# Patient Record
Sex: FEMALE
[De-identification: names, ages, dates, MRNs, and addresses within clinical notes are randomized per-mention and may not be internally consistent; named-entity substitution may affect disease eponyms.]

---

## 2022-02-22 PROBLEM — Z00.00 ENCOUNTER FOR PREVENTIVE HEALTH EXAMINATION: Status: ACTIVE | Noted: 2022-02-22

## 2022-02-23 ENCOUNTER — APPOINTMENT (OUTPATIENT)
Dept: GERIATRICS | Facility: ASSISTED LIVING FACILITY | Age: 87
End: 2022-02-23
Payer: MEDICARE

## 2022-02-23 VITALS
DIASTOLIC BLOOD PRESSURE: 85 MMHG | OXYGEN SATURATION: 98 % | RESPIRATION RATE: 12 BRPM | SYSTOLIC BLOOD PRESSURE: 154 MMHG | TEMPERATURE: 97.8 F | HEART RATE: 68 BPM

## 2022-02-23 VITALS — WEIGHT: 119.8 LBS

## 2022-02-23 DIAGNOSIS — R91.1 SOLITARY PULMONARY NODULE: ICD-10-CM
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
## 2022-02-23 NOTE — ASSESSMENT
[FreeTextEntry1] : Plan of care d/w patient's daughter, Sridevi. We need to clarify who will be family spokesperson given three adult children and spouse who is reportedly hospitalized.\par \par We spent extended time during this visit with review of >50 pages of medical records, obtaining information from family and care team, and counseling. \par \par f/u visit in 4 weeks.

## 2022-02-23 NOTE — PHYSICAL EXAM
[Alert] : alert [Well Nourished] : well nourished [No Acute Distress] : in no acute distress [Sclera] : the sclera and conjunctiva were normal [EOMI] : extraocular movements were intact [PERRL] : pupils were equal in size, round, and reactive to light [Normal Outer Ear/Nose] : the ears and nose were normal in appearance [Normal Appearance] : the appearance of the neck was normal [Supple] : the neck was supple [No Respiratory Distress] : no respiratory distress [No Acc Muscle Use] : no accessory muscle use [Respiration, Rhythm And Depth] : normal respiratory rhythm and effort [Auscultation Breath Sounds / Voice Sounds] : lungs were clear to auscultation bilaterally [Heart Rate And Rhythm] : heart rate was normal and rhythm regular [Bowel Sounds] : normal bowel sounds [Abdomen Tenderness] : non-tender [Abdomen Soft] : soft [No Spinal Tenderness] : no spinal tenderness [Normal Color / Pigmentation] : normal skin color and pigmentation [Normal Turgor] : normal skin turgor [No Focal Deficits] : no focal deficits [Normal Affect] : the affect was normal [Normal Mood] : the mood was normal

## 2022-02-23 NOTE — DATA REVIEWED
[FreeTextEntry1] : Labs from Hospitalization on 2/9/22 reviewed:\par \par CBC 5.6 > 14/42 < 288 (overall unremarkable)\par /3.4; 106/26; 11/0.94 < 117/9.1\par LFTs 7.6/3.7; 19/20; /69\par \par CT Head (2/2022): Chronic microvascular disease, no acute changes or masses

## 2022-03-01 ENCOUNTER — TRANSCRIPTION ENCOUNTER (OUTPATIENT)
Age: 87
End: 2022-03-01

## 2022-03-23 ENCOUNTER — APPOINTMENT (OUTPATIENT)
Dept: GERIATRICS | Facility: ASSISTED LIVING FACILITY | Age: 87
End: 2022-03-23
Payer: MEDICARE

## 2022-03-23 VITALS
DIASTOLIC BLOOD PRESSURE: 84 MMHG | OXYGEN SATURATION: 95 % | WEIGHT: 124 LBS | RESPIRATION RATE: 12 BRPM | HEART RATE: 90 BPM | SYSTOLIC BLOOD PRESSURE: 150 MMHG

## 2022-03-23 DIAGNOSIS — R03.0 ELEVATED BLOOD-PRESSURE READING, W/OUT DIAGNOSIS OF HYPERTENSION: ICD-10-CM

## 2022-03-23 DIAGNOSIS — Z82.0 FAMILY HISTORY OF EPILEPSY AND OTHER DISEASES OF THE NERVOUS SYSTEM: ICD-10-CM

## 2022-03-23 DIAGNOSIS — Z82.49 FAMILY HISTORY OF ISCHEMIC HEART DISEASE AND OTHER DISEASES OF THE CIRCULATORY SYSTEM: ICD-10-CM

## 2022-03-25 PROBLEM — Z82.0 FAMILY HISTORY OF PARKINSONISM: Status: ACTIVE | Noted: 2022-03-25

## 2022-03-25 PROBLEM — Z82.49 FAMILY HISTORY OF CEREBRAL ANEURYSM: Status: ACTIVE | Noted: 2022-02-23

## 2022-03-25 PROBLEM — R03.0 ELEVATED BLOOD PRESSURE READING: Status: ACTIVE | Noted: 2022-02-23

## 2022-03-25 NOTE — HISTORY OF PRESENT ILLNESS
[FreeTextEntry1] : 86 year old woman w/ PMH moderate Alzheimer's dementia w/ behavioral disturbance, anxiety, GERD, HLD, osteopenia presents for f/u visit.\par \par Patient is accompanied by daughter, Sridevi, who provides collateral hx. Also hx from wellness RN, Romy. \par \par Clarification to initial note: patient had only one hospitalization [facility].\par \par Patient has been doing well the past few weeks. Reportedly sleeping w/o disturbance. She has moments of hypersexuality, but is easily redirectable. There have been minimal moments of agitation. She is calmer than when she first moved into prison. \par \par Daughter reports that patient states she has occasional pain in her R heel and ankle when ambulating. Currently no reported pain. It is intermittent and resolves w/o any intervention. No reported pain at rest, not reproducible. No gait abnormality.\par \par #Alzheimers' disease: Daughter reports AD was first dx in 2019. She has had steady decline, now with total dependence on IADLs. Remains independent in ADLs. She has been on aricept for >1 year. She is now on seroquel 50 mg qhs. Has been sleeping well w/ no overnight agitation. No reported adverse SE. \par \par #HLD: On simvastatin 20 mg qhs. No FLP for review\par \par #GERD: On PPI. Reports no issues at this time\par \par #Osteopenia: On problem list from hospital records, but we have no DEXA available for review.\par \par #Lung Nodule: In records from CT chest ~2013, 4mm of LLL. Unclear if this was ever followed up or further investigated. Reportedly never a smoker. \par \par #Liver Cyst: In records from CT ~2013, noted 2.2x1.7 cm hypodense lesion in the L liver lobe, ?cyst vs. hemangioma\par \par SH: \par - , lived in [facility] prior to moving to [place] [facility]\par - Three children\par - Retired \par - Uses alcohol, unclear amount or severity or if h/o alcohol use disorder [TextBox_25] : unclear, reportedly h/o osteopenia [TextBox_31] : no screening on file, daughter states she is "hard of hearing" [Mammogramdate] : 08/14 [TextBox_19] : no need for further screening [AdvancecareDate] : 03/22 [FreeTextEntry4] : Daughters: Millie ([phone]), Sridevi ([phone])\par Spouse: Gerson Norman ([phone])\par \par Patient does not have designated HCP and currently does not have capacity to appoint one due to her underlying cognitive impairment. She is , but it is unclear if spouse is currently well enough to participate in decision making. Reportedly he has advanced illness.\par By surrogacy law, this would mean each of her 3 daughters share responsibility in decision making. \par However, Sridevi states today there may be HCP available for review.

## 2022-04-20 ENCOUNTER — APPOINTMENT (OUTPATIENT)
Dept: GERIATRICS | Facility: ASSISTED LIVING FACILITY | Age: 87
End: 2022-04-20
Payer: MEDICARE

## 2022-04-20 VITALS
OXYGEN SATURATION: 97 % | RESPIRATION RATE: 14 BRPM | WEIGHT: 129 LBS | DIASTOLIC BLOOD PRESSURE: 80 MMHG | HEART RATE: 78 BPM | SYSTOLIC BLOOD PRESSURE: 140 MMHG

## 2022-04-20 DIAGNOSIS — J30.9 ALLERGIC RHINITIS, UNSPECIFIED: ICD-10-CM

## 2022-04-20 RX ORDER — LORATADINE 10 MG/1
10 TABLET ORAL
Qty: 90 | Refills: 0 | Status: DISCONTINUED | COMMUNITY
Start: 2022-02-24 | End: 2022-04-20

## 2022-04-20 NOTE — DATA REVIEWED
[FreeTextEntry1] : Labs from Hospitalization on 2/9/22 reviewed:\par \par CBC 5.6 > 14/42 < 288 (overall unremarkable)\par /3.4; 106/26; 11/0.94 < 117/9.1\par LFTs 7.6/3.7; 19/20; /69\par B12 271\par RPR neg\par \par CT Head (2/2022): Chronic microvascular disease, no acute changes or masses

## 2022-04-20 NOTE — REASON FOR VISIT
[Follow-Up] : a follow-up visit [Formal Caregiver] : formal caregiver [Family Member] : family member [FreeTextEntry3] : daughter Sridevi and RUBEN Reyes

## 2022-04-20 NOTE — HISTORY OF PRESENT ILLNESS
[Patient reported osteoporosis screening was abnormal] : Patient reported osteoporosis screening was abnormal [Patient reported hearing was abnormal] : Patient reported hearing was abnormal [Patient reported colon/rectal/cancer screening was normal] : Patient reported colon/rectal cancer screening was normal [No falls in past year] : Patient reported no falls in the past year [Completely Independent] : Completely independent. [Completely Dependent] : Completely dependent. [With Patient/Caregiver] : , with patient/caregiver [Reviewed updated] : Reviewed, updated [FreeTextEntry1] : 86 year old woman w/ PMH moderate Alzheimer's dementia w/ behavioral disturbance, anxiety, GERD, HLD, osteopenia presents for f/u visit.\par \par Patient is accompanied by daughter, Sridevi, who provides collateral hx. Also hx from wellness RN, Romy. \par \par Prior Neurologist: Dr. Chloe Lara ([phone])\par \par Daughter, Sridevi, reports that patient's "memory is better" but she has had some episodes of "agitation" and "anxiety." Patient was delirious during hospitalization 2/2022 and continued upon move-in. She has at least moderate neurodegenerative disease at baseline. In the past two weeks, she has had a few episodes where she became angry with staff, but quickly was redirectable. No violent episodes.\par \par Her daughters continue to visit her daily, but have spent less time at half[place], allowing her to adapt to new environment. Sleeping has generally remained ok.\par \par R heel pain that was reported during visit one month ago reportedly resolved. Patient has not reported any issues in the interim. \par \par #Alzheimers' disease: Daughter reports AD was first dx in 2019. She has had steady decline, now with total dependence on IADLs. Remains independent in ADLs. She has been on aricept for >1 year. She is on seroquel 50 mg qhs, which has helped with sleep and evening agitation. No reported adverse SE. She does have behavioral issues that seem to be more frontal lobe related. \par \par #HLD: On simvastatin 20 mg qhs. No FLP for review. Did have evidence of microvascular disease on CT head, also on ASA 81 mg qd.\par \par #GERD: On PPI. Reports no issues at this time.\par \par #Osteopenia: On problem list from hospital records, but we have no DEXA available for review.\par \par #Lung Nodule: In records from CT chest ~2013, 4mm of LLL. Unclear if this was ever followed up or further investigated. Reportedly never a smoker. \par \par #Liver Cyst: In records from CT ~2013, noted 2.2x1.7 cm hypodense lesion in the L liver lobe, ?cyst vs. hemangioma\par \par SH: \par - , lived in [facility] prior to moving to [place] half[place]way\par - Three children (daughters)\par - Retired  [TextBox_25] : unclear, reportedly h/o osteopenia [TextBox_31] : no screening on file, daughter states she is "hard of hearing" [Mammogramdate] : 08/14 [TextBox_19] : no need for further screening [AdvancecareDate] : 03/22 [FreeTextEntry4] : Daughters: Millie ([phone]), Sridevi ([phone]), and Cady\par Spouse: Gerson Norman ([phone]) - has deferred decision making\par \par Patient's three daughters make decisions concurrently. Sridevi is preferred family point of contact.\par \par Patient does not have designated HCP and currently does not have capacity to appoint one due to her underlying cognitive impairment. She is , but it is unclear if spouse is currently well enough to participate in decision making. Reportedly he has advanced illness.\par By surrogacy law, this would mean each of her 3 daughters share responsibility in decision making. \par However, Sridevi states today there may be HCP available for review.

## 2022-04-20 NOTE — PHYSICAL EXAM
[Alert] : alert [Well Nourished] : well nourished [Sclera] : the sclera and conjunctiva were normal [EOMI] : extraocular movements were intact [PERRL] : pupils were equal in size, round, and reactive to light [Normal Outer Ear/Nose] : the ears and nose were normal in appearance [Normal Appearance] : the appearance of the neck was normal [Supple] : the neck was supple [No Respiratory Distress] : no respiratory distress [No Acc Muscle Use] : no accessory muscle use [Respiration, Rhythm And Depth] : normal respiratory rhythm and effort [Auscultation Breath Sounds / Voice Sounds] : lungs were clear to auscultation bilaterally [Heart Rate And Rhythm] : heart rate was normal and rhythm regular [Bowel Sounds] : normal bowel sounds [Abdomen Tenderness] : non-tender [Abdomen Soft] : soft [No Spinal Tenderness] : no spinal tenderness [Normal Gait] : normal gait [No Joint Swelling] : no joint swelling seen [Normal Color / Pigmentation] : normal skin color and pigmentation [Normal Turgor] : normal skin turgor [No Focal Deficits] : no focal deficits [Normal S1, S2] : normal S1 and S2 [Edema] : edema was not present [Normal Insight/Judgment] : insight and judgment were not intact [de-identified] : flight of ideas, scattered affect. initially became agitated when we entered her room to examine her; she quickly apologized and stated "I'm joking, please please come in." inappropriate questions, consistently inquired about staff members marital status

## 2022-04-27 ENCOUNTER — APPOINTMENT (OUTPATIENT)
Dept: GERIATRICS | Facility: ASSISTED LIVING FACILITY | Age: 87
End: 2022-04-27
Payer: MEDICARE

## 2022-04-29 VITALS
RESPIRATION RATE: 12 BRPM | SYSTOLIC BLOOD PRESSURE: 140 MMHG | HEART RATE: 75 BPM | DIASTOLIC BLOOD PRESSURE: 68 MMHG | OXYGEN SATURATION: 98 %
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
## 2022-04-29 NOTE — PHYSICAL EXAM
[Alert] : alert [Well Nourished] : well nourished [Sclera] : the sclera and conjunctiva were normal [EOMI] : extraocular movements were intact [PERRL] : pupils were equal in size, round, and reactive to light [Normal Outer Ear/Nose] : the ears and nose were normal in appearance [Normal Appearance] : the appearance of the neck was normal [Supple] : the neck was supple [No Respiratory Distress] : no respiratory distress [No Acc Muscle Use] : no accessory muscle use [Respiration, Rhythm And Depth] : normal respiratory rhythm and effort [Auscultation Breath Sounds / Voice Sounds] : lungs were clear to auscultation bilaterally [Normal S1, S2] : normal S1 and S2 [Heart Rate And Rhythm] : heart rate was normal and rhythm regular [Edema] : edema was not present [Bowel Sounds] : normal bowel sounds [Abdomen Tenderness] : non-tender [Abdomen Soft] : soft [No Spinal Tenderness] : no spinal tenderness [Normal Gait] : normal gait [No Joint Swelling] : no joint swelling seen [Normal Color / Pigmentation] : normal skin color and pigmentation [Normal Turgor] : normal skin turgor [No Focal Deficits] : no focal deficits [Normal Insight/Judgment] : insight and judgment were not intact [de-identified] : There is no evidence of ankle trauma. no reproducible pain. full ROM at b/l joints [de-identified] : flight of ideas, scattered affect. initially became agitated when we entered her room to examine her; she quickly apologized and stated "I'm joking, please please come in." inappropriate questions, consistently inquired about staff members marital status

## 2022-04-29 NOTE — REASON FOR VISIT
[Acute] : an acute visit [Formal Caregiver] : formal caregiver [Family Member] : family member [FreeTextEntry3] : daughter, Sridevi

## 2022-05-03 LAB
ALBUMIN SERPL ELPH-MCNC: 4.3 G/DL
ALP BLD-CCNC: 63 U/L
ALT SERPL-CCNC: 14 U/L
ANION GAP SERPL CALC-SCNC: 13 MMOL/L
AST SERPL-CCNC: 24 U/L
BASOPHILS # BLD AUTO: 0.05 K/UL
BASOPHILS NFR BLD AUTO: 0.6 %
BILIRUB SERPL-MCNC: 0.2 MG/DL
BUN SERPL-MCNC: 14 MG/DL
CALCIUM SERPL-MCNC: 9.3 MG/DL
CHLORIDE SERPL-SCNC: 105 MMOL/L
CHOLEST SERPL-MCNC: 219 MG/DL
CO2 SERPL-SCNC: 25 MMOL/L
CREAT SERPL-MCNC: 0.97 MG/DL
EGFR: 57 ML/MIN/1.73M2
EOSINOPHIL # BLD AUTO: 0.13 K/UL
EOSINOPHIL NFR BLD AUTO: 1.7 %
GLUCOSE SERPL-MCNC: 96 MG/DL
HCT VFR BLD CALC: 44.3 %
HDLC SERPL-MCNC: 77 MG/DL
HGB BLD-MCNC: 14.1 G/DL
IMM GRANULOCYTES NFR BLD AUTO: 0.3 %
LDLC SERPL CALC-MCNC: 107 MG/DL
LYMPHOCYTES # BLD AUTO: 1.87 K/UL
LYMPHOCYTES NFR BLD AUTO: 24.2 %
MAN DIFF?: NORMAL
MCHC RBC-ENTMCNC: 31.1 PG
MCHC RBC-ENTMCNC: 31.8 GM/DL
MCV RBC AUTO: 97.6 FL
MONOCYTES # BLD AUTO: 0.55 K/UL
MONOCYTES NFR BLD AUTO: 7.1 %
NEUTROPHILS # BLD AUTO: 5.12 K/UL
NEUTROPHILS NFR BLD AUTO: 66.1 %
NONHDLC SERPL-MCNC: 142 MG/DL
PLATELET # BLD AUTO: 267 K/UL
POTASSIUM SERPL-SCNC: 3.9 MMOL/L
PROT SERPL-MCNC: 6.8 G/DL
RBC # BLD: 4.54 M/UL
RBC # FLD: 12.6 %
SODIUM SERPL-SCNC: 143 MMOL/L
TRIGL SERPL-MCNC: 177 MG/DL
TSH SERPL-ACNC: 0.46 UIU/ML
VIT B12 SERPL-MCNC: 547 PG/ML
WBC # FLD AUTO: 7.74 K/UL

## 2022-05-18 ENCOUNTER — APPOINTMENT (OUTPATIENT)
Dept: GERIATRICS | Facility: ASSISTED LIVING FACILITY | Age: 87
End: 2022-05-18
Payer: MEDICARE

## 2022-05-18 VITALS
RESPIRATION RATE: 18 BRPM | DIASTOLIC BLOOD PRESSURE: 80 MMHG | HEART RATE: 65 BPM | SYSTOLIC BLOOD PRESSURE: 147 MMHG | OXYGEN SATURATION: 97 %

## 2022-05-18 RX ORDER — SIMVASTATIN 20 MG/1
20 TABLET, FILM COATED ORAL
Qty: 90 | Refills: 1 | Status: DISCONTINUED | COMMUNITY
Start: 2022-02-24 | End: 2022-05-18

## 2022-05-18 NOTE — REASON FOR VISIT
[Follow-Up] : a follow-up visit [Formal Caregiver] : formal caregiver [Family Member] : family member [FreeTextEntry3] : Daughter, Sridevi & Wellness RN, Romy

## 2022-05-18 NOTE — END OF VISIT
[Time Spent: ___ minutes] : I have spent [unfilled] minutes of time on the encounter. [FreeTextEntry3] : Present for duration of visit w/ NP Braydon. Agree with assessment w/ minor revision.\par \par Patient w/ likely OA accounting for intermittent b/l ankle pain. There is no clinical evidence of inflammatory or infectious arthritis. Discussed feasibility of ortho eval w/ daughter, Sridevi, which she would like to pursue. Referral placed.\par \par Patient reportedly has had intermittent HA a/w sinus pressure and postnasal drip. No HA reported during our visit today. She has no focal deficits, no change in vision. She had CT head completed 2/2022 that showed microvascular dx w/o any space occupying lesions. We will treat w/ fluticasone for suspected chronic allergic rhinitis. I have discussed feasibility of neuro eval +/- further imaging w MRI brain which we are in agreement to defer for now.\par \par Her BP has been consistently >140/80 w/ most readings >150 SBP. Reasonable to initiate antihypertensive medication trial w/ goal SBP < 140. Will start norvasc 5 mg qd. Change statin to lipitor to avoid med-med interaction w/ simvastatin. \par \par f/u visit in 4 weeks.

## 2022-05-18 NOTE — HISTORY OF PRESENT ILLNESS
[Patient reported osteoporosis screening was abnormal] : Patient reported osteoporosis screening was abnormal [Patient reported hearing was abnormal] : Patient reported hearing was abnormal [Patient reported colon/rectal/cancer screening was normal] : Patient reported colon/rectal cancer screening was normal [No falls in past year] : Patient reported no falls in the past year [Completely Independent] : Completely independent. [Completely Dependent] : Completely dependent. [With Patient/Caregiver] : , with patient/caregiver [Reviewed updated] : Reviewed, updated [FreeTextEntry1] : JORGE BAUTISTA is an 86 year old woman w/ PMH moderate Alzheimer's dementia w/ behavioral disturbance, anxiety, GERD, HLD, and osteopenia who presents today for follow up visit.  Patient is accompanied by daughterSridevi who provides collateral history. Wellness, RUBEN Stanton also present and provided additional history.\par \par Prior Neurologist: Dr. Chloe Lara ([phone])\par \par Anxiety:\par -DaughterSridevi reports that anxiety has improved but is still present especially when she is not visiting at [place].\par -Lexapro 5 mg started 1 month ago \par -daughter still visiting [facility] frequently but less often than before, allowing patient to continue to adapt to new location\par \par Cough:\par -daughterSridevi, states that patient has had cough a/w postnasal drip intermittently\par -cough is not productive\par -states that she has seasonal allergies\par -cough is a/w occasional frontal HA in distribution of frontal sinuses (no alarm sx)\par \par Alzheimers' disease:\par - Daughter reports AD was first dx in 2019. She has had steady decline, now with total dependence on IADLs. \par -Remains independent in ADLs.\par -She has been on aricept for >1 year\par -She is on seroquel 50 mg qhs, which has helped with sleep and evening agitation. No reported adverse SE. \par - She does have behavioral issues that seem to be more frontal lobe related. \par -no longer having violent episodes\par \par Right heel pain:\par -patient no longer complaining of this however, daughterSridevi would like orthopedic referral  for possible further evalutaion\par \par Vision loss:\par -Sridevi states that patient occasionally c/o difficulty seeing; currently wears glasses\par -would like ophthalmology referral \par \par Insomnia:\par -reportedly improved\par -patient taking melatonin 3 mg \par \par Skin lesion on forehead:\par -patient saw dermatologist recently who removed skin lesion \par \par _____________________________________________________________________________________________________________\par #HLD: On simvastatin 20 mg qhs.  Did have evidence of microvascular disease on CT head, also on ASA 81 mg qd.\par \par #GERD: On PPI. Reports no issues at this time.\par \par #Osteopenia: On problem list from hospital records, but we have no DEXA available for review.\par \par #Lung Nodule: In records from CT chest ~2013, 4mm of LLL. Unclear if this was ever followed up or further investigated. Reportedly never a smoker. \par \par #Liver Cyst: In records from CT ~2013, noted 2.2x1.7 cm hypodense lesion in the L liver lobe, ?cyst vs. hemangioma\par \par SH: \par - , lived in [facility] prior to moving to [place] [facility]\par - Three children (daughters)\par - Retired  [TextBox_25] : unclear, reportedly h/o osteopenia [TextBox_31] : no screening on file, daughter states she is "hard of hearing" [Mammogramdate] : 08/14 [TextBox_19] : no need for further screening [AdvancecareDate] : 05/22 [FreeTextEntry4] : Daughters: Millie ([phone]), Sridevi ([phone]), and Cady\par Spouse: Gerson Norman ([phone]) - has deferred decision making\par Patient's three daughters make decisions concurrently. Sridevi is preferred family point of contact.\par \par Patient does not have designated HCP and currently does not have capacity to appoint one due to her underlying cognitive impairment. She is , but it is unclear if spouse is currently well enough to participate in decision making. Reportedly he has advanced illness.\par By surrogacy law, this would mean each of her 3 daughters share responsibility in decision making. \par However, Sridevi states today there may be HCP available for review.

## 2022-05-18 NOTE — ASSESSMENT
[FreeTextEntry1] : next visit in 1 month, earlier if needed\par \par Direct patient time 9:30am-10:10am \par \par ANA Griffiths-BC

## 2022-05-18 NOTE — PHYSICAL EXAM
[Alert] : alert [Well Nourished] : well nourished [Sclera] : the sclera and conjunctiva were normal [EOMI] : extraocular movements were intact [PERRL] : pupils were equal in size, round, and reactive to light [Normal Outer Ear/Nose] : the ears and nose were normal in appearance [Normal Appearance] : the appearance of the neck was normal [Supple] : the neck was supple [No Respiratory Distress] : no respiratory distress [No Acc Muscle Use] : no accessory muscle use [Respiration, Rhythm And Depth] : normal respiratory rhythm and effort [Auscultation Breath Sounds / Voice Sounds] : lungs were clear to auscultation bilaterally [Normal S1, S2] : normal S1 and S2 [Heart Rate And Rhythm] : heart rate was normal and rhythm regular [Edema] : edema was not present [Bowel Sounds] : normal bowel sounds [Abdomen Tenderness] : non-tender [Abdomen Soft] : soft [No Spinal Tenderness] : no spinal tenderness [Normal Gait] : normal gait [No Joint Swelling] : no joint swelling seen [Normal Color / Pigmentation] : normal skin color and pigmentation [Normal Turgor] : normal skin turgor [No Focal Deficits] : no focal deficits [Pedal Pulses Normal] : the pedal pulses are present [Normal Insight/Judgment] : insight and judgment were not intact [de-identified] : healing skin lesion on forehead after removal at dermatologist [de-identified] : flight of ideas, scattered affect, quickly becomes agitated but is able to be redirected, non violent, often seems angry but follt follows by stating "I'm just teasing"

## 2022-06-13 ENCOUNTER — APPOINTMENT (OUTPATIENT)
Dept: GERIATRICS | Facility: CLINIC | Age: 87
End: 2022-06-13

## 2022-06-15 ENCOUNTER — APPOINTMENT (OUTPATIENT)
Dept: GERIATRICS | Facility: ASSISTED LIVING FACILITY | Age: 87
End: 2022-06-15
Payer: MEDICARE

## 2022-06-15 VITALS
WEIGHT: 130 LBS | HEART RATE: 74 BPM | SYSTOLIC BLOOD PRESSURE: 127 MMHG | RESPIRATION RATE: 12 BRPM | DIASTOLIC BLOOD PRESSURE: 60 MMHG | OXYGEN SATURATION: 97 %

## 2022-06-15 DIAGNOSIS — F41.9 ANXIETY DISORDER, UNSPECIFIED: ICD-10-CM

## 2022-06-15 DIAGNOSIS — G47.00 INSOMNIA, UNSPECIFIED: ICD-10-CM

## 2022-06-15 RX ORDER — OMEPRAZOLE 20 MG/1
20 CAPSULE, DELAYED RELEASE ORAL
Qty: 90 | Refills: 0 | Status: DISCONTINUED | COMMUNITY
Start: 2022-02-24 | End: 2022-06-15

## 2022-06-15 NOTE — SOCIAL HISTORY
Comprehensive Intake Entered On:  7/17/2019 1:33 PM CDT    Performed On:  7/17/2019 1:29 PM CDT by Scarlett Isaac               Summary   Chief Complaint :   Nursing home rounds   Weight Measured :   166.0 lb(Converted to: 166 lb 0 oz, 75.30 kg)    Systolic Blood Pressure :   119 mmHg   Diastolic Blood Pressure :   80 mmHg   Mean Arterial Pressure :   93 mmHg   Peripheral Pulse Rate :   106 bpm (HI)    Vital Signs Comments :   weight per kinni   BP Method :   Electronic   HR Method :   Electronic   Temperature Tympanic :   97.6 DegF(Converted to: 36.4 DegC)  (LOW)    Scarlett Isaac - 7/17/2019 1:29 PM CDT   Health Status   Allergies Verified? :   Yes   Medication History Verified? :   Yes   Pre-Visit Planning Status :   Completed   Tobacco Use? :   Never smoker   Scarlett Isaac - 7/17/2019 1:29 PM CDT  
[Assisted living] : in assisted living

## 2022-06-16 NOTE — END OF VISIT
[Time Spent: ___ minutes] : I have spent [unfilled] minutes of time on the encounter. [FreeTextEntry3] : I was present for duration of history, physical, and patient counseling for patient's visit. I agree with assessment as documented above with the following additions/revisions: \par \par - Spoke at length with daughter, Sridevi, regarding management and behaviors\par - I suspect some of increased agitation last week was due to missed medication doses x 4 nights\par - She has taken seroquel for the past 2 days and Sridevi notes she was "closer to herself" this AM\par - She remains suspicious with paranoid delusions but was redirectable during my visit with her today\par - She does not have capacity to refuse medications. I have counseled with wellness staff here and advised that patient should have medications crushed and given in food or drink if needed. Daughter/HCP Sridevi is in agreement\par - Patient's BP is better controlled since starting norvasc\par - If behaviors worsen where she is a danger to staff or other residents, she would need ER evaluation. Controlled today\par - We will d/c omeprazole. Daughter is unaware when/why PPI was started in the past\par \par Short term f/u visit in 1 week

## 2022-06-16 NOTE — PHYSICAL EXAM
[Alert] : alert [Well Nourished] : well nourished [Sclera] : the sclera and conjunctiva were normal [EOMI] : extraocular movements were intact [PERRL] : pupils were equal in size, round, and reactive to light [Normal Outer Ear/Nose] : the ears and nose were normal in appearance [Normal Appearance] : the appearance of the neck was normal [Supple] : the neck was supple [No Respiratory Distress] : no respiratory distress [No Acc Muscle Use] : no accessory muscle use [Respiration, Rhythm And Depth] : normal respiratory rhythm and effort [Auscultation Breath Sounds / Voice Sounds] : lungs were clear to auscultation bilaterally [Normal S1, S2] : normal S1 and S2 [Heart Rate And Rhythm] : heart rate was normal and rhythm regular [Edema] : edema was not present [Pedal Pulses Normal] : the pedal pulses are present [Bowel Sounds] : normal bowel sounds [Abdomen Tenderness] : non-tender [Abdomen Soft] : soft [No Spinal Tenderness] : no spinal tenderness [Normal Gait] : normal gait [No Joint Swelling] : no joint swelling seen [Normal Color / Pigmentation] : normal skin color and pigmentation [Normal Turgor] : normal skin turgor [No Focal Deficits] : no focal deficits [Cervical Lymph Nodes Enlarged Posterior Bilaterally] : posterior cervical [Supraclavicular Lymph Nodes Enlarged Bilaterally] : supraclavicular [Cervical Lymph Nodes Enlarged Anterior Bilaterally] : anterior cervical, supraclavicular [Axillary Lymph Nodes Enlarged Bilaterally] : axillary [No CVA Tenderness] : no CVA  tenderness [Normal Insight/Judgment] : insight and judgment were not intact [de-identified] : flight of ideas, scattered affect, quickly becomes agitated but is able to be redirected, often seems angry but follows by stating "I'm just teasing"
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
## 2022-06-29 ENCOUNTER — APPOINTMENT (OUTPATIENT)
Dept: GERIATRICS | Facility: ASSISTED LIVING FACILITY | Age: 87
End: 2022-06-29

## 2022-07-13 ENCOUNTER — APPOINTMENT (OUTPATIENT)
Dept: GERIATRICS | Facility: ASSISTED LIVING FACILITY | Age: 87
End: 2022-07-13

## 2022-07-13 DIAGNOSIS — M25.579 PAIN IN UNSPECIFIED ANKLE AND JOINTS OF UNSPECIFIED FOOT: ICD-10-CM

## 2022-07-13 DIAGNOSIS — Z76.89 PERSONS ENCOUNTERING HEALTH SERVICES IN OTHER SPECIFIED CIRCUMSTANCES: ICD-10-CM

## 2022-07-13 DIAGNOSIS — E53.8 DEFICIENCY OF OTHER SPECIFIED B GROUP VITAMINS: ICD-10-CM

## 2022-07-13 DIAGNOSIS — K21.9 GASTRO-ESOPHAGEAL REFLUX DISEASE W/OUT ESOPHAGITIS: ICD-10-CM

## 2022-07-13 DIAGNOSIS — E78.5 HYPERLIPIDEMIA, UNSPECIFIED: ICD-10-CM

## 2022-07-13 PROCEDURE — 99496 TRANSJ CARE MGMT HIGH F2F 7D: CPT

## 2022-07-13 RX ORDER — FLUTICASONE PROPIONATE 50 UG/1
50 SPRAY, METERED NASAL DAILY
Qty: 1 | Refills: 1 | Status: DISCONTINUED | COMMUNITY
Start: 2022-05-18 | End: 2022-07-13

## 2022-07-13 RX ORDER — QUETIAPINE FUMARATE 50 MG/1
50 TABLET ORAL
Qty: 90 | Refills: 1 | Status: DISCONTINUED | COMMUNITY
Start: 2022-02-24 | End: 2022-07-13

## 2022-07-13 RX ORDER — ATORVASTATIN CALCIUM 20 MG/1
20 TABLET, FILM COATED ORAL
Qty: 90 | Refills: 1 | Status: DISCONTINUED | COMMUNITY
Start: 2022-05-18 | End: 2022-07-13

## 2022-07-13 RX ORDER — DONEPEZIL HYDROCHLORIDE 10 MG/1
10 TABLET ORAL
Qty: 90 | Refills: 1 | Status: DISCONTINUED | COMMUNITY
Start: 2022-02-24 | End: 2022-07-13

## 2022-07-13 RX ORDER — ASPIRIN ENTERIC COATED TABLETS 81 MG 81 MG/1
81 TABLET, DELAYED RELEASE ORAL DAILY
Qty: 90 | Refills: 2 | Status: DISCONTINUED | COMMUNITY
Start: 2022-02-24 | End: 2022-07-13

## 2022-07-13 RX ORDER — CYANOCOBALAMIN (VITAMIN B-12) 100 MCG
100 TABLET ORAL DAILY
Qty: 90 | Refills: 0 | Status: DISCONTINUED | COMMUNITY
Start: 2022-04-20 | End: 2022-07-13

## 2022-07-14 VITALS
DIASTOLIC BLOOD PRESSURE: 64 MMHG | RESPIRATION RATE: 14 BRPM | HEART RATE: 63 BPM | SYSTOLIC BLOOD PRESSURE: 110 MMHG | OXYGEN SATURATION: 98 % | WEIGHT: 131 LBS

## 2022-07-14 PROBLEM — M25.579 ANKLE PAIN: Status: ACTIVE | Noted: 2022-04-27

## 2022-07-14 PROBLEM — Z76.89 ENCOUNTER FOR SUPPORT AND COORDINATION OF TRANSITION OF CARE: Status: ACTIVE | Noted: 2022-07-14

## 2022-07-14 PROBLEM — E53.8 LOW VITAMIN B12 LEVEL: Status: ACTIVE | Noted: 2022-04-20

## 2022-07-14 PROBLEM — K21.9 CHRONIC GERD: Status: ACTIVE | Noted: 2022-02-24

## 2022-07-14 PROBLEM — E78.5 HLD (HYPERLIPIDEMIA): Status: ACTIVE | Noted: 2022-02-23

## 2022-07-14 NOTE — REASON FOR VISIT
[Post Hospitalization] : a post hospitalization visit [Formal Caregiver] : formal caregiver [Family Member] : family member [FreeTextEntry1] : hospitalized at [place] late June to July 12th

## 2022-07-14 NOTE — PHYSICAL EXAM
[Alert] : alert [PERRL] : pupils were equal in size, round, and reactive to light [Normal Outer Ear/Nose] : the ears and nose were normal in appearance [Normal Appearance] : the appearance of the neck was normal [Supple] : the neck was supple [No Respiratory Distress] : no respiratory distress [No Acc Muscle Use] : no accessory muscle use [Respiration, Rhythm And Depth] : normal respiratory rhythm and effort [Auscultation Breath Sounds / Voice Sounds] : lungs were clear to auscultation bilaterally [Murmurs] : no murmurs [Heart Rate And Rhythm] : heart rate was normal and rhythm regular [Edema] : edema was not present [Pedal Pulses Normal] : the pedal pulses are present [Bowel Sounds] : normal bowel sounds [Abdomen Tenderness] : non-tender [Abdomen Soft] : soft [No Spinal Tenderness] : no spinal tenderness [Normal Turgor] : normal skin turgor [Normal Gait] : abnormal gait [de-identified] : notably anxious, initially hesitant to allow for evaluation [de-identified] : healing 3 cm bruise upper R arm. no sacral wound [de-identified] : anxious. flight of ideas
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
## 2022-07-14 NOTE — DATA REVIEWED
[FreeTextEntry1] : She had CT head that showed "generalized parenchymal volume loss and chronic microvascular disease, no acute findings." Reportedly had negative infectious workup and COVID negative x 2. Last labs on document that was sent noted WBC 6.77, Hgb 14.4, Na 140, and Cr 0.83 w/o other information.

## 2022-07-20 ENCOUNTER — APPOINTMENT (OUTPATIENT)
Dept: GERIATRICS | Facility: ASSISTED LIVING FACILITY | Age: 87
End: 2022-07-20

## 2022-07-20 VITALS — RESPIRATION RATE: 16 BRPM | OXYGEN SATURATION: 97 % | HEART RATE: 74 BPM

## 2022-07-20 RX ORDER — DIVALPROEX SODIUM 125 MG/1
125 CAPSULE, COATED PELLETS ORAL
Qty: 120 | Refills: 0 | Status: ACTIVE | COMMUNITY
Start: 2022-07-20 | End: 1900-01-01

## 2022-07-20 NOTE — PHYSICAL EXAM
[Alert] : alert [Normal Oral Mucosa] : normal oral mucosa [No Respiratory Distress] : no respiratory distress [Abdomen Tenderness] : non-tender [Normal Gait] : normal gait [Normal Color / Pigmentation] : normal skin color and pigmentation [de-identified] : pacing, eventually redirectable to sit in chair. not answering questions appropriately [de-identified] : pressured speech, transitions from agitation to cooperativeness withing seconds, mood labile

## 2022-07-20 NOTE — CURRENT MEDS
[] : missed dose(s) of medications. Reason(s): [Yes] : Reviewed medication list for presence of high-risk medications. [de-identified] : refuses medication

## 2022-07-20 NOTE — ASSESSMENT
[FreeTextEntry1] : Plan of care d/w patient's daughter/HCP, who stated she has tried to communicate with her siblings. \par \par Short term f/u visit planned\par

## 2022-07-20 NOTE — REASON FOR VISIT
[Follow-Up] : a follow-up visit [Formal Caregiver] : formal caregiver [Family Member] : family member [FreeTextEntry3] : daughter/HCP, RUBEN Laureano, [facility] coordinator [employer]

## 2022-07-20 NOTE — HISTORY OF PRESENT ILLNESS
[No falls in past year] : Patient reported no falls in the past year [Completely Independent] : Completely independent. [Completely Dependent] : Completely dependent. [With Patient/Caregiver] : , with patient/caregiver [Reviewed no changes] : Reviewed, no changes [Designated Healthcare Proxy] : Designated healthcare proxy [Name: ___] : Health Care Proxy's Name: [unfilled]  [Relationship: ___] : Relationship: [unfilled] [I will adhere to the patient's wishes.] : I will adhere to the patient's wishes. [FreeTextEntry1] : more assistance needed in the past week [FreeTextEntry2] : a [AdvancecareDate] : 06/22 [FreeTextEntry4] : HCP scanned in chart (completed by outside , not by our healthcare team):\par primary Sridevi Barnard (daughter) [phone]\par alternate: patient has 2 other daughters who would share decision making (spouse: Gerson Novalla)\par

## 2022-07-21 RX ORDER — GLUCOSAMINE HCL/CHONDROITIN SU 500-400 MG
3 CAPSULE ORAL
Qty: 30 | Refills: 3 | Status: ACTIVE | COMMUNITY
Start: 2022-02-24 | End: 1900-01-01

## 2022-08-03 RX ORDER — VALPROIC ACID 250 MG/5ML
250 SOLUTION ORAL
Qty: 1 | Refills: 0 | Status: ACTIVE | COMMUNITY
Start: 2022-07-13 | End: 1900-01-01

## 2022-08-03 RX ORDER — AMLODIPINE BESYLATE 5 MG/1
5 TABLET ORAL DAILY
Qty: 90 | Refills: 2 | Status: ACTIVE | COMMUNITY
Start: 2022-05-18 | End: 1900-01-01

## 2022-08-03 RX ORDER — ESCITALOPRAM OXALATE 5 MG/1
5 TABLET ORAL DAILY
Qty: 30 | Refills: 3 | Status: ACTIVE | COMMUNITY
Start: 2022-04-20 | End: 1900-01-01

## 2022-08-05 ENCOUNTER — APPOINTMENT (OUTPATIENT)
Dept: GERIATRICS | Facility: ASSISTED LIVING FACILITY | Age: 87
End: 2022-08-05

## 2022-08-05 VITALS
SYSTOLIC BLOOD PRESSURE: 140 MMHG | RESPIRATION RATE: 14 BRPM | DIASTOLIC BLOOD PRESSURE: 70 MMHG | HEART RATE: 64 BPM | OXYGEN SATURATION: 96 %

## 2022-08-05 DIAGNOSIS — M51.26 OTHER INTERVERTEBRAL DISC DISPLACEMENT, LUMBAR REGION: ICD-10-CM

## 2022-08-05 NOTE — HISTORY OF PRESENT ILLNESS
[No falls in past year] : Patient reported no falls in the past year [Independent] : transferring/mobility [] : Patient is continent. [Completely Dependent] : Completely dependent. [With Patient/Caregiver] : , with patient/caregiver [Reviewed no changes] : Reviewed, no changes [Designated Healthcare Proxy] : Designated healthcare proxy [Name: ___] : Health Care Proxy's Name: [unfilled]  [Relationship: ___] : Relationship: [unfilled] [I will adhere to the patient's wishes.] : I will adhere to the patient's wishes. [FreeTextEntry1] : more assistance needed in the past week [de-identified] : d [AdvancecareDate] : 06/22 [FreeTextEntry4] : HCP scanned in chart (completed by outside , not by our healthcare team):\par primary Sridevi Barnard (daughter) [phone]\par alternate: patient has 2 other daughters who would share decision making (spouse: Gerson Norman unclear current health status)\par

## 2022-08-05 NOTE — PHYSICAL EXAM
[Alert] : alert [Normal Oral Mucosa] : normal oral mucosa [No Respiratory Distress] : no respiratory distress [No Acc Muscle Use] : no accessory muscle use [Auscultation Breath Sounds / Voice Sounds] : lungs were clear to auscultation bilaterally [Murmurs] : no murmurs [Heart Rate And Rhythm] : heart rate was normal and rhythm regular [Abdomen Tenderness] : non-tender [No CVA Tenderness] : no CVA  tenderness [No Spinal Tenderness] : no spinal tenderness [Normal Color / Pigmentation] : normal skin color and pigmentation [de-identified] : sitting on bed. calmer and more cooperative with exam than in the past.  [de-identified] : remains visibly anxious, although more redirectable.

## 2022-08-05 NOTE — CURRENT MEDS
[] : missed dose(s) of medications. Reason(s): [Yes] : Reviewed medication list for presence of high-risk medications. [de-identified] : refuses medication

## 2022-08-05 NOTE — REASON FOR VISIT
[Follow-Up] : a follow-up visit [Formal Caregiver] : formal caregiver [Family Member] : family member [FreeTextEntry3] : daughter/HCP, RUBEN Laureano, [facility] coordinator [employer]

## 2022-08-05 NOTE — ASSESSMENT
[FreeTextEntry1] : Plan for f/u visit in 2-4 weeks depending on behaviors\par \par Plan of care discussed in detail with HCP/daughter and wellness RN Marilee sharp

## 2022-08-07 ENCOUNTER — APPOINTMENT (OUTPATIENT)
Dept: AFTER HOURS CARE | Facility: EMERGENCY ROOM | Age: 87
End: 2022-08-07

## 2022-08-07 DIAGNOSIS — G30.9 ALZHEIMER'S DISEASE, UNSPECIFIED: ICD-10-CM

## 2022-08-07 DIAGNOSIS — F02.81 ALZHEIMER'S DISEASE, UNSPECIFIED: ICD-10-CM

## 2022-08-07 PROCEDURE — 99204 OFFICE O/P NEW MOD 45 MIN: CPT | Mod: 95

## 2022-08-07 NOTE — PLAN
[No new medications perscribed] : Treat in place: No new medications prescribed [FreeTextEntry1] : move up depakote from 8pm tonight to now.\par continued excellent care at [facility] - especially RUBEN le\par this plan, as well as the pt's condition, was explained and agreed upon to daughter Sridevi, all questions answered
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
## 2022-08-07 NOTE — ASSESSMENT
[FreeTextEntry1] : likely unfortunate progression of dementia, but explained that we cannot evaluate for other etiologies of increased\par agitation (electrolytes, infection, etc.) via telemedicine, even if they are unlikely.\par we reviewed the pharmacological and nonpharmicological approaches to the agitatd pt together.

## 2022-08-10 ENCOUNTER — APPOINTMENT (OUTPATIENT)
Dept: GERIATRICS | Facility: ASSISTED LIVING FACILITY | Age: 87
End: 2022-08-10

## 2022-08-10 VITALS
HEART RATE: 66 BPM | OXYGEN SATURATION: 98 % | DIASTOLIC BLOOD PRESSURE: 88 MMHG | RESPIRATION RATE: 14 BRPM | SYSTOLIC BLOOD PRESSURE: 120 MMHG

## 2022-08-10 DIAGNOSIS — I10 ESSENTIAL (PRIMARY) HYPERTENSION: ICD-10-CM

## 2022-08-10 DIAGNOSIS — F03.91 UNSPECIFIED DEMENTIA WITH BEHAVIORAL DISTURBANCE: ICD-10-CM

## 2022-08-10 DIAGNOSIS — M25.531 PAIN IN RIGHT WRIST: ICD-10-CM

## 2022-08-10 RX ORDER — ACETAMINOPHEN 500 MG/1
500 TABLET, COATED ORAL EVERY 8 HOURS
Qty: 180 | Refills: 0 | Status: ACTIVE | COMMUNITY
Start: 2022-04-27 | End: 1900-01-01

## 2022-08-10 RX ORDER — IBUPROFEN 200 MG/1
200 TABLET ORAL EVERY 8 HOURS
Qty: 90 | Refills: 0 | Status: DISCONTINUED | COMMUNITY
Start: 2022-08-05 | End: 2022-08-10

## 2022-08-10 NOTE — CURRENT MEDS
[] : missed dose(s) of medications. Reason(s): [Yes] : Reviewed medication list for presence of high-risk medications. [de-identified] : refuses medication

## 2022-08-10 NOTE — ASSESSMENT
[FreeTextEntry1] : Plan for f/u visit in 2-4 weeks depending on behaviors\par \par Cyndi Bryson, RUTHIEP-BC

## 2022-08-10 NOTE — REASON FOR VISIT
[Family Member] : family member [Follow-Up] : a follow-up visit [Formal Caregiver] : formal caregiver [FreeTextEntry1] : Right wrist pain [FreeTextEntry3] : daughter/HCP, RUBEN Laureano, [facility] coordinator [employer]

## 2022-08-10 NOTE — HISTORY OF PRESENT ILLNESS
[No falls in past year] : Patient reported no falls in the past year [Independent] : transferring/mobility [] : Patient is continent. [Completely Dependent] : Completely dependent. [With Patient/Caregiver] : , with patient/caregiver [Reviewed no changes] : Reviewed, no changes [Designated Healthcare Proxy] : Designated healthcare proxy [Name: ___] : Health Care Proxy's Name: [unfilled]  [Relationship: ___] : Relationship: [unfilled] [I will adhere to the patient's wishes.] : I will adhere to the patient's wishes. [FreeTextEntry1] : more assistance needed in the past week [AdvancecareDate] : 08/22 [FreeTextEntry4] : HCP scanned in chart (completed by outside , not by our healthcare team):\par primary Sridevi Barnard (daughter) [phone]\par alternate: patient has 2 other daughters who would share decision making (spouse: Gerson Norman unclear current health status)\par

## 2022-08-10 NOTE — PHYSICAL EXAM
[Alert] : alert [Normal Oral Mucosa] : normal oral mucosa [No Respiratory Distress] : no respiratory distress [No Acc Muscle Use] : no accessory muscle use [Auscultation Breath Sounds / Voice Sounds] : lungs were clear to auscultation bilaterally [Murmurs] : no murmurs [Heart Rate And Rhythm] : heart rate was normal and rhythm regular [Abdomen Tenderness] : non-tender [No CVA Tenderness] : no CVA  tenderness [No Spinal Tenderness] : no spinal tenderness [Normal Color / Pigmentation] : normal skin color and pigmentation [Respiration, Rhythm And Depth] : normal respiratory rhythm and effort [Normal S1, S2] : normal S1 and S2 [Edema] : edema was not present [Pedal Pulses Normal] : the pedal pulses are present [Bowel Sounds] : normal bowel sounds [Abdomen Soft] : soft [de-identified] : sitting on outside in garden, somewhat calmer than usual [de-identified] : no evidence of injury to right wrist; no pain upon palpation, flexion or extension, no swelling, bruising, erythema, or warmth [de-identified] : visibly anxious with fleeting thoughts/nonlinear thought processes; more directable today than she has been in the past

## 2022-08-12 ENCOUNTER — NON-APPOINTMENT (OUTPATIENT)
Age: 87
End: 2022-08-12

## 2022-08-12 RX ORDER — OLANZAPINE 5 MG/1
5 TABLET, ORALLY DISINTEGRATING ORAL
Qty: 30 | Refills: 0 | Status: ACTIVE | COMMUNITY
Start: 2022-07-13 | End: 1900-01-01

## 2022-08-15 ENCOUNTER — INPATIENT (INPATIENT)
Facility: HOSPITAL | Age: 87
LOS: 10 days | Discharge: HOME | DRG: 56 | End: 2022-08-26
Attending: STUDENT IN AN ORGANIZED HEALTH CARE EDUCATION/TRAINING PROGRAM | Admitting: STUDENT IN AN ORGANIZED HEALTH CARE EDUCATION/TRAINING PROGRAM
Payer: MEDICARE

## 2022-08-15 VITALS
OXYGEN SATURATION: 95 % | HEART RATE: 86 BPM | TEMPERATURE: 98 F | DIASTOLIC BLOOD PRESSURE: 67 MMHG | HEIGHT: 67 IN | WEIGHT: 130.07 LBS | RESPIRATION RATE: 16 BRPM | SYSTOLIC BLOOD PRESSURE: 151 MMHG

## 2022-08-15 DIAGNOSIS — K21.9 GASTRO-ESOPHAGEAL REFLUX DISEASE WITHOUT ESOPHAGITIS: ICD-10-CM

## 2022-08-15 DIAGNOSIS — R33.9 RETENTION OF URINE, UNSPECIFIED: ICD-10-CM

## 2022-08-15 DIAGNOSIS — E78.5 HYPERLIPIDEMIA, UNSPECIFIED: ICD-10-CM

## 2022-08-15 DIAGNOSIS — G30.9 ALZHEIMER'S DISEASE, UNSPECIFIED: ICD-10-CM

## 2022-08-15 DIAGNOSIS — Z29.9 ENCOUNTER FOR PROPHYLACTIC MEASURES, UNSPECIFIED: ICD-10-CM

## 2022-08-15 DIAGNOSIS — F03.91 UNSPECIFIED DEMENTIA WITH BEHAVIORAL DISTURBANCE: ICD-10-CM

## 2022-08-15 DIAGNOSIS — M54.30 SCIATICA, UNSPECIFIED SIDE: ICD-10-CM

## 2022-08-15 LAB
ALBUMIN SERPL ELPH-MCNC: 4.3 G/DL — SIGNIFICANT CHANGE UP (ref 3.3–5)
ALP SERPL-CCNC: 54 U/L — SIGNIFICANT CHANGE UP (ref 40–120)
ALT FLD-CCNC: 14 U/L — SIGNIFICANT CHANGE UP (ref 10–45)
ANION GAP SERPL CALC-SCNC: 11 MMOL/L — SIGNIFICANT CHANGE UP (ref 5–17)
APPEARANCE UR: CLEAR — SIGNIFICANT CHANGE UP
AST SERPL-CCNC: 30 U/L — SIGNIFICANT CHANGE UP (ref 10–40)
BACTERIA # UR AUTO: PRESENT /HPF
BASOPHILS # BLD AUTO: 0.03 K/UL — SIGNIFICANT CHANGE UP (ref 0–0.2)
BASOPHILS NFR BLD AUTO: 0.5 % — SIGNIFICANT CHANGE UP (ref 0–2)
BILIRUB SERPL-MCNC: 0.5 MG/DL — SIGNIFICANT CHANGE UP (ref 0.2–1.2)
BILIRUB UR-MCNC: NEGATIVE — SIGNIFICANT CHANGE UP
BUN SERPL-MCNC: 15 MG/DL — SIGNIFICANT CHANGE UP (ref 7–23)
CALCIUM SERPL-MCNC: 9.5 MG/DL — SIGNIFICANT CHANGE UP (ref 8.4–10.5)
CHLORIDE SERPL-SCNC: 102 MMOL/L — SIGNIFICANT CHANGE UP (ref 96–108)
CK MB CFR SERPL CALC: 10.8 NG/ML — HIGH (ref 0–6.7)
CK SERPL-CCNC: 442 U/L — HIGH (ref 25–170)
CO2 SERPL-SCNC: 29 MMOL/L — SIGNIFICANT CHANGE UP (ref 22–31)
COLOR SPEC: YELLOW — SIGNIFICANT CHANGE UP
CREAT SERPL-MCNC: 0.86 MG/DL — SIGNIFICANT CHANGE UP (ref 0.5–1.3)
DIFF PNL FLD: ABNORMAL
DIFF PNL FLD: NEGATIVE — SIGNIFICANT CHANGE UP
EGFR: 66 ML/MIN/1.73M2 — SIGNIFICANT CHANGE UP
EOSINOPHIL # BLD AUTO: 0.05 K/UL — SIGNIFICANT CHANGE UP (ref 0–0.5)
EOSINOPHIL NFR BLD AUTO: 0.9 % — SIGNIFICANT CHANGE UP (ref 0–6)
EPI CELLS # UR: SIGNIFICANT CHANGE UP /HPF (ref 0–5)
GLUCOSE BLDC GLUCOMTR-MCNC: 137 MG/DL — HIGH (ref 70–99)
GLUCOSE SERPL-MCNC: 110 MG/DL — HIGH (ref 70–99)
GLUCOSE UR QL: NEGATIVE — SIGNIFICANT CHANGE UP
HCT VFR BLD CALC: 45.3 % — HIGH (ref 34.5–45)
HGB BLD-MCNC: 14.8 G/DL — SIGNIFICANT CHANGE UP (ref 11.5–15.5)
IMM GRANULOCYTES NFR BLD AUTO: 0.2 % — SIGNIFICANT CHANGE UP (ref 0–1.5)
KETONES UR-MCNC: 15 MG/DL
LEUKOCYTE ESTERASE UR-ACNC: NEGATIVE — SIGNIFICANT CHANGE UP
LYMPHOCYTES # BLD AUTO: 2.19 K/UL — SIGNIFICANT CHANGE UP (ref 1–3.3)
LYMPHOCYTES # BLD AUTO: 38.4 % — SIGNIFICANT CHANGE UP (ref 13–44)
MCHC RBC-ENTMCNC: 29.8 PG — SIGNIFICANT CHANGE UP (ref 27–34)
MCHC RBC-ENTMCNC: 32.7 GM/DL — SIGNIFICANT CHANGE UP (ref 32–36)
MCV RBC AUTO: 91.3 FL — SIGNIFICANT CHANGE UP (ref 80–100)
MONOCYTES # BLD AUTO: 0.45 K/UL — SIGNIFICANT CHANGE UP (ref 0–0.9)
MONOCYTES NFR BLD AUTO: 7.9 % — SIGNIFICANT CHANGE UP (ref 2–14)
NEUTROPHILS # BLD AUTO: 2.97 K/UL — SIGNIFICANT CHANGE UP (ref 1.8–7.4)
NEUTROPHILS NFR BLD AUTO: 52.1 % — SIGNIFICANT CHANGE UP (ref 43–77)
NITRITE UR-MCNC: NEGATIVE — SIGNIFICANT CHANGE UP
NRBC # BLD: 0 /100 WBCS — SIGNIFICANT CHANGE UP (ref 0–0)
PH UR: 6.5 — SIGNIFICANT CHANGE UP (ref 5–8)
PH UR: 7.5 — SIGNIFICANT CHANGE UP (ref 5–8)
PLATELET # BLD AUTO: 176 K/UL — SIGNIFICANT CHANGE UP (ref 150–400)
POTASSIUM SERPL-MCNC: 4 MMOL/L — SIGNIFICANT CHANGE UP (ref 3.5–5.3)
POTASSIUM SERPL-SCNC: 4 MMOL/L — SIGNIFICANT CHANGE UP (ref 3.5–5.3)
PROT SERPL-MCNC: 7.4 G/DL — SIGNIFICANT CHANGE UP (ref 6–8.3)
PROT UR-MCNC: NEGATIVE MG/DL — SIGNIFICANT CHANGE UP
RBC # BLD: 4.96 M/UL — SIGNIFICANT CHANGE UP (ref 3.8–5.2)
RBC # FLD: 12.5 % — SIGNIFICANT CHANGE UP (ref 10.3–14.5)
RBC CASTS # UR COMP ASSIST: ABNORMAL /HPF
SARS-COV-2 RNA SPEC QL NAA+PROBE: NEGATIVE — SIGNIFICANT CHANGE UP
SODIUM SERPL-SCNC: 142 MMOL/L — SIGNIFICANT CHANGE UP (ref 135–145)
SP GR SPEC: 1.01 — SIGNIFICANT CHANGE UP (ref 1–1.03)
TSH SERPL-MCNC: 0.56 UIU/ML — SIGNIFICANT CHANGE UP (ref 0.27–4.2)
UROBILINOGEN FLD QL: 0.2 E.U./DL — SIGNIFICANT CHANGE UP
UROBILINOGEN FLD QL: 1 E.U./DL — SIGNIFICANT CHANGE UP
VALPROATE SERPL-MCNC: 100.5 UG/ML — HIGH (ref 50–100)
WBC # BLD: 5.7 K/UL — SIGNIFICANT CHANGE UP (ref 3.8–10.5)
WBC # FLD AUTO: 5.7 K/UL — SIGNIFICANT CHANGE UP (ref 3.8–10.5)
WBC UR QL: < 5 /HPF — SIGNIFICANT CHANGE UP

## 2022-08-15 PROCEDURE — 93010 ELECTROCARDIOGRAM REPORT: CPT

## 2022-08-15 PROCEDURE — 71045 X-RAY EXAM CHEST 1 VIEW: CPT | Mod: 26

## 2022-08-15 PROCEDURE — 99285 EMERGENCY DEPT VISIT HI MDM: CPT | Mod: 25

## 2022-08-15 PROCEDURE — 99222 1ST HOSP IP/OBS MODERATE 55: CPT

## 2022-08-15 RX ORDER — LANOLIN ALCOHOL/MO/W.PET/CERES
3 CREAM (GRAM) TOPICAL AT BEDTIME
Refills: 0 | Status: DISCONTINUED | OUTPATIENT
Start: 2022-08-15 | End: 2022-08-26

## 2022-08-15 RX ORDER — DIPHENHYDRAMINE HCL 50 MG
25 CAPSULE ORAL ONCE
Refills: 0 | Status: COMPLETED | OUTPATIENT
Start: 2022-08-15 | End: 2022-08-15

## 2022-08-15 RX ORDER — OLANZAPINE 15 MG/1
2.5 TABLET, FILM COATED ORAL ONCE
Refills: 0 | Status: COMPLETED | OUTPATIENT
Start: 2022-08-15 | End: 2022-08-15

## 2022-08-15 RX ORDER — SODIUM CHLORIDE 9 MG/ML
1000 INJECTION INTRAMUSCULAR; INTRAVENOUS; SUBCUTANEOUS ONCE
Refills: 0 | Status: COMPLETED | OUTPATIENT
Start: 2022-08-15 | End: 2022-08-15

## 2022-08-15 RX ORDER — LANOLIN ALCOHOL/MO/W.PET/CERES
5 CREAM (GRAM) TOPICAL ONCE
Refills: 0 | Status: DISCONTINUED | OUTPATIENT
Start: 2022-08-15 | End: 2022-08-15

## 2022-08-15 RX ADMIN — SODIUM CHLORIDE 1000 MILLILITER(S): 9 INJECTION INTRAMUSCULAR; INTRAVENOUS; SUBCUTANEOUS at 15:42

## 2022-08-15 RX ADMIN — Medication 25 MILLIGRAM(S): at 21:03

## 2022-08-15 RX ADMIN — OLANZAPINE 2.5 MILLIGRAM(S): 15 TABLET, FILM COATED ORAL at 14:20

## 2022-08-15 RX ADMIN — OLANZAPINE 2.5 MILLIGRAM(S): 15 TABLET, FILM COATED ORAL at 13:30

## 2022-08-15 RX ADMIN — OLANZAPINE 2.5 MILLIGRAM(S): 15 TABLET, FILM COATED ORAL at 19:44

## 2022-08-15 NOTE — ED ADULT NURSE NOTE - NSIMPLEMENTINTERV_GEN_ALL_ED
Implemented All Fall with Harm Risk Interventions:  [place] to call system. Call bell, personal items and telephone within reach. Instruct patient to call for assistance. Room bathroom lighting operational. Non-slip footwear when patient is off stretcher. Physically safe environment: no spills, clutter or unnecessary equipment. Stretcher in lowest position, wheels locked, appropriate side rails in place. Provide visual cue, wrist band, yellow gown, etc. Monitor gait and stability. Monitor for mental status changes and reorient to person, place, and time. Review medications for side effects contributing to fall risk. Reinforce activity limits and safety measures with patient and family. Provide visual clues: red socks. Implemented All Fall with Harm Risk Interventions:  [place] to call system. Call bell, personal items and telephone within reach. Instruct patient to call for assistance. Room bathroom lighting operational. Non-slip footwear when patient is off stretcher. Physically safe environment: no spills, clutter or unnecessary equipment. Stretcher in lowest position, wheels locked, appropriate side rails in place. Provide visual cue, wrist band, yellow gown, etc. Monitor gait and stability. Monitor for mental status changes and reorient to person, place, and time. Review medications for side effects contributing to fall risk. Reinforce activity limits and safety measures with patient and family. Provide visual clues: red socks. Implemented All Fall with Harm Risk Interventions:  [place] to call system. Call bell, personal items and telephone within reach. Instruct patient to call for assistance. Room bathroom lighting operational. Non-slip footwear when patient is off stretcher. Physically safe environment: no spills, clutter or unnecessary equipment. Stretcher in lowest position, wheels locked, appropriate side rails in place. Provide visual cue, wrist band, yellow gown, etc. Monitor gait and stability. Monitor for mental status changes and reorient to person, place, and time. Review medications for side effects contributing to fall risk. Reinforce activity limits and safety measures with patient and family. Provide visual clues: red socks.

## 2022-08-15 NOTE — ED ADULT NURSE REASSESSMENT NOTE - NS ED NURSE REASSESS COMMENT FT1
covering primary RN, patient with possible adverse reaction to administered medication. pt restless, sitting up in bed, unable to sit still. admitting resident bedside. requesting EKG
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
## 2022-08-15 NOTE — ED PROVIDER NOTE - PHYSICAL EXAMINATION
VITAL SIGNS: I have reviewed nursing notes and confirm.  CONSTITUTIONAL: Well appearing, in no acute distress.   SKIN:  warm and dry, no acute rash.   HEAD:  normocephalic, atraumatic.  EYES: EOM intact; conjunctiva and sclera clear.  ENT: No nasal discharge; airway clear.   NECK: Supple; non tender.  CARD: S1, S2 normal; no murmurs, gallops, or rubs. Regular rate and rhythm.   RESP:  Clear to auscultation b/l, no wheezes, rales or rhonchi.  ABD: Normal bowel sounds; soft; non-distended; non-tender; no guarding/ rebound.  EXT: Normal ROM. No clubbing, cyanosis or edema. 2+ pulses to b/l ue/le. Moving all extremities.  NEURO: Alert, oriented, grossly unremarkable  PSYCH: uncooperative, mood and affect inappropriate. Agitated on arrival. VITAL SIGNS: I have reviewed nursing notes and confirm.  CONSTITUTIONAL: Elderly f, drowsy appearing, will not lie still in stretcher.   SKIN:  warm and dry, no acute rash.   HEAD:  normocephalic, atraumatic.  EYES: EOM intact; conjunctiva and sclera clear.  ENT: No nasal discharge; airway clear.   NECK: Supple; non tender.  CARD: S1, S2 normal; no murmurs, gallops, or rubs. Regular rate and rhythm.   RESP:  Clear to auscultation b/l, no wheezes, rales or rhonchi.  ABD: Normal bowel sounds; soft; non-distended; non-tender; no guarding/ rebound.  EXT: Normal ROM. No clubbing, cyanosis or edema. 2+ pulses to b/l ue/le. Moving all extremities.  NEURO: Somnolent, responds to name calling, moving all ext.   PSYCH: uncooperative and agitated. VITAL SIGNS: I have reviewed nursing notes and confirm.  CONSTITUTIONAL: Elderly f, drowsy appearing, will not lie still in stretcher.   SKIN:  warm and dry, no acute rash.   HEAD:  normocephalic, atraumatic.  EYES: EOM intact; conjunctiva and sclera clear.  ENT: No nasal discharge; airway clear.   NECK: Supple; non tender.  CARD: S1, S2 normal; no murmurs, gallops, or rubs. Regular rate and rhythm.   RESP:  Clear to auscultation b/l, no wheezes, rales or rhonchi.  ABD: Normal bowel sounds; soft; non-distended; non-tender; no guarding/ rebound.  EXT: Normal ROM. No clubbing, cyanosis or edema. 2+ pulses to b/l ue/le. Moving all extremities.  NEURO: Somnolent, responds to name calling, moving all ext, refusing to lie still.   PSYCH: uncooperative and agitated.

## 2022-08-15 NOTE — H&P ADULT - PROBLEM SELECTOR PLAN 1
Patient with Alzheimer's and possible frontotemporal dementia (no previous MRIs) presenting with worsening agitation, likely in setting of urinary retention, medication noncompliance, sciatica.    - Treat sciatica pain and urinary retention   - Neurology consult in AM  - Psych following with recs as below   - Continue 1:1 for safety  - Decrease Depakote to 250mg bid  - Start Seroquel 50mg bid PO crushed for mood instability  - Continue Lexapro 5mg daily for mood and anxiety  - Delirium precautions (frequent redirection, familiar objects, family members present, lights off at night, avoid benzos, opioids, anticholinergic medications)

## 2022-08-15 NOTE — PATIENT PROFILE ADULT - PATIENT'S PREFERRED PRONOUN
[FreeTextEntry1] : GENERAL PHYSICAL EXAM:\par GEN: no distress, normal affect\par HEENT: NCAT, OP clear\par EYES: sclera white, conjunctiva clear, no nystagmus\par NECK: supple\par CV: normal rhythm\par PULM: no respiratory distress, normal rhythm and effort\par EXT: no edema, no cyanosis\par MSK: muscle tone and strength normal\par SKIN: warm, dry, no rash or lesion on exposed skin \par \par NEUROLOGICAL EXAM:\par Mental Status\par Orientation: alert and oriented to person, place, time, and situation, 3/3 recall after 5 minutes\par Language: clear and fluent, intact comprehension and repetition, intact naming and reading\par \par Cranial Nerves\par II: visual fields full to confrontation \par III, IV, VI: PERRL, EOMI\par V, VII: facial sensation and movement intact and symmetric \par VIII: hearing intact \par IX, X: uvula midline, soft palate elevates normally \par XI: BL shoulder shrug intact \par XII: tongue midline\par \par Motor\par Shoulder abd: 5 (R), 5 (L)\par EF/EE: 5 (R), 5 (L)\par hand : 5 (R), 5 (L)\par HF/HE: 5 (R), 5 (L)\par KF/KE: 5 (R), 5 (L)\par DF/PF: 5 (R), 5 (L) \par Tone and bulk are normal in upper and lower limbs\par No pronator drift\par \par Sensation\par Intact to light touch in all 4 EXTs\par \par Reflex\par 2+ in BL biceps, brachioradialis, patella\par \par Coordination\par Normal FTN bilaterally\par Dysdiadochokinesia not present. \par Able to perform rapid, alternating movements\par \par Gait\par Normal stance, stride, and pivot turn\par Tandem walk intact\par Negative Romberg  Her/She

## 2022-08-15 NOTE — H&P ADULT - NSHPLABSRESULTS_GEN_ALL_CORE
LABS:                        14.8   5.70  )-----------( 176      ( 15 Aug 2022 14:19 )             45.3     08-15    142  |  102  |  15  ----------------------------<  110<H>  4.0   |  29  |  0.86    Ca    9.5      15 Aug 2022 14:19    TPro  7.4  /  Alb  4.3  /  TBili  0.5  /  DBili  x   /  AST  30  /  ALT  14  /  AlkPhos  54  08-15      Urinalysis Basic - ( 15 Aug 2022 22:03 )    Color: Yellow / Appearance: Clear / S.015 / pH: x  Gluc: x / Ketone: 15 mg/dL  / Bili: Negative / Urobili: 0.2 E.U./dL   Blood: x / Protein: NEGATIVE mg/dL / Nitrite: NEGATIVE   Leuk Esterase: NEGATIVE / RBC: 5-10 /HPF / WBC < 5 /HPF   Sq Epi: x / Non Sq Epi: 0-5 /HPF / Bacteria: Present /HPF

## 2022-08-15 NOTE — ED BEHAVIORAL HEALTH ASSESSMENT NOTE - NSBHATTESTBILLING_PSY_A_CORE
[id number] diagnostic evaluation with medical services [id number] diagnostic evaluation with medical services [id number] diagnostic evaluation with medical services
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
## 2022-08-15 NOTE — H&P ADULT - PROBLEM SELECTOR PLAN 2
Patient with urinary retention no admission which likely contributed to symptoms. Etiology unclear, may be in setting of Detrusor underactivity vs medication side effect.   - S/c q6 hrs and if no improvement consult urology

## 2022-08-15 NOTE — BH CONSULTATION LIAISON ASSESSMENT NOTE - NSBHCHARTREVIEWVS_PSY_A_CORE FT
Vital Signs Last 24 Hrs  T(C): 36.5 (15 Aug 2022 13:11), Max: 36.5 (15 Aug 2022 13:11)  T(F): 97.7 (15 Aug 2022 13:11), Max: 97.7 (15 Aug 2022 13:11)  HR: 121 (15 Aug 2022 16:51) (86 - 121)  BP: 143/79 (15 Aug 2022 16:51) (143/79 - 151/67)  BP(mean): --  RR: 20 (15 Aug 2022 16:51) (16 - 20)  SpO2: 95% (15 Aug 2022 16:51) (95% - 95%)    Parameters below as of 15 Aug 2022 16:51  Patient On (Oxygen Delivery Method): room air

## 2022-08-15 NOTE — H&P ADULT - NSHPPHYSICALEXAM_GEN_ALL_CORE
VITALS:   T(C): 36.3 (08-15-22 @ 21:52), Max: 36.7 (08-15-22 @ 13:45)  HR: 115 (08-15-22 @ 22:00) (86 - 124)  BP: 165/66 (08-15-22 @ 22:00) (143/70 - 167/75)  RR: 20 (08-15-22 @ 22:00) (16 - 20)  SpO2: 96% (08-15-22 @ 22:00) (95% - 98%)    GENERAL: patient restless and tremulous, appears agitated trying to get out of bed  HEAD:  Atraumatic, normocephalic  EYES: PERRLA, conjunctiva and sclera clear  ENT: Moist mucous membranes  NECK: Supple, no JVD  HEART: Regular rate and rhythm, no murmurs, rubs, or gallops  LUNGS: Unlabored respirations.  Clear to auscultation bilaterally, no crackles, wheezing, or rhonchi  ABDOMEN: Soft, +BS, suprapubic distension and tenderness  EXTREMITIES: 2+ peripheral pulses bilaterally. No clubbing, cyanosis, or edema  NERVOUS SYSTEM:  A&Ox0, no focal deficits   SKIN: No rashes or lesions

## 2022-08-15 NOTE — ED PROVIDER NOTE - PROGRESS NOTE DETAILS
Susan: Received s/o pending psych eval. HR improving, still occasionally pulling at restraints. Rediscussed w/ psych, feel that safest location for pt is medicine, psych to follow and provide med recs. Updated daughter at bedside, will admit medicine for further care.

## 2022-08-15 NOTE — H&P ADULT - PROBLEM SELECTOR PLAN 6
F: s/p 1L NS bolus  E: replete PRN  N: regular diet pending passing dysphagia screen   GI: none  DVT: Lovenox  Code: Full  Dispo: [facility] F: s/p 1L NS bolus  E: replete PRN  N: regular diet pending passing dysphagia screen   GI: none  DVT: Lovenox  Code: Full  Dispo: [facility] F: s/p 1L NS bolus  E: replete PRN  N: regular diet pending passing dysphagia screen   GI: none  DVT: Lovenox  Code: Full  Dispo: [facility], [place]
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
## 2022-08-15 NOTE — H&P ADULT - PROBLEM/PLAN-4
Identified pt with two pt identifiers(name and ). Chief Complaint   Patient presents with    Immunization/Injection     required vaccines for college acceptance COVID vacc (she is having done tomorrow), Hep B, MMR IPV        Health Maintenance Due   Topic    Hepatitis C Screening     Hepatitis A Peds Age 1-18 (2 of 2 - 2-dose series)    HPV Age 9Y-34Y (1 - 2-dose series)    COVID-19 Vaccine (1)       Wt Readings from Last 3 Encounters:   21 132 lb (59.9 kg)   19 124 lb (56.2 kg) (48 %, Z= -0.04)*   19 126 lb (57.2 kg) (52 %, Z= 0.06)*     * Growth percentiles are based on CDC (Girls, 2-20 Years) data. Temp Readings from Last 3 Encounters:   21 98 °F (36.7 °C) (Temporal)   19 98.9 °F (37.2 °C) (Oral)     BP Readings from Last 3 Encounters:   21 117/61   19 110/70   19 130/80     Pulse Readings from Last 3 Encounters:   21 85   19 80   19 69         Learning Assessment:  :     Learning Assessment 2021   PRIMARY LEARNER Patient   HIGHEST LEVEL OF EDUCATION - PRIMARY LEARNER  SOME COLLEGE   BARRIERS PRIMARY LEARNER NONE   CO-LEARNER CAREGIVER No   PRIMARY LANGUAGE ENGLISH   LEARNER PREFERENCE PRIMARY DEMONSTRATION     LISTENING   ANSWERED BY patient   RELATIONSHIP SELF       Depression Screening:  :     3 most recent PHQ Screens 2021   Little interest or pleasure in doing things Not at all   Feeling down, depressed, irritable, or hopeless Not at all   Total Score PHQ 2 0       Fall Risk Assessment:  :     No flowsheet data found. Abuse Screening:  :     Abuse Screening Questionnaire 2021   Do you ever feel afraid of your partner? N   Are you in a relationship with someone who physically or mentally threatens you? N   Is it safe for you to go home?  Y       Coordination of Care Questionnaire:  :     1) Have you been to an emergency room, urgent care clinic since your last visit? no   Hospitalized since your last visit? no 2) Have you seen or consulted any other health care providers outside of [address] [place] [address] since your last visit? no  (Include any pap smears or colon screenings in this section.)    3) Do you have an Advance Directive on file? no  Are you interested in receiving information about Advance Directives? no    Patient is accompanied by self. Reviewed record in preparation for visit and have obtained necessary documentation. Medication reconciliation up to date and corrected with patient at this time. DISPLAY PLAN FREE TEXT
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
## 2022-08-15 NOTE — ED ADULT TRIAGE NOTE - OTHER COMPLAINTS
as per EMS ,  "she did this last week and went to [facility] for being combative." as per EMS ,  "she did this last week and went to [facility] for being combative." as per EMS ,  "she did this last week and went to [facility] for being combative."
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
## 2022-08-15 NOTE — BH CONSULTATION LIAISON ASSESSMENT NOTE - ABORTED (SELF-INTERRUPTED) ATTEMPT:
1. Take metolazone 2.5 mg every other day, 30 min prior to other water pills  2. Reduce sprionolactone to 100 mg daily  3. Add lasix 40 mg daily  4. Weekly labs x 4   5. Continue with liver tx evaluation  Return 4 weeks   None known

## 2022-08-15 NOTE — PATIENT PROFILE ADULT - FALL HARM RISK - HARM RISK INTERVENTIONS
Assistance OOB with selected safe patient handling equipment/Communicate Risk of Fall with Harm to all staff/Monitor for mental status changes/Move patient closer to nurses' station/Reinforce activity limits and safety measures with patient and family/Reorient to person, place and time as needed/Tailored Fall Risk Interventions/Toileting schedule using arm’s reach rule for commode and bathroom/Use of alarms - bed, chair and/or voice tab/Visual Cue: Yellow wristband and red socks/Bed in lowest position, wheels locked, appropriate side rails in place/Call bell, personal items and telephone in reach/Instruct patient to call for assistance before getting out of bed or chair/Non-slip footwear when patient is out of bed/[place] to call system/Physically safe environment - no spills, clutter or unnecessary equipment/Purposeful Proactive Rounding/Room/bathroom lighting operational, light cord in reach Assistance OOB with selected safe patient handling equipment/Communicate Risk of Fall with Harm to all staff/Monitor for mental status changes/Move patient closer to nurses' station/Reinforce activity limits and safety measures with patient and family/Reorient to person, place and time as needed/Tailored Fall Risk Interventions/Toileting schedule using arm’s reach rule for commode and bathroom/Use of alarms - bed, chair and/or voice tab/Visual Cue: Yellow wristband and red socks/Bed in lowest position, wheels locked, appropriate side rails in place/Call bell, personal items and telephone in reach/Instruct patient to call for assistance before getting out of bed or chair/Non-slip footwear when patient is out of bed/[place] to call system/Physically safe environment - no spills, clutter or unnecessary equipment/Purposeful Proactive Rounding/Room/bathroom lighting operational, light cord in reach Assistance OOB with selected safe patient handling equipment/Communicate Risk of Fall with Harm to all staff/Monitor for mental status changes/Move patient closer to nurses' station/Reinforce activity limits and safety measures with patient and family/Reorient to person, place and time as needed/Tailored Fall Risk Interventions/Toileting schedule using arm’s reach rule for commode and bathroom/Use of alarms - bed, chair and/or voice tab/Visual Cue: Yellow wristband and red socks/Bed in lowest position, wheels locked, appropriate side rails in place/Call bell, personal items and telephone in reach/Instruct patient to call for assistance before getting out of bed or chair/Non-slip footwear when patient is out of bed/[place] to call system/Physically safe environment - no spills, clutter or unnecessary equipment/Purposeful Proactive Rounding/Room/bathroom lighting operational, light cord in reach
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
## 2022-08-15 NOTE — ED BEHAVIORAL HEALTH ASSESSMENT NOTE - RISK ASSESSMENT
Low Acute Suicide Risk Chronic risk factors include current neurocognitive disorder and impulsivity. Acute risk factors include restlessness, anxiety. Mitigating factors include support system, engagement with treatment at times, fear of killing self, no known SI. Overall patient is low acute/chronic suicide risk.

## 2022-08-15 NOTE — H&P ADULT - NSHPSOCIALHISTORY_GEN_ALL_CORE
Patient lives at [facility].  Per daughter, no history of smoking, recent alcohol use, or other drug use Patient lives at [facility].  Per daughter, no history of smoking, recent alcohol use, or other drug use Patient lives at [facility].  Per daughter, no history of smoking, recent alcohol use, or other drug use

## 2022-08-15 NOTE — ED ADULT NURSE REASSESSMENT NOTE - NS ED NURSE REASSESS COMMENT FT1
Pt straight cathed for urine. 500ml out. Sterile technique used. Samples sent to lab for analysis. Pt remains on constant observation.

## 2022-08-15 NOTE — ED BEHAVIORAL HEALTH ASSESSMENT NOTE - VIOLENCE RISK FACTORS:
Feeling of being under threat and being unable to control threat/Affective dysregulation/Impulsivity/Lack of insight into violence risk/need for treatment

## 2022-08-15 NOTE — ED PROVIDER NOTE - NSICDXPASTMEDICALHX_GEN_ALL_CORE_FT
PAST MEDICAL HISTORY:  Dementia with behavioral disturbance     GERD (gastroesophageal reflux disease)     HLD (hyperlipidemia)     Sciatica

## 2022-08-15 NOTE — ED BEHAVIORAL HEALTH ASSESSMENT NOTE - DESCRIPTION
Daughter lives nearby, patient lives at [facility] Daughter lives nearby, patient lives at [facility] Daughter lives nearby, patient lives at [facility] See HPI CBC, BMP within normal limits  Urinalysis within normal limits  Depakote level 100.5

## 2022-08-15 NOTE — H&P ADULT - PROBLEM SELECTOR PLAN 3
Patient with history of hyperlipidemia, not currently on any medications.    - Can consider lipid panel once patient less agitated

## 2022-08-15 NOTE — H&P ADULT - PROBLEM SELECTOR PLAN 5
Patient with history of sciatica, per daughter she frequently has back pain but not currently on any medications.  It's possibly back pain was contributing to behavioral disturbance but she was unable to express.    - If patient remains agitated after resolution of urinary retention, will try Toradol IV

## 2022-08-15 NOTE — ED PROVIDER NOTE - CLINICAL SUMMARY MEDICAL DECISION MAKING FREE TEXT BOX
Impression: 88 y/o hx of HLD, GERD, Dementia with behavioral disturbance, nontoxic multinodular disorder, Cyatica sent from nursing home for agitation and violent behavior. Pt noted to be agitated and violent on arrival.  Zyprexa 2.5 mg given with improvement of agitation. Will check labs, obtain CXR and US to rule out infection and CT to rule out intracranial bleed or infarct. Will involve social work to reach out to nursing home to obtain collateral information. Impression: 86 y/o hx of HLD, GERD, Dementia with behavioral disturbance, nontoxic multinodular disorder, scitica, sent from assisted living for agitation and violent behavior. Pt's daughter at bedside and stating pt has h/o similar behaviour since Feb '22, was tx'd w/ lexapro and seroquel w/ improvement. However pt refused taking medications for several days in June with significant decline in mental status and more frequent outbursts. Was hospitalized at [facility] x 3 wks in July and tried on various medications including zyprexa, depakote, lexapro. Ct head and cxr which were neg at the time. AVSS. Pt given zyprexa 5mg im. Will Impression: 88 y/o hx of HLD, GERD, Dementia with behavioral disturbance, nontoxic multinodular disorder, scitica, sent from assisted living for agitation and violent behavior. Pt's daughter at bedside and stating pt has h/o similar behaviour since Feb '22, was tx'd w/ lexapro and seroquel w/ improvement. However pt refused taking medications for several days in June with significant decline in mental status and more frequent outbursts. Was hospitalized at [facility] x 3 wks in July and tried on various medications including zyprexa, depakote, lexapro. Ct head and cxr which were neg at the time. AVSS. Pt given zyprexa 5mg im. Will Impression: 88 y/o hx of HLD, GERD, Dementia with behavioral disturbance, nontoxic multinodular disorder, scitica, sent from assisted living for agitation and violent behavior. Pt's daughter at bedside and stating pt has h/o similar behaviour since Feb '22, was tx'd w/ lexapro and seroquel w/ improvement. However pt refused taking medications for several days in June with significant decline in mental status and more frequent outbursts. Was hospitalized at [facility] x 3 wks in July and tried on various medications including zyprexa, depakote, lexapro. Ct head and cxr which were neg at the time. AVSS. Pt given zyprexa 5mg im. Will Impression: 88 y/o hx of HLD, GERD, Dementia with behavioral disturbance, nontoxic multinodular disorder, scitica, sent from assisted living for agitation and violent behavior. Pt's daughter at bedside and stating pt has h/o similar behaviour since Feb '22, was tx'd w/ lexapro and seroquel w/ improvement. However pt refused taking medications for several days in June with significant decline in mental status and more frequent outbursts. Was hospitalized at [facility] x 3 wks in July and tried on various medications including zyprexa, depakote, lexapro. Ct head and cxr which were neg at the time. AVSS. Pt given zyprexa 5mg im. Labs unremarkable w/ no leukocytosis, electrolyte imbalance; vpa just above therapeutic range. UA neg for infection, + small ketones. CXR neg for i/e. Pt seen by . Assisted living will not take pt back to residence due to pt's difficult behavior. Will admit to medical svc pending placement. Pt to be seen as psychiatrist as inpt to make recommendations regarding medications. Impression: 88 y/o hx of HLD, GERD, Dementia with behavioral disturbance, nontoxic multinodular disorder, scitica, sent from assisted living for agitation and violent behavior. Pt's daughter at bedside and stating pt has h/o similar behaviour since Feb '22, was tx'd w/ lexapro and seroquel w/ improvement. However pt refused taking medications for several days in June with significant decline in mental status and more frequent outbursts. Was hospitalized at [facility] x 3 wks in July and tried on various medications including zyprexa, depakote, lexapro. Ct head and cxr which were neg at the time. AVSS. Pt given zyprexa 5mg im. Labs unremarkable w/ no leukocytosis, electrolyte imbalance; vpa just above therapeutic range. UA neg for infection, + small ketones. CXR neg for i/e. Pt seen by . Assisted living will not take pt back to residence due to pt's difficult behavior. Will admit to medical svc pending placement. Pt to be seen as psychiatrist as inpt to make recommendations regarding medications. Impression: 88 y/o hx of HLD, GERD, Dementia with behavioral disturbance, nontoxic multinodular disorder, scitica, sent from assisted living for agitation and violent behavior. Pt's daughter at bedside and stating pt has h/o similar behaviour since Feb '22, was tx'd w/ lexapro and seroquel w/ improvement. However pt refused taking medications for several days in June with significant decline in mental status and more frequent outbursts. Was hospitalized at [facility] x 3 wks in July and tried on various medications including zyprexa, depakote, lexapro. Ct head and cxr which were neg at the time. AVSS. Pt given zyprexa 5mg im. Labs unremarkable w/ no leukocytosis, electrolyte imbalance; vpa just above therapeutic range. UA neg for infection, + small ketones. CXR neg for i/e. Pt seen by . Assisted living will not take pt back to residence due to pt's difficult behavior. Will admit to medical svc pending placement. Pt to be seen as psychiatrist as inpt to make recommendations regarding medications. Impression: 88 y/o hx of HLD, GERD, Dementia with behavioral disturbance, nontoxic multinodular disorder, scitica, sent from assisted living for agitation and violent behavior. Pt's daughter at bedside and stating pt has h/o similar behaviour since Feb '22, was tx'd w/ lexapro and seroquel w/ improvement. However pt refused taking medications for several days in June with significant decline in mental status and more frequent outbursts. Was hospitalized at [facility] x 3 wks in July and tried on various medications including zyprexa, depakote, lexapro. Ct head and cxr which were neg at the time. AVSS. Pt given zyprexa 5mg im. Labs unremarkable w/ no leukocytosis, electrolyte imbalance; vpa just above therapeutic range. UA neg for infection, + small ketones. CXR neg for i/e. Pt seen by . Assisted living will not take pt back to residence due to pt's difficult behavior. Pt to be seen by psych regarding recommendations for medications, possible desean-psych admit vs. medicine admit. Impression: 86 y/o hx of HLD, GERD, Dementia with behavioral disturbance, nontoxic multinodular disorder, scitica, sent from assisted living for agitation and violent behavior. Pt's daughter at bedside and stating pt has h/o similar behaviour since Feb '22, was tx'd w/ lexapro and seroquel w/ improvement. However pt refused taking medications for several days in June with significant decline in mental status and more frequent outbursts. Was hospitalized at [facility] x 3 wks in July and tried on various medications including zyprexa, depakote, lexapro. Ct head and cxr which were neg at the time. AVSS. Pt given zyprexa 5mg im. Labs unremarkable w/ no leukocytosis, electrolyte imbalance; vpa just above therapeutic range. UA neg for infection, + small ketones. CXR neg for i/e. Pt seen by . Assisted living will not take pt back to residence due to pt's difficult behavior. Pt to be seen by psych regarding recommendations for medications, possible desean-psych admit vs. medicine admit. Impression: 88 y/o hx of HLD, GERD, Dementia with behavioral disturbance, nontoxic multinodular disorder, scitica, sent from assisted living for agitation and violent behavior. Pt's daughter at bedside and stating pt has h/o similar behaviour since Feb '22, was tx'd w/ lexapro and seroquel w/ improvement. However pt refused taking medications for several days in June with significant decline in mental status and more frequent outbursts. Was hospitalized at [facility] x 3 wks in July and tried on various medications including zyprexa, depakote, lexapro. Ct head and cxr which were neg at the time. AVSS. Pt given zyprexa 5mg im. Labs unremarkable w/ no leukocytosis, electrolyte imbalance; vpa just above therapeutic range. UA neg for infection, + small ketones. CXR neg for i/e. Pt seen by . Assisted living will not take pt back to residence due to pt's difficult behavior. Pt to be seen by psych regarding recommendations for medications, possible desean-psych admit vs. medicine admit.

## 2022-08-16 ENCOUNTER — NON-APPOINTMENT (OUTPATIENT)
Age: 87
End: 2022-08-16

## 2022-08-16 DIAGNOSIS — Z71.89 OTHER SPECIFIED COUNSELING: ICD-10-CM

## 2022-08-16 DIAGNOSIS — I10 ESSENTIAL (PRIMARY) HYPERTENSION: ICD-10-CM

## 2022-08-16 DIAGNOSIS — R45.1 RESTLESSNESS AND AGITATION: ICD-10-CM

## 2022-08-16 DIAGNOSIS — Z51.5 ENCOUNTER FOR PALLIATIVE CARE: ICD-10-CM

## 2022-08-16 PROCEDURE — 99223 1ST HOSP IP/OBS HIGH 75: CPT

## 2022-08-16 PROCEDURE — 99358 PROLONG SERVICE W/O CONTACT: CPT | Mod: NC

## 2022-08-16 PROCEDURE — 99232 SBSQ HOSP IP/OBS MODERATE 35: CPT | Mod: GC

## 2022-08-16 PROCEDURE — 99232 SBSQ HOSP IP/OBS MODERATE 35: CPT

## 2022-08-16 RX ORDER — ENOXAPARIN SODIUM 100 MG/ML
40 INJECTION SUBCUTANEOUS EVERY 24 HOURS
Refills: 0 | Status: DISCONTINUED | OUTPATIENT
Start: 2022-08-16 | End: 2022-08-26

## 2022-08-16 RX ORDER — DIVALPROEX SODIUM 500 MG/1
250 TABLET, DELAYED RELEASE ORAL EVERY 12 HOURS
Refills: 0 | Status: DISCONTINUED | OUTPATIENT
Start: 2022-08-16 | End: 2022-08-16

## 2022-08-16 RX ORDER — VALPROIC ACID (AS SODIUM SALT) 250 MG/5ML
250 SOLUTION, ORAL ORAL
Refills: 0 | Status: DISCONTINUED | OUTPATIENT
Start: 2022-08-16 | End: 2022-08-19

## 2022-08-16 RX ORDER — ESCITALOPRAM OXALATE 10 MG/1
5 TABLET, FILM COATED ORAL EVERY 24 HOURS
Refills: 0 | Status: DISCONTINUED | OUTPATIENT
Start: 2022-08-16 | End: 2022-08-16

## 2022-08-16 RX ORDER — ESCITALOPRAM OXALATE 10 MG/1
5 TABLET, FILM COATED ORAL EVERY 24 HOURS
Refills: 0 | Status: DISCONTINUED | OUTPATIENT
Start: 2022-08-16 | End: 2022-08-26

## 2022-08-16 RX ORDER — HALOPERIDOL DECANOATE 100 MG/ML
1 INJECTION INTRAMUSCULAR
Refills: 0 | Status: DISCONTINUED | OUTPATIENT
Start: 2022-08-16 | End: 2022-08-17

## 2022-08-16 RX ORDER — QUETIAPINE FUMARATE 200 MG/1
50 TABLET, FILM COATED ORAL
Refills: 0 | Status: DISCONTINUED | OUTPATIENT
Start: 2022-08-16 | End: 2022-08-16

## 2022-08-16 RX ORDER — AMLODIPINE BESYLATE 2.5 MG/1
5 TABLET ORAL EVERY 24 HOURS
Refills: 0 | Status: DISCONTINUED | OUTPATIENT
Start: 2022-08-16 | End: 2022-08-26

## 2022-08-16 RX ADMIN — ENOXAPARIN SODIUM 40 MILLIGRAM(S): 100 INJECTION SUBCUTANEOUS at 11:43

## 2022-08-16 RX ADMIN — Medication 52.5 MILLIGRAM(S): at 19:44

## 2022-08-16 NOTE — CONSULT NOTE ADULT - PROBLEM SELECTOR RECOMMENDATION 6
Following for GOC.    Lauren Rondon MD  Palliative Fellow, PGY5  Geriatrics and Palliative Consult Service

## 2022-08-16 NOTE — CONSULT NOTE ADULT - ATTENDING COMMENTS
Complex medical decision making / symptom management in the setting of dementia with severe behavioral disturbances.    88yo F with dementia and severe agitation presenting from [facility]. Behavioral disturbances have been difficult to manage as outpatient. Family is aware that patient would not be appropriate to return to [facility] given the required level of care. While cognitive decline is severe, lack of physical decline precludes hospice referral as patient would not meet criteria. Ongoing GOC discussion with daughter, documented HCP, regarding next best steps.    Will continue to follow for ongoing GOC discussion / titration of palliative regimen. Emotional support provided, questions answered.  Active Psychosocial Referrals:  [x]Social Work/Case management []PT/OT []Chaplaincy []Hospice  []Patient/Family Support []Holistic RN []Massage Therapy []Ethics  Coping: [] well [] with difficulty [] poor coping [x] unable to assess  Support system: [] strong [x] adequate [] inadequate    For new or uncontrolled symptoms, please call Palliative Care at [phone]. The service is available 24/7 (including nights & weekends) to provide symptom management recommendations over the phone as appropriate Complex medical decision making / symptom management in the setting of dementia with severe behavioral disturbances.    86yo F with dementia and severe agitation presenting from [facility]. Behavioral disturbances have been difficult to manage as outpatient. Family is aware that patient would not be appropriate to return to [facility] given the required level of care. While cognitive decline is severe, lack of physical decline precludes hospice referral as patient would not meet criteria. Ongoing GOC discussion with daughter, documented HCP, regarding next best steps.    Will continue to follow for ongoing GOC discussion / titration of palliative regimen. Emotional support provided, questions answered.  Active Psychosocial Referrals:  [x]Social Work/Case management []PT/OT []Chaplaincy []Hospice  []Patient/Family Support []Holistic RN []Massage Therapy []Ethics  Coping: [] well [] with difficulty [] poor coping [x] unable to assess  Support system: [] strong [x] adequate [] inadequate    For new or uncontrolled symptoms, please call Palliative Care at [phone]. The service is available 24/7 (including nights & weekends) to provide symptom management recommendations over the phone as appropriate Complex medical decision making / symptom management in the setting of dementia with severe behavioral disturbances.    88yo F with dementia and severe agitation presenting from [facility]. Behavioral disturbances have been difficult to manage as outpatient. Family is aware that patient would not be appropriate to return to [facility] given the required level of care. While cognitive decline is severe, lack of physical decline precludes hospice referral as patient would not meet criteria. Ongoing GOC discussion with daughter, documented HCP, regarding next best steps.    Will continue to follow for ongoing GOC discussion / titration of palliative regimen. Emotional support provided, questions answered.  Active Psychosocial Referrals:  [x]Social Work/Case management []PT/OT []Chaplaincy []Hospice  []Patient/Family Support []Holistic RN []Massage Therapy []Ethics  Coping: [] well [] with difficulty [] poor coping [x] unable to assess  Support system: [] strong [x] adequate [] inadequate    For new or uncontrolled symptoms, please call Palliative Care at [phone]. The service is available 24/7 (including nights & weekends) to provide symptom management recommendations over the phone as appropriate

## 2022-08-16 NOTE — PROGRESS NOTE ADULT - PROBLEM SELECTOR PLAN 6
Patient with history of GERD, currently not on any medication.    - Can start Pepcid if symptomatic and taking pills

## 2022-08-16 NOTE — CONSULT NOTE ADULT - ASSESSMENT
88 yo F with HLD, GERD, dementia (with behavioral disturbances), sciatica sent from assisted living for agitation and violent behavior. Follows with outpatient [facility] at assisted living facility. Geriatrics and Palliative Care team consulted for GOC. 88 yo F with HLD, GERD, dementia (with behavioral disturbances), sciatica sent from assisted living for agitation and violent behavior. Follows with outpatient [facility] at assisted living facility. Geriatrics and Palliative Care team consulted for GOC. 86 yo F with HLD, GERD, dementia (with behavioral disturbances), sciatica sent from assisted living for agitation and violent behavior. Follows with outpatient [facility]iatric[facility] at assisted living facility. Geriatrics and Palliative Care team consulted for GOC.

## 2022-08-16 NOTE — PROGRESS NOTE ADULT - PROBLEM SELECTOR PLAN 8
F: none  E: replete PRN  N: regular diet pending passing dysphagia screen   GI: none  DVT: Lovenox  Code: Full  Dispo: DINA

## 2022-08-16 NOTE — PROGRESS NOTE ADULT - PROBLEM SELECTOR PLAN 3
Patient with urinary retention no admission which likely contributed to symptoms. Etiology unclear, may be in setting of detrusor underactivity vs medication side effect.   - last PVR 96    - S/c q6 hrs and if no improvement consult urology
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
## 2022-08-16 NOTE — BH CONSULTATION LIAISON PROGRESS NOTE - ORIENTATION
Able to say her name is "Cady" and knows she is in [place] otherwise does not realize she is in hospital or date. Able to say her name is "Cady" and knows she is in [place] otherwise does not realize she is in hospital or date. Able to say her name is "Cady" and knows she is in [place] otherwise does not realize she is in hospital or date.

## 2022-08-16 NOTE — CONSULT NOTE ADULT - PROBLEM SELECTOR RECOMMENDATION 3
Spoke with daughter Sridevi Barnard (also HCP). She endorses that she wants to make any and all decisions with her 2 sisters Monie and Veronica and believes that her mother would want to be DNR/DNI but will need to discuss with her family before committing to it.  - HCP: Sridevi Barnard, [phone] [facility] Spoke with daughter Sridevi Barnard (also HCP). She endorses that she wants to make any and all decisions with her 2 sisters Monie and Veronica and believes that her mother would want to be DNR/DNI but will need to discuss with her family before committing to it.  - HCP: Sridevi Barnard, [phone] (HCP form in [facility]") Spoke with daughter Sridevi Barnard (also HCP). She endorses that she wants to make any and all decisions with her 2 sisters Monie and Veronica and believes that her mother would want to be DNR/DNI but will need to discuss with her family before committing to it.  - HCP: Sridevi Barnard, [phone] [facility] PPSV = 30%, requires assistance for most ADLs  -patient would not benefit from restorative rehab services  -c/w supportive care, OOB, encourage movement

## 2022-08-16 NOTE — BH CONSULTATION LIAISON PROGRESS NOTE - NSBHATTESTBILLINGAW_PSY_A_CORE
[id number] Inpatient care - moderate complexity - 25 minutes [id number] Inpatient care - moderate complexity - 25 minutes [id number] Inpatient care - moderate complexity - 25 minutes

## 2022-08-16 NOTE — CONSULT NOTE ADULT - PROBLEM SELECTOR RECOMMENDATION 5
Spoke with daughter Sridevi Barnard (also HCP). She endorses that she wants to make any and all decisions with her 2 sisters Monie and Veronica and believes that her mother would want to be DNR/DNI but will need to discuss with her family before committing to it.  - HCP: Sridevi Barnard, [phone] [facility] Spoke with daughter Sridevi Barnard (also HCP). She endorses that she wants to make any and all decisions with her 2 sisters Monie and Veronica and believes that her mother would want to be DNR/DNI but will need to discuss with her family before committing to it.  - HCP: Sridevi Banrard, [phone] [facility] Spoke with daughter Sridevi Barnard (also HCP). She endorses that she wants to make any and all decisions with her 2 sisters Monie and Veronica and believes that her mother would want to be DNR/DNI but will need to discuss with her family before committing to it.  - HCP: Sridevi Barnard, [phone] [facility]
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
## 2022-08-16 NOTE — CONSULT NOTE ADULT - SUBJECTIVE AND OBJECTIVE BOX
HPI:  88 y/o female PMH HLD, GERD, Alzheimer's and possible frontotemporal dementia, sciatica sent from assisted living for agitation and violent behavior.  Patient was given versed 4mg IM by EMS. Unable to obtain history from patient, per daughter at bedside patient had also received seroquel at the facility.  Daughter reports patient had similar behaviour in  after which she was started on lexapro and seroquel with improvement. In  patient stopped taking her medication with significant decline in mental status and more frequent outbursts. She was hospitalized at [facility] for 3 weeks in July for agitation and discharged on her current regimen.  Patient with no other recent illness, changes in medication, sick contacts. She has not had any recent  fever, chills, dizziness, weakness, HA, Changes in vision, CP, palpitations, SOB, cough, N/V/D/C, dysuria, changes in bowel movements, LE edema. ROS otherwise negative. Per ED assisted living will not take pt back to residence due to pt's difficult behavior.     In the ED, patient's vitals initially stable then tachy to 124.  Labs significant for  and Valproic Acid level slight high at 100.5.  She was given 1L NS and Olanzapine 2.5mg x 3 for persistent agitation with subsequent concern for dystonic reaction for which given Benadryl 25mg IV x 1.  Patient was also retaining, straight cath 1L light yellow urine.   (15 Aug 2022 20:16)    PERTINENT PM/SXH:   HLD (hyperlipidemia)    GERD (gastroesophageal reflux disease)    Dementia with behavioral disturbance    Sciatica      FAMILY HISTORY:  No FH of psychosis    Family Hx substance abuse [ ]yes [ ]no  ITEMS NOT CHECKED ARE NOT PRESENT    SOCIAL HISTORY:   Significant other/partner[ ]  Children[X]  Sabianism/Spirituality:  Substance hx:  [ ]   Tobacco hx:  [ ]   Alcohol hx: [ ]   Home Opioid hx:  [ ] I-Stop Reference No:  Living Situation: [ ]Home  [X] assisted  [ ]Long term care  [ ]Rehab [ ]Other    ADVANCE DIRECTIVES:    DNR/MOLST  [ ]  Living Will  [ ]   DECISION MAKER(s):  [X] Health Care Proxy(s)  [ ] Surrogate(s)  [ ] Guardian           Name(s): Sridevi Barnard   Phone Number(s): [phone]    BASELINE (I)ADL(s) (prior to admission):  [place]: [ ]Total  [ ] Moderate [X]Dependent    Allergies    No Known Allergies    Intolerances    MEDICATIONS  (STANDING):  amLODIPine   Tablet 5 milliGRAM(s) Oral every 24 hours  diVALproex Sprinkle 250 milliGRAM(s) Oral every 12 hours  enoxaparin Injectable 40 milliGRAM(s) SubCutaneous every 24 hours  escitalopram 5 milliGRAM(s) Oral every 24 hours  QUEtiapine 50 milliGRAM(s) Oral two times a day    MEDICATIONS  (PRN):  aluminum hydroxide/magnesium hydroxide/simethicone Suspension 30 milliLiter(s) Oral every 4 hours PRN Dyspepsia  melatonin 3 milliGRAM(s) Oral at bedtime PRN Insomnia    PRESENT SYMPTOMS: [X]Unable to self-report  Source if other than patient:  [ ]Family   [X]Team     Pain: [ ]yes [X]no  QOL impact -   Location -                    Aggravating factors -  Quality -  Radiation -  Timing-  Severity (0-10 scale):  Minimal acceptable level (0-10 scale):     Dyspnea:                           [ ]Mild [ ]Moderate [ ]Severe  Anxiety:                             [ ]Mild [ ]Moderate [ ]Severe  Fatigue:                             [ ]Mild [ ]Moderate [ ]Severe  Nausea:                             [ ]Mild [ ]Moderate [ ]Severe  Loss of appetite:              [ ]Mild [ ]Moderate [ ]Severe  Constipation:                    [ ]Mild [ ]Moderate [ ]Severe    Other Symptoms:  [X]All other review of systems negative     Palliative Performance Status Version 2:        10 %    [email or web address]    PHYSICAL EXAM:  Vital Signs Last 24 Hrs  T(C): 36.6 (16 Aug 2022 09:05), Max: 36.6 (15 Aug 2022 16:00)  T(F): 97.8 (16 Aug 2022 09:05), Max: 97.8 (15 Aug 2022 16:00)  HR: 75 (16 Aug 2022 09:05) (75 - 124)  BP: 143/78 (16 Aug 2022 09:05) (143/70 - 167/75)  BP(mean): 106 (15 Aug 2022 21:52) (106 - 106)  RR: 18 (16 Aug 2022 09:05) (16 - 20)  SpO2: 97% (16 Aug 2022 09:05) (95% - 98%)    Parameters below as of 16 Aug 2022 09:05  Patient On (Oxygen Delivery Method): room air     I&O's Summary    15 Aug 2022 07:01  -  16 Aug 2022 07:00  --------------------------------------------------------  IN: 0 mL / OUT: 900 mL / NET: -900 mL      GENERAL: [ ]Cachexia    [X]Alert  [X]Oriented x0   [ ]Lethargic  [ ]Unarousable  [ ]Verbal  [ ]Non-Verbal  Behavioral:   [ ] Anxiety  [ ] Delirium [ ] Agitation [X] Visual hallucinations  HEENT:  [X]Normal   [ ]Dry mouth   [ ]ET Tube/Trach  [ ]Oral lesions  PULMONARY:   [X]Clear [ ]Tachypnea  [ ]Audible excessive secretions   [ ]Rhonchi        [ ]Right [ ]Left [ ]Bilateral  [ ]Crackles        [ ]Right [ ]Left [ ]Bilateral  [ ]Wheezing     [ ]Right [ ]Left [ ]Bilateral  [ ]Diminished breath sounds [ ]right [ ]left [ ]bilateral  CARDIOVASCULAR:    [X]Regular [ ]Irregular [ ]Tachy  [ ]Juan Pablo [ ]Murmur [ ]Other  GASTROINTESTINAL:  [X]Soft  [ ]Distended   [ ]+BS  [ ]Non tender [ ]Tender  [ ]Other [ ]PEG [ ]OGT/ NGT  Last BM:  GENITOURINARY:  [X]Normal [ ] Incontinent   [ ]Oliguria/Anuria   [ ]Daley  MUSCULOSKELETAL:   [X]Normal   [ ]Weakness  [ ]Bed/Wheelchair bound [ ]Edema  NEUROLOGIC:   [ ]No focal deficits  [X]Cognitive impairment  [ ]Dysphagia [ ]Dysarthria [ ]Paresis [ ]Other   SKIN:   [X]Normal  [ ]Rash  [ ]Other  [ ]Pressure ulcer(s)       Present on admission [ ]y [ ]n    CRITICAL CARE:  [ ] Shock Present  [ ]Septic [ ]Cardiogenic [ ]Neurologic [ ]Hypovolemic  [ ]  Vasopressors [ ]  Inotropes   [ ]Respiratory failure present [ ]Mechanical ventilation [ ]Non-invasive ventilatory support [ ]High flow    [ ]Acute  [ ]Chronic [ ]Hypoxic  [ ]Hypercarbic [ ]Other  [ ]Other organ failure     LABS:                        14.8   5.70  )-----------( 176      ( 15 Aug 2022 14:19 )             45.3   08-15    142  |  102  |  15  ----------------------------<  110<H>  4.0   |  29  |  0.86    Ca    9.5      15 Aug 2022 14:19    TPro  7.4  /  Alb  4.3  /  TBili  0.5  /  DBili  x   /  AST  30  /  ALT  14  /  AlkPhos  54  08-15      Urinalysis Basic - ( 15 Aug 2022 22:03 )    Color: Yellow / Appearance: Clear / S.015 / pH: x  Gluc: x / Ketone: 15 mg/dL  / Bili: Negative / Urobili: 0.2 E.U./dL   Blood: x / Protein: NEGATIVE mg/dL / Nitrite: NEGATIVE   Leuk Esterase: NEGATIVE / RBC: 5-10 /HPF / WBC < 5 /HPF   Sq Epi: x / Non Sq Epi: 0-5 /HPF / Bacteria: Present /HPF      RADIOLOGY & ADDITIONAL STUDIES:  < from: Xray Chest 1 View AP/PA (08.15.22 @ 15:42) >  IMPRESSION: No acute cardiopulmonary disease process.    < end of copied text >      [ ]PPSV2 < or = to 30% [ ]significant weight loss  [ ]poor nutritional intake  [ ]anasarca[ ]Artificial Nutrition      REFERRALS:   [ ]Chaplaincy  [ ]Hospice  [ ]Child Life  [ ]Social Work  [X]Case management [ ]Holistic Therapy  HPI:  88 y/o female PMH HLD, GERD, Alzheimer's and possible frontotemporal dementia, sciatica sent from assisted living for agitation and violent behavior.  Patient was given versed 4mg IM by EMS. Unable to obtain history from patient, per daughter at bedside patient had also received seroquel at the facility.  Daughter reports patient had similar behaviour in  after which she was started on lexapro and seroquel with improvement. In  patient stopped taking her medication with significant decline in mental status and more frequent outbursts. She was hospitalized at [facility] for 3 weeks in July for agitation and discharged on her current regimen.  Patient with no other recent illness, changes in medication, sick contacts. She has not had any recent  fever, chills, dizziness, weakness, HA, Changes in vision, CP, palpitations, SOB, cough, N/V/D/C, dysuria, changes in bowel movements, LE edema. ROS otherwise negative. Per ED assisted living will not take pt back to residence due to pt's difficult behavior.     In the ED, patient's vitals initially stable then tachy to 124.  Labs significant for  and Valproic Acid level slight high at 100.5.  She was given 1L NS and Olanzapine 2.5mg x 3 for persistent agitation with subsequent concern for dystonic reaction for which given Benadryl 25mg IV x 1.  Patient was also retaining, straight cath 1L light yellow urine.   (15 Aug 2022 20:16)    PERTINENT PM/SXH:   HLD (hyperlipidemia)    GERD (gastroesophageal reflux disease)    Dementia with behavioral disturbance    Sciatica      FAMILY HISTORY:  No FH of psychosis    Family Hx substance abuse [ ]yes [ ]no  ITEMS NOT CHECKED ARE NOT PRESENT    SOCIAL HISTORY:   Significant other/partner[ ]  Children[X]  Buddhist/Spirituality:  Substance hx:  [ ]   Tobacco hx:  [ ]   Alcohol hx: [ ]   Home Opioid hx:  [ ] I-Stop Reference No:  Living Situation: [ ]Home  [X] long-term  [ ]Long term care  [ ]Rehab [ ]Other    ADVANCE DIRECTIVES:    DNR/MOLST  [ ]  Living Will  [ ]   DECISION MAKER(s):  [X] Health Care Proxy(s)  [ ] Surrogate(s)  [ ] Guardian           Name(s): Sridevi Barnard   Phone Number(s): [phone]    BASELINE (I)ADL(s) (prior to admission):  [place]: [ ]Total  [ ] Moderate [X]Dependent    Allergies    No Known Allergies    Intolerances    MEDICATIONS  (STANDING):  amLODIPine   Tablet 5 milliGRAM(s) Oral every 24 hours  diVALproex Sprinkle 250 milliGRAM(s) Oral every 12 hours  enoxaparin Injectable 40 milliGRAM(s) SubCutaneous every 24 hours  escitalopram 5 milliGRAM(s) Oral every 24 hours  QUEtiapine 50 milliGRAM(s) Oral two times a day    MEDICATIONS  (PRN):  aluminum hydroxide/magnesium hydroxide/simethicone Suspension 30 milliLiter(s) Oral every 4 hours PRN Dyspepsia  melatonin 3 milliGRAM(s) Oral at bedtime PRN Insomnia    PRESENT SYMPTOMS: [X]Unable to self-report  Source if other than patient:  [ ]Family   [X]Team     Pain: [ ]yes [X]no  QOL impact -   Location -                    Aggravating factors -  Quality -  Radiation -  Timing-  Severity (0-10 scale):  Minimal acceptable level (0-10 scale):     Dyspnea:                           [ ]Mild [ ]Moderate [ ]Severe  Anxiety:                             [ ]Mild [ ]Moderate [ ]Severe  Fatigue:                             [ ]Mild [ ]Moderate [ ]Severe  Nausea:                             [ ]Mild [ ]Moderate [ ]Severe  Loss of appetite:              [ ]Mild [ ]Moderate [ ]Severe  Constipation:                    [ ]Mild [ ]Moderate [ ]Severe    Other Symptoms:  [X]All other review of systems negative     Palliative Performance Status Version 2:        10 %    [email or web address]    PHYSICAL EXAM:  Vital Signs Last 24 Hrs  T(C): 36.6 (16 Aug 2022 09:05), Max: 36.6 (15 Aug 2022 16:00)  T(F): 97.8 (16 Aug 2022 09:05), Max: 97.8 (15 Aug 2022 16:00)  HR: 75 (16 Aug 2022 09:05) (75 - 124)  BP: 143/78 (16 Aug 2022 09:05) (143/70 - 167/75)  BP(mean): 106 (15 Aug 2022 21:52) (106 - 106)  RR: 18 (16 Aug 2022 09:05) (16 - 20)  SpO2: 97% (16 Aug 2022 09:05) (95% - 98%)    Parameters below as of 16 Aug 2022 09:05  Patient On (Oxygen Delivery Method): room air     I&O's Summary    15 Aug 2022 07:01  -  16 Aug 2022 07:00  --------------------------------------------------------  IN: 0 mL / OUT: 900 mL / NET: -900 mL      GENERAL: [ ]Cachexia    [X]Alert  [X]Oriented x0   [ ]Lethargic  [ ]Unarousable  [ ]Verbal  [ ]Non-Verbal  Behavioral:   [ ] Anxiety  [ ] Delirium [ ] Agitation [X] Visual hallucinations  HEENT:  [X]Normal   [ ]Dry mouth   [ ]ET Tube/Trach  [ ]Oral lesions  PULMONARY:   [X]Clear [ ]Tachypnea  [ ]Audible excessive secretions   [ ]Rhonchi        [ ]Right [ ]Left [ ]Bilateral  [ ]Crackles        [ ]Right [ ]Left [ ]Bilateral  [ ]Wheezing     [ ]Right [ ]Left [ ]Bilateral  [ ]Diminished breath sounds [ ]right [ ]left [ ]bilateral  CARDIOVASCULAR:    [X]Regular [ ]Irregular [ ]Tachy  [ ]Juan Pablo [ ]Murmur [ ]Other  GASTROINTESTINAL:  [X]Soft  [ ]Distended   [ ]+BS  [ ]Non tender [ ]Tender  [ ]Other [ ]PEG [ ]OGT/ NGT  Last BM:  GENITOURINARY:  [X]Normal [ ] Incontinent   [ ]Oliguria/Anuria   [ ]Daley  MUSCULOSKELETAL:   [X]Normal   [ ]Weakness  [ ]Bed/Wheelchair bound [ ]Edema  NEUROLOGIC:   [ ]No focal deficits  [X]Cognitive impairment  [ ]Dysphagia [ ]Dysarthria [ ]Paresis [ ]Other   SKIN:   [X]Normal  [ ]Rash  [ ]Other  [ ]Pressure ulcer(s)       Present on admission [ ]y [ ]n    CRITICAL CARE:  [ ] Shock Present  [ ]Septic [ ]Cardiogenic [ ]Neurologic [ ]Hypovolemic  [ ]  Vasopressors [ ]  Inotropes   [ ]Respiratory failure present [ ]Mechanical ventilation [ ]Non-invasive ventilatory support [ ]High flow    [ ]Acute  [ ]Chronic [ ]Hypoxic  [ ]Hypercarbic [ ]Other  [ ]Other organ failure     LABS:                        14.8   5.70  )-----------( 176      ( 15 Aug 2022 14:19 )             45.3   08-15    142  |  102  |  15  ----------------------------<  110<H>  4.0   |  29  |  0.86    Ca    9.5      15 Aug 2022 14:19    TPro  7.4  /  Alb  4.3  /  TBili  0.5  /  DBili  x   /  AST  30  /  ALT  14  /  AlkPhos  54  08-15      Urinalysis Basic - ( 15 Aug 2022 22:03 )    Color: Yellow / Appearance: Clear / S.015 / pH: x  Gluc: x / Ketone: 15 mg/dL  / Bili: Negative / Urobili: 0.2 E.U./dL   Blood: x / Protein: NEGATIVE mg/dL / Nitrite: NEGATIVE   Leuk Esterase: NEGATIVE / RBC: 5-10 /HPF / WBC < 5 /HPF   Sq Epi: x / Non Sq Epi: 0-5 /HPF / Bacteria: Present /HPF      RADIOLOGY & ADDITIONAL STUDIES:  < from: Xray Chest 1 View AP/PA (08.15.22 @ 15:42) >  IMPRESSION: No acute cardiopulmonary disease process.    < end of copied text >      [ ]PPSV2 < or = to 30% [ ]significant weight loss  [ ]poor nutritional intake  [ ]anasarca[ ]Artificial Nutrition      REFERRALS:   [ ]Chaplaincy  [ ]Hospice  [ ]Child Life  [ ]Social Work  [X]Case management [ ]Holistic Therapy  HPI:  86 y/o female PMH HLD, GERD, Alzheimer's and possible frontotemporal dementia, sciatica sent from assisted living for agitation and violent behavior.  Patient was given versed 4mg IM by EMS. Unable to obtain history from patient, per daughter at bedside patient had also received seroquel at the facility.  Daughter reports patient had similar behaviour in  after which she was started on lexapro and seroquel with improvement. In  patient stopped taking her medication with significant decline in mental status and more frequent outbursts. She was hospitalized at [facility] for 3 weeks in July for agitation and discharged on her current regimen.  Patient with no other recent illness, changes in medication, sick contacts. She has not had any recent  fever, chills, dizziness, weakness, HA, Changes in vision, CP, palpitations, SOB, cough, N/V/D/C, dysuria, changes in bowel movements, LE edema. ROS otherwise negative. Per ED assisted living will not take pt back to residence due to pt's difficult behavior.     In the ED, patient's vitals initially stable then tachy to 124.  Labs significant for  and Valproic Acid level slight high at 100.5.  She was given 1L NS and Olanzapine 2.5mg x 3 for persistent agitation with subsequent concern for dystonic reaction for which given Benadryl 25mg IV x 1.  Patient was also retaining, straight cath 1L light yellow urine.   (15 Aug 2022 20:16)    PERTINENT PM/SXH:   HLD (hyperlipidemia)    GERD (gastroesophageal reflux disease)    Dementia with behavioral disturbance    Sciatica      FAMILY HISTORY:  No FH of psychosis    Family Hx substance abuse [ ]yes [ ]no  ITEMS NOT CHECKED ARE NOT PRESENT    SOCIAL HISTORY:   Significant other/partner[ ]  Children[X]  Anabaptism/Spirituality:  Substance hx:  [ ]   Tobacco hx:  [ ]   Alcohol hx: [ ]   Home Opioid hx:  [ ] I-Stop Reference No:  Living Situation: [ ]Home  [X] intermediate  [ ]Long term care  [ ]Rehab [ ]Other    ADVANCE DIRECTIVES:    DNR/MOLST  [ ]  Living Will  [ ]   DECISION MAKER(s):  [X] Health Care Proxy(s)  [ ] Surrogate(s)  [ ] Guardian           Name(s): Sridevi Barnard   Phone Number(s): [phone]    BASELINE (I)ADL(s) (prior to admission):  [place]: [ ]Total  [ ] Moderate [X]Dependent    Allergies    No Known Allergies    Intolerances    MEDICATIONS  (STANDING):  amLODIPine   Tablet 5 milliGRAM(s) Oral every 24 hours  diVALproex Sprinkle 250 milliGRAM(s) Oral every 12 hours  enoxaparin Injectable 40 milliGRAM(s) SubCutaneous every 24 hours  escitalopram 5 milliGRAM(s) Oral every 24 hours  QUEtiapine 50 milliGRAM(s) Oral two times a day    MEDICATIONS  (PRN):  aluminum hydroxide/magnesium hydroxide/simethicone Suspension 30 milliLiter(s) Oral every 4 hours PRN Dyspepsia  melatonin 3 milliGRAM(s) Oral at bedtime PRN Insomnia    PRESENT SYMPTOMS: [X]Unable to self-report  Source if other than patient:  [ ]Family   [X]Team     Pain: [ ]yes [X]no  QOL impact -   Location -                    Aggravating factors -  Quality -  Radiation -  Timing-  Severity (0-10 scale):  Minimal acceptable level (0-10 scale):     Dyspnea:                           [ ]Mild [ ]Moderate [ ]Severe  Anxiety:                             [ ]Mild [ ]Moderate [ ]Severe  Fatigue:                             [ ]Mild [ ]Moderate [ ]Severe  Nausea:                             [ ]Mild [ ]Moderate [ ]Severe  Loss of appetite:              [ ]Mild [ ]Moderate [ ]Severe  Constipation:                    [ ]Mild [ ]Moderate [ ]Severe    Other Symptoms:  [X]All other review of systems negative     Palliative Performance Status Version 2:        10 %    [email or web address]    PHYSICAL EXAM:  Vital Signs Last 24 Hrs  T(C): 36.6 (16 Aug 2022 09:05), Max: 36.6 (15 Aug 2022 16:00)  T(F): 97.8 (16 Aug 2022 09:05), Max: 97.8 (15 Aug 2022 16:00)  HR: 75 (16 Aug 2022 09:05) (75 - 124)  BP: 143/78 (16 Aug 2022 09:05) (143/70 - 167/75)  BP(mean): 106 (15 Aug 2022 21:52) (106 - 106)  RR: 18 (16 Aug 2022 09:05) (16 - 20)  SpO2: 97% (16 Aug 2022 09:05) (95% - 98%)    Parameters below as of 16 Aug 2022 09:05  Patient On (Oxygen Delivery Method): room air     I&O's Summary    15 Aug 2022 07:01  -  16 Aug 2022 07:00  --------------------------------------------------------  IN: 0 mL / OUT: 900 mL / NET: -900 mL      GENERAL: [ ]Cachexia    [X]Alert  [X]Oriented x0   [ ]Lethargic  [ ]Unarousable  [ ]Verbal  [ ]Non-Verbal  Behavioral:   [ ] Anxiety  [ ] Delirium [ ] Agitation [X] Visual hallucinations  HEENT:  [X]Normal   [ ]Dry mouth   [ ]ET Tube/Trach  [ ]Oral lesions  PULMONARY:   [X]Clear [ ]Tachypnea  [ ]Audible excessive secretions   [ ]Rhonchi        [ ]Right [ ]Left [ ]Bilateral  [ ]Crackles        [ ]Right [ ]Left [ ]Bilateral  [ ]Wheezing     [ ]Right [ ]Left [ ]Bilateral  [ ]Diminished breath sounds [ ]right [ ]left [ ]bilateral  CARDIOVASCULAR:    [X]Regular [ ]Irregular [ ]Tachy  [ ]Juan Pablo [ ]Murmur [ ]Other  GASTROINTESTINAL:  [X]Soft  [ ]Distended   [ ]+BS  [ ]Non tender [ ]Tender  [ ]Other [ ]PEG [ ]OGT/ NGT  Last BM:  GENITOURINARY:  [X]Normal [ ] Incontinent   [ ]Oliguria/Anuria   [ ]Daley  MUSCULOSKELETAL:   [X]Normal   [ ]Weakness  [ ]Bed/Wheelchair bound [ ]Edema  NEUROLOGIC:   [ ]No focal deficits  [X]Cognitive impairment  [ ]Dysphagia [ ]Dysarthria [ ]Paresis [ ]Other   SKIN:   [X]Normal  [ ]Rash  [ ]Other  [ ]Pressure ulcer(s)       Present on admission [ ]y [ ]n    CRITICAL CARE:  [ ] Shock Present  [ ]Septic [ ]Cardiogenic [ ]Neurologic [ ]Hypovolemic  [ ]  Vasopressors [ ]  Inotropes   [ ]Respiratory failure present [ ]Mechanical ventilation [ ]Non-invasive ventilatory support [ ]High flow    [ ]Acute  [ ]Chronic [ ]Hypoxic  [ ]Hypercarbic [ ]Other  [ ]Other organ failure     LABS:                        14.8   5.70  )-----------( 176      ( 15 Aug 2022 14:19 )             45.3   08-15    142  |  102  |  15  ----------------------------<  110<H>  4.0   |  29  |  0.86    Ca    9.5      15 Aug 2022 14:19    TPro  7.4  /  Alb  4.3  /  TBili  0.5  /  DBili  x   /  AST  30  /  ALT  14  /  AlkPhos  54  08-15      Urinalysis Basic - ( 15 Aug 2022 22:03 )    Color: Yellow / Appearance: Clear / S.015 / pH: x  Gluc: x / Ketone: 15 mg/dL  / Bili: Negative / Urobili: 0.2 E.U./dL   Blood: x / Protein: NEGATIVE mg/dL / Nitrite: NEGATIVE   Leuk Esterase: NEGATIVE / RBC: 5-10 /HPF / WBC < 5 /HPF   Sq Epi: x / Non Sq Epi: 0-5 /HPF / Bacteria: Present /HPF      RADIOLOGY & ADDITIONAL STUDIES:  < from: Xray Chest 1 View AP/PA (08.15.22 @ 15:42) >  IMPRESSION: No acute cardiopulmonary disease process.    < end of copied text >      [ ]PPSV2 < or = to 30% [ ]significant weight loss  [ ]poor nutritional intake  [ ]anasarca[ ]Artificial Nutrition      REFERRALS:   [ ]Chaplaincy  [ ]Hospice  [ ]Child Life  [ ]Social Work  [X]Case management [ ]Holistic Therapy  HPI:  88 y/o female PMH HLD, GERD, Alzheimer's and possible frontotemporal dementia, sciatica sent from assisted living for agitation and violent behavior.  Patient was given versed 4mg IM by EMS. Unable to obtain history from patient, per daughter at bedside patient had also received seroquel at the facility.  Daughter reports patient had similar behaviour in  after which she was started on lexapro and seroquel with improvement. In  patient stopped taking her medication with significant decline in mental status and more frequent outbursts. She was hospitalized at [facility] for 3 weeks in July for agitation and discharged on her current regimen.  Patient with no other recent illness, changes in medication, sick contacts. She has not had any recent  fever, chills, dizziness, weakness, HA, Changes in vision, CP, palpitations, SOB, cough, N/V/D/C, dysuria, changes in bowel movements, LE edema. ROS otherwise negative. Per ED assisted living will not take pt back to residence due to pt's difficult behavior.     In the ED, patient's vitals initially stable then tachy to 124.  Labs significant for  and Valproic Acid level slight high at 100.5.  She was given 1L NS and Olanzapine 2.5mg x 3 for persistent agitation with subsequent concern for dystonic reaction for which given Benadryl 25mg IV x 1.  Patient was also retaining, straight cath 1L light yellow urine.   (15 Aug 2022 20:16)    Patient seen and examined at bedside. Unable to reliably participate in interview. Comprehensive symptom assessment and GOC exploration as noted below. Further collateral obtained from primary team, chart, and family.    PERTINENT PM/SXH:   HLD (hyperlipidemia)  GERD (gastroesophageal reflux disease)  Dementia with behavioral disturbance  Sciatica    FAMILY HISTORY:  No FH of psychosis    ITEMS NOT CHECKED ARE NOT PRESENT    SOCIAL HISTORY:   Significant other/partner[ ]  Children[X]  Muslim/Spirituality:  Substance hx:  [ ]   Tobacco hx:  [ ]   Alcohol hx: [ ]   Home Opioid hx:  [ ] I-Stop Reference No:  Living Situation: [ ]Home  [X] [facility]  [ ]Long term care  [ ]Rehab [ ]Other    ADVANCE DIRECTIVES:    DNR/MOLST  [ ]  Living Will  [ ]   DECISION MAKER(s):  [X] Health Care Proxy(s)  [ ] Surrogate(s)  [ ] Guardian           Name(s): Sridevi Barnard   Phone Number(s): [phone]    BASELINE (I)ADL(s) (prior to admission):  [place]: [ ]Total  [ ] Moderate [X]Dependent    Allergies  No Known Allergies    MEDICATIONS  (STANDING):  amLODIPine   Tablet 5 milliGRAM(s) Oral every 24 hours  diVALproex Sprinkle 250 milliGRAM(s) Oral every 12 hours  enoxaparin Injectable 40 milliGRAM(s) SubCutaneous every 24 hours  escitalopram 5 milliGRAM(s) Oral every 24 hours  QUEtiapine 50 milliGRAM(s) Oral two times a day    MEDICATIONS  (PRN):  aluminum hydroxide/magnesium hydroxide/simethicone Suspension 30 milliLiter(s) Oral every 4 hours PRN Dyspepsia  melatonin 3 milliGRAM(s) Oral at bedtime PRN Insomnia    PRESENT SYMPTOMS: [X]Unable to self-report  Source if other than patient:  [ ]Family   [X]Team     Pain: [ ]yes [X]no  QOL impact -   Location -                    Aggravating factors -  Quality -  Radiation -  Timing-  Severity (0-10 scale):  Minimal acceptable level (0-10 scale):     Dyspnea:                           [ ]Mild [ ]Moderate [ ]Severe  Anxiety:                             [ ]Mild [ ]Moderate [ ]Severe  Fatigue:                             [ ]Mild [ ]Moderate [ ]Severe  Nausea:                             [ ]Mild [ ]Moderate [ ]Severe  Loss of appetite:              [ ]Mild [ ]Moderate [ ]Severe  Constipation:                    [ ]Mild [ ]Moderate [ ]Severe    Other Symptoms:  [X]All other review of systems negative     Palliative Performance Status Version 2:        30 %    http://npcrc.org/files/news/palliative_performance_scale_[email or web address].pdf    PHYSICAL EXAM:  Vital Signs Last 24 Hrs  T(C): 36.6 (16 Aug 2022 09:05), Max: 36.6 (15 Aug 2022 16:00)  T(F): 97.8 (16 Aug 2022 09:05), Max: 97.8 (15 Aug 2022 16:00)  HR: 75 (16 Aug 2022 09:05) (75 - 124)  BP: 143/78 (16 Aug 2022 09:05) (143/70 - 167/75)  BP(mean): 106 (15 Aug 2022 21:52) (106 - 106)  RR: 18 (16 Aug 2022 09:05) (16 - 20)  SpO2: 97% (16 Aug 2022 09:05) (95% - 98%)    Parameters below as of 16 Aug 2022 09:05  Patient On (Oxygen Delivery Method): room air    GENERAL: [ ]Cachexia    [X]Alert  [X]Oriented x0   [ ]Lethargic  [ ]Unarousable  [ ]Verbal  [ ]Non-Verbal  Behavioral:   [ ] Anxiety  [X] Delirium [ ] Agitation [X] Visual hallucinations  HEENT:  [X]Normal   [ ]Dry mouth   [ ]ET Tube/Trach  [ ]Oral lesions  PULMONARY:   [X]Clear [ ]Tachypnea  [ ]Audible excessive secretions   [ ]Rhonchi        [ ]Right [ ]Left [ ]Bilateral  [ ]Crackles        [ ]Right [ ]Left [ ]Bilateral  [ ]Wheezing     [ ]Right [ ]Left [ ]Bilateral  [ ]Diminished breath sounds [ ]right [ ]left [ ]bilateral  CARDIOVASCULAR:    [X]Regular [ ]Irregular [ ]Tachy  [ ]Juan Pablo [ ]Murmur [ ]Other  GASTROINTESTINAL:  [X]Soft  [ ]Distended   [ ]+BS  [ ]Non tender [ ]Tender  [ ]Other [ ]PEG [ ]OGT/ NGT  Last BM:  GENITOURINARY:  [X]Normal [ ] Incontinent   [ ]Oliguria/Anuria   [ ]Daley  MUSCULOSKELETAL:   [X]Normal   [ ]Weakness  [ ]Bed/Wheelchair bound [ ]Edema  NEUROLOGIC:   [ ]No focal deficits  [X]Cognitive impairment  [ ]Dysphagia [ ]Dysarthria [ ]Paresis [ ]Other   SKIN:   [X]Normal  [ ]Rash  [ ]Other  [ ]Pressure ulcer(s)       Present on admission [ ]y [ ]n    LABS:                        14.8   5.70  )-----------( 176      ( 15 Aug 2022 14:19 )             45.3   08-15    142  |  102  |  15  ----------------------------<  110<H>  4.0   |  29  |  0.86    Ca    9.5      15 Aug 2022 14:19  TPro  7.4  /  Alb  4.3  /  TBili  0.5  /  DBili  x   /  AST  30  /  ALT  14  /  AlkPhos  54  08-15      Urinalysis Basic - ( 15 Aug 2022 22:03 )  Color: Yellow / Appearance: Clear / S.015 / pH: x  Gluc: x / Ketone: 15 mg/dL  / Bili: Negative / Urobili: 0.2 E.U./dL   Blood: x / Protein: NEGATIVE mg/dL / Nitrite: NEGATIVE   Leuk Esterase: NEGATIVE / RBC: 5-10 /HPF / WBC < 5 /HPF   Sq Epi: x / Non Sq Epi: 0-5 /HPF / Bacteria: Present /HPF    RADIOLOGY & ADDITIONAL STUDIES:  < from: Xray Chest 1 View AP/PA (08.15.22 @ 15:42) >  IMPRESSION: No acute cardiopulmonary disease process.    REFERRALS:   [ ]Chaplaincy  [ ]Hospice  [ ]Child Life  [ ]Social Work  [X]Case management [ ]Holistic Therapy     DISCUSSION OF CASE: Daughter - to provide updates and emotional support, Outpatient PCP - to discuss prior treatment and care HPI:  88 y/o female PMH HLD, GERD, Alzheimer's and possible frontotemporal dementia, sciatica sent from assisted living for agitation and violent behavior.  Patient was given versed 4mg IM by EMS. Unable to obtain history from patient, per daughter at bedside patient had also received seroquel at the facility.  Daughter reports patient had similar behaviour in  after which she was started on lexapro and seroquel with improvement. In  patient stopped taking her medication with significant decline in mental status and more frequent outbursts. She was hospitalized at [facility] for 3 weeks in July for agitation and discharged on her current regimen.  Patient with no other recent illness, changes in medication, sick contacts. She has not had any recent  fever, chills, dizziness, weakness, HA, Changes in vision, CP, palpitations, SOB, cough, N/V/D/C, dysuria, changes in bowel movements, LE edema. ROS otherwise negative. Per ED assisted living will not take pt back to residence due to pt's difficult behavior.     In the ED, patient's vitals initially stable then tachy to 124.  Labs significant for  and Valproic Acid level slight high at 100.5.  She was given 1L NS and Olanzapine 2.5mg x 3 for persistent agitation with subsequent concern for dystonic reaction for which given Benadryl 25mg IV x 1.  Patient was also retaining, straight cath 1L light yellow urine.   (15 Aug 2022 20:16)    Patient seen and examined at bedside. Unable to reliably participate in interview. Comprehensive symptom assessment and GOC exploration as noted below. Further collateral obtained from primary team, chart, and family.    PERTINENT PM/SXH:   HLD (hyperlipidemia)  GERD (gastroesophageal reflux disease)  Dementia with behavioral disturbance  Sciatica    FAMILY HISTORY:  No FH of psychosis    ITEMS NOT CHECKED ARE NOT PRESENT    SOCIAL HISTORY:   Significant other/partner[ ]  Children[X]  Faith/Spirituality:  Substance hx:  [ ]   Tobacco hx:  [ ]   Alcohol hx: [ ]   Home Opioid hx:  [ ] I-Stop Reference No:  Living Situation: [ ]Home  [X] [facility]  [ ]Long term care  [ ]Rehab [ ]Other    ADVANCE DIRECTIVES:    DNR/MOLST  [ ]  Living Will  [ ]   DECISION MAKER(s):  [X] Health Care Proxy(s)  [ ] Surrogate(s)  [ ] Guardian           Name(s): Sridevi Barnard   Phone Number(s): [phone]    BASELINE (I)ADL(s) (prior to admission):  [place]: [ ]Total  [ ] Moderate [X]Dependent    Allergies  No Known Allergies    MEDICATIONS  (STANDING):  amLODIPine   Tablet 5 milliGRAM(s) Oral every 24 hours  diVALproex Sprinkle 250 milliGRAM(s) Oral every 12 hours  enoxaparin Injectable 40 milliGRAM(s) SubCutaneous every 24 hours  escitalopram 5 milliGRAM(s) Oral every 24 hours  QUEtiapine 50 milliGRAM(s) Oral two times a day    MEDICATIONS  (PRN):  aluminum hydroxide/magnesium hydroxide/simethicone Suspension 30 milliLiter(s) Oral every 4 hours PRN Dyspepsia  melatonin 3 milliGRAM(s) Oral at bedtime PRN Insomnia    PRESENT SYMPTOMS: [X]Unable to self-report  Source if other than patient:  [ ]Family   [X]Team     Pain: [ ]yes [X]no  QOL impact -   Location -                    Aggravating factors -  Quality -  Radiation -  Timing-  Severity (0-10 scale):  Minimal acceptable level (0-10 scale):     Dyspnea:                           [ ]Mild [ ]Moderate [ ]Severe  Anxiety:                             [ ]Mild [ ]Moderate [ ]Severe  Fatigue:                             [ ]Mild [ ]Moderate [ ]Severe  Nausea:                             [ ]Mild [ ]Moderate [ ]Severe  Loss of appetite:              [ ]Mild [ ]Moderate [ ]Severe  Constipation:                    [ ]Mild [ ]Moderate [ ]Severe    Other Symptoms:  [X]All other review of systems negative     Palliative Performance Status Version 2:        30 %    http://npcrc.org/files/news/palliative_performance_scale_ppsv2.pdf    PHYSICAL EXAM:  Vital Signs Last 24 Hrs  T(C): 36.6 (16 Aug 2022 09:05), Max: 36.6 (15 Aug 2022 16:00)  T(F): 97.8 (16 Aug 2022 09:05), Max: 97.8 (15 Aug 2022 16:00)  HR: 75 (16 Aug 2022 09:05) (75 - 124)  BP: 143/78 (16 Aug 2022 09:05) (143/70 - 167/75)  BP(mean): 106 (15 Aug 2022 21:52) (106 - 106)  RR: 18 (16 Aug 2022 09:05) (16 - 20)  SpO2: 97% (16 Aug 2022 09:05) (95% - 98%)    Parameters below as of 16 Aug 2022 09:05  Patient On (Oxygen Delivery Method): room air    GENERAL: [ ]Cachexia    [X]Alert  [X]Oriented x0   [ ]Lethargic  [ ]Unarousable  [ ]Verbal  [ ]Non-Verbal  Behavioral:   [ ] Anxiety  [X] Delirium [ ] Agitation [X] Visual hallucinations  HEENT:  [X]Normal   [ ]Dry mouth   [ ]ET Tube/Trach  [ ]Oral lesions  PULMONARY:   [X]Clear [ ]Tachypnea  [ ]Audible excessive secretions   [ ]Rhonchi        [ ]Right [ ]Left [ ]Bilateral  [ ]Crackles        [ ]Right [ ]Left [ ]Bilateral  [ ]Wheezing     [ ]Right [ ]Left [ ]Bilateral  [ ]Diminished breath sounds [ ]right [ ]left [ ]bilateral  CARDIOVASCULAR:    [X]Regular [ ]Irregular [ ]Tachy  [ ]Juan Pablo [ ]Murmur [ ]Other  GASTROINTESTINAL:  [X]Soft  [ ]Distended   [ ]+BS  [ ]Non tender [ ]Tender  [ ]Other [ ]PEG [ ]OGT/ NGT  Last BM:  GENITOURINARY:  [X]Normal [ ] Incontinent   [ ]Oliguria/Anuria   [ ]Daley  MUSCULOSKELETAL:   [X]Normal   [ ]Weakness  [ ]Bed/Wheelchair bound [ ]Edema  NEUROLOGIC:   [ ]No focal deficits  [X]Cognitive impairment  [ ]Dysphagia [ ]Dysarthria [ ]Paresis [ ]Other   SKIN:   [X]Normal  [ ]Rash  [ ]Other  [ ]Pressure ulcer(s)       Present on admission [ ]y [ ]n    LABS:                        14.8   5.70  )-----------( 176      ( 15 Aug 2022 14:19 )             45.3   08-15    142  |  102  |  15  ----------------------------<  110<H>  4.0   |  29  |  0.86    Ca    9.5      15 Aug 2022 14:19  TPro  7.4  /  Alb  4.3  /  TBili  0.5  /  DBili  x   /  AST  30  /  ALT  14  /  AlkPhos  54  08-15      Urinalysis Basic - ( 15 Aug 2022 22:03 )  Color: Yellow / Appearance: Clear / S.015 / pH: x  Gluc: x / Ketone: 15 mg/dL  / Bili: Negative / Urobili: 0.2 E.U./dL   Blood: x / Protein: NEGATIVE mg/dL / Nitrite: NEGATIVE   Leuk Esterase: NEGATIVE / RBC: 5-10 /HPF / WBC < 5 /HPF   Sq Epi: x / Non Sq Epi: 0-5 /HPF / Bacteria: Present /HPF    RADIOLOGY & ADDITIONAL STUDIES:  < from: Xray Chest 1 View AP/PA (08.15.22 @ 15:42) >  IMPRESSION: No acute cardiopulmonary disease process.    REFERRALS:   [ ]Chaplaincy  [ ]Hospice  [ ]Child Life  [ ]Social Work  [X]Case management [ ]Holistic Therapy     DISCUSSION OF CASE: Daughter - to provide updates and emotional support, Outpatient PCP - to discuss prior treatment and care HPI:  88 y/o female PMH HLD, GERD, Alzheimer's and possible frontotemporal dementia, sciatica sent from assisted living for agitation and violent behavior.  Patient was given versed 4mg IM by EMS. Unable to obtain history from patient, per daughter at bedside patient had also received seroquel at the facility.  Daughter reports patient had similar behaviour in  after which she was started on lexapro and seroquel with improvement. In  patient stopped taking her medication with significant decline in mental status and more frequent outbursts. She was hospitalized at [facility] for 3 weeks in July for agitation and discharged on her current regimen.  Patient with no other recent illness, changes in medication, sick contacts. She has not had any recent  fever, chills, dizziness, weakness, HA, Changes in vision, CP, palpitations, SOB, cough, N/V/D/C, dysuria, changes in bowel movements, LE edema. ROS otherwise negative. Per ED assisted living will not take pt back to residence due to pt's difficult behavior.     In the ED, patient's vitals initially stable then tachy to 124.  Labs significant for  and Valproic Acid level slight high at 100.5.  She was given 1L NS and Olanzapine 2.5mg x 3 for persistent agitation with subsequent concern for dystonic reaction for which given Benadryl 25mg IV x 1.  Patient was also retaining, straight cath 1L light yellow urine.   (15 Aug 2022 20:16)    Patient seen and examined at bedside. Unable to reliably participate in interview. Comprehensive symptom assessment and GOC exploration as noted below. Further collateral obtained from primary team, chart, and family.    PERTINENT PM/SXH:   HLD (hyperlipidemia)  GERD (gastroesophageal reflux disease)  Dementia with behavioral disturbance  Sciatica    FAMILY HISTORY:  No FH of psychosis    ITEMS NOT CHECKED ARE NOT PRESENT    SOCIAL HISTORY:   Significant other/partner[ ]  Children[X]  Synagogue/Spirituality:  Substance hx:  [ ]   Tobacco hx:  [ ]   Alcohol hx: [ ]   Home Opioid hx:  [ ] I-Stop Reference No:  Living Situation: [ ]Home  [X] [facility]  [ ]Long term care  [ ]Rehab [ ]Other    ADVANCE DIRECTIVES:    DNR/MOLST  [ ]  Living Will  [ ]   DECISION MAKER(s):  [X] Health Care Proxy(s)  [ ] Surrogate(s)  [ ] Guardian           Name(s): Sridevi Barnard   Phone Number(s): [phone]    BASELINE (I)ADL(s) (prior to admission):  [place]: [ ]Total  [ ] Moderate [X]Dependent    Allergies  No Known Allergies    MEDICATIONS  (STANDING):  amLODIPine   Tablet 5 milliGRAM(s) Oral every 24 hours  diVALproex Sprinkle 250 milliGRAM(s) Oral every 12 hours  enoxaparin Injectable 40 milliGRAM(s) SubCutaneous every 24 hours  escitalopram 5 milliGRAM(s) Oral every 24 hours  QUEtiapine 50 milliGRAM(s) Oral two times a day    MEDICATIONS  (PRN):  aluminum hydroxide/magnesium hydroxide/simethicone Suspension 30 milliLiter(s) Oral every 4 hours PRN Dyspepsia  melatonin 3 milliGRAM(s) Oral at bedtime PRN Insomnia    PRESENT SYMPTOMS: [X]Unable to self-report  Source if other than patient:  [ ]Family   [X]Team     Pain: [ ]yes [X]no  QOL impact -   Location -                    Aggravating factors -  Quality -  Radiation -  Timing-  Severity (0-10 scale):  Minimal acceptable level (0-10 scale):     Dyspnea:                           [ ]Mild [ ]Moderate [ ]Severe  Anxiety:                             [ ]Mild [ ]Moderate [ ]Severe  Fatigue:                             [ ]Mild [ ]Moderate [ ]Severe  Nausea:                             [ ]Mild [ ]Moderate [ ]Severe  Loss of appetite:              [ ]Mild [ ]Moderate [ ]Severe  Constipation:                    [ ]Mild [ ]Moderate [ ]Severe    Other Symptoms:  [X]All other review of systems negative     Palliative Performance Status Version 2:        30 %    http://npcrc.org/files/news/palliative_performance_scale_[email or web address].pdf    PHYSICAL EXAM:  Vital Signs Last 24 Hrs  T(C): 36.6 (16 Aug 2022 09:05), Max: 36.6 (15 Aug 2022 16:00)  T(F): 97.8 (16 Aug 2022 09:05), Max: 97.8 (15 Aug 2022 16:00)  HR: 75 (16 Aug 2022 09:05) (75 - 124)  BP: 143/78 (16 Aug 2022 09:05) (143/70 - 167/75)  BP(mean): 106 (15 Aug 2022 21:52) (106 - 106)  RR: 18 (16 Aug 2022 09:05) (16 - 20)  SpO2: 97% (16 Aug 2022 09:05) (95% - 98%)    Parameters below as of 16 Aug 2022 09:05  Patient On (Oxygen Delivery Method): room air    GENERAL: [ ]Cachexia    [X]Alert  [X]Oriented x0   [ ]Lethargic  [ ]Unarousable  [ ]Verbal  [ ]Non-Verbal  Behavioral:   [ ] Anxiety  [X] Delirium [ ] Agitation [X] Visual hallucinations  HEENT:  [X]Normal   [ ]Dry mouth   [ ]ET Tube/Trach  [ ]Oral lesions  PULMONARY:   [X]Clear [ ]Tachypnea  [ ]Audible excessive secretions   [ ]Rhonchi        [ ]Right [ ]Left [ ]Bilateral  [ ]Crackles        [ ]Right [ ]Left [ ]Bilateral  [ ]Wheezing     [ ]Right [ ]Left [ ]Bilateral  [ ]Diminished breath sounds [ ]right [ ]left [ ]bilateral  CARDIOVASCULAR:    [X]Regular [ ]Irregular [ ]Tachy  [ ]Juan Pablo [ ]Murmur [ ]Other  GASTROINTESTINAL:  [X]Soft  [ ]Distended   [ ]+BS  [ ]Non tender [ ]Tender  [ ]Other [ ]PEG [ ]OGT/ NGT  Last BM:  GENITOURINARY:  [X]Normal [ ] Incontinent   [ ]Oliguria/Anuria   [ ]Daley  MUSCULOSKELETAL:   [X]Normal   [ ]Weakness  [ ]Bed/Wheelchair bound [ ]Edema  NEUROLOGIC:   [ ]No focal deficits  [X]Cognitive impairment  [ ]Dysphagia [ ]Dysarthria [ ]Paresis [ ]Other   SKIN:   [X]Normal  [ ]Rash  [ ]Other  [ ]Pressure ulcer(s)       Present on admission [ ]y [ ]n    LABS:                        14.8   5.70  )-----------( 176      ( 15 Aug 2022 14:19 )             45.3   08-15    142  |  102  |  15  ----------------------------<  110<H>  4.0   |  29  |  0.86    Ca    9.5      15 Aug 2022 14:19  TPro  7.4  /  Alb  4.3  /  TBili  0.5  /  DBili  x   /  AST  30  /  ALT  14  /  AlkPhos  54  08-15      Urinalysis Basic - ( 15 Aug 2022 22:03 )  Color: Yellow / Appearance: Clear / S.015 / pH: x  Gluc: x / Ketone: 15 mg/dL  / Bili: Negative / Urobili: 0.2 E.U./dL   Blood: x / Protein: NEGATIVE mg/dL / Nitrite: NEGATIVE   Leuk Esterase: NEGATIVE / RBC: 5-10 /HPF / WBC < 5 /HPF   Sq Epi: x / Non Sq Epi: 0-5 /HPF / Bacteria: Present /HPF    RADIOLOGY & ADDITIONAL STUDIES:  < from: Xray Chest 1 View AP/PA (08.15.22 @ 15:42) >  IMPRESSION: No acute cardiopulmonary disease process.    REFERRALS:   [ ]Chaplaincy  [ ]Hospice  [ ]Child Life  [ ]Social Work  [X]Case management [ ]Holistic Therapy     DISCUSSION OF CASE: Daughter - to provide updates and emotional support, Outpatient PCP - to discuss prior treatment and care

## 2022-08-16 NOTE — CONSULT NOTE ADULT - PROBLEM SELECTOR RECOMMENDATION 2
Dementia with worsening agitation and aggressive episodes at her assisted living facility.  - Will need placement to another facility on discharge as current assisted living facility cannot manage her symptoms Tolerating a diet as outpatient  - pending S&S evaluation

## 2022-08-16 NOTE — PROGRESS NOTE ADULT - PROBLEM SELECTOR PLAN 1
Pt came in for acute agitation and violent behavior towards staff at [facility].  - unable to obtain EKG patient pulling off leads     Plan  per psych recs  - Continue 1:1 observation for agitation risk for now; not needed for suicidality  - reduced Depakote to 250mg bid sprinkles BID (home depakote sprinkles 250 AM, 500 PM)  - Start Seroquel 50mg bid PO (crushed - patient will not take pills) for mood instability  - c/w home Lexapro 5mg qd  - holding home olanzapine 5mg qd  -PRN recs: Seroquel 50mg q8hr PO PRN for acute agitation, haldol 1mg q8hr prn IM/IV for acute agitation not responsive to verbal redirection and with imminent risk of violence towards others  --Would repeat EKG to monitor QTc  - c/w melatonin 3mg Pt came in for acute agitation and violent behavior towards staff at [facility].  - unable to obtain EKG patient pulling off leads     Plan  per psych recs  - Continue 1:1 observation for agitation risk for now; not needed for suicidality  - reduced Depakote to 250mg bid sprinkles BID (home depakote sprinkles 250 AM, 500 PM)  - Start Seroquel 50mg bid PO (crushed - patient will not take pills) for mood instability  - c/w home Lexapro 5mg qd  - holding home olanzapine 5mg qd  -PRN recs: Seroquel 50mg q8hr PO PRN for acute agitation, haldol 1mg q8hr prn IM/IV for acute agitation not responsive to verbal redirection and with imminent risk of violence towards others  --Would repeat EKG to monitor QTc  - c/w melatonin 3mg Pt came in for acute agitation and violent behavior towards staff at [facility].  - unable to obtain EKG patient pulling off leads     Plan  per psych recs  - Continue 1:1 observation for agitation risk for now; not needed for suicidality  - reduced Depakote to 250mg bid sprinkles BID (home depakote sprinkles 250 AM, 500 PM)  - Start Seroquel 50mg bid PO (crushed - patient will not take pills) for mood instability  - c/w home Lexapro 5mg qd  - holding home olanzapine 5mg qd  -PRN recs: Seroquel 50mg q8hr PO PRN for acute agitation, haldol 1mg q8hr prn IM/IV for acute agitation not responsive to verbal redirection and with imminent risk of violence towards others  --Would repeat EKG to monitor QTc  - c/w melatonin 3mg Pt came in for acute agitation and violent behavior towards staff at [facility].  - unable to obtain EKG patient pulling off leads and combative    Plan  - home Depakote sprinkles  BID  switched to IV valproate 250 BID  - starting IM haldol 1mg IM BID in AM  - holding home Lexapro 5mg qd, olanzapine 5mg qd  - Monitor for any adverse response to antipsychotic given question of dystonic reaction with use of multiple doses of PRN olanzapine in ED.  - Obtain EKG to monitor QTc  - c/w melatonin 3mg Pt came in for acute agitation and violent behavior towards staff at [facility].  - unable to obtain EKG patient pulling off leads and combative    Plan  - home Depakote sprinkles  BID  switched to IV valproate 250 BID  - starting IM haldol 1mg IM BID in AM  - holding home Lexapro 5mg qd, olanzapine 5mg qd  - Monitor for any adverse response to antipsychotic given question of dystonic reaction with use of multiple doses of PRN olanzapine in ED.  - Obtain EKG to monitor QTc  - c/w melatonin 3mg Pt came in for acute agitation and violent behavior towards staff at [facility].  - unable to obtain EKG patient pulling off leads and combative    Plan  - home Depakote sprinkles  BID  switched to IV valproate 250 BID  - starting IM haldol 1mg IM BID in AM  - holding home Lexapro 5mg qd, olanzapine 5mg qd  - Monitor for any adverse response to antipsychotic given question of dystonic reaction with use of multiple doses of PRN olanzapine in ED.  - Obtain EKG to monitor QTc  - c/w melatonin 3mg

## 2022-08-16 NOTE — CONSULT NOTE ADULT - NSCONSULTADDITIONALINFOA_GEN_ALL_CORE
Patient assessed on 08-16-22 to manage: GOC/Advanced Directives, Symptoms, and Supportive Care. 31 Minutes; Start: 9:00AM,End: 9:31AM, Non-Face-to-Face prolonged service provided that relates to Face-to-Face care that has occurred and ongoing patient management, including one or more of the following: - Reviewed documentation from other physicians and other health care professional services - Reviewed other medical records and diagnostic / radiology study results - Coordination with patient's support system

## 2022-08-16 NOTE — BH CONSULTATION LIAISON PROGRESS NOTE - OTHER
confused content, misperceiving. no clear voiced delusions or paranoia, no SI or HI at times repeatedly grabbing at tongue lying back in bed, gait not assessed one arm in soft restraint no focal deficits, not cooperative with language tasks (naming, repetition) misperceiving people in room (PCA for daughter) but not clearly responding to internal stimuli

## 2022-08-16 NOTE — CONSULT NOTE ADULT - TIME BILLING
Emotional Support/Supportive Care and Clarification of Potential Disease Trajectory related to dementia and behavioral disturbances  Exploration of GOC including advanced directives such as HCP designation and code status

## 2022-08-16 NOTE — CONSULT NOTE ADULT - PROBLEM SELECTOR RECOMMENDATION 9
Worsening agitation with aggressive episodes over past few months requiring hospitalization multiple times. Patient was aggressive to staff at current assisted living facility.  - Psych following, recs appreciated

## 2022-08-16 NOTE — PROGRESS NOTE ADULT - SUBJECTIVE AND OBJECTIVE BOX
Medicine Progress Note (INCOMPLETE)    SUBJECTIVE / OVERNIGHT EVENTS:  O/N events:  Patient was seen and examined at bedside.  Otherwise negative ROS    MEDICATIONS  (STANDING):  amLODIPine   Tablet 5 milliGRAM(s) Oral every 24 hours  diVALproex Sprinkle 250 milliGRAM(s) Oral every 12 hours  enoxaparin Injectable 40 milliGRAM(s) SubCutaneous every 24 hours  escitalopram 5 milliGRAM(s) Oral every 24 hours  QUEtiapine 50 milliGRAM(s) Oral two times a day    MEDICATIONS  (PRN):  aluminum hydroxide/magnesium hydroxide/simethicone Suspension 30 milliLiter(s) Oral every 4 hours PRN Dyspepsia  melatonin 3 milliGRAM(s) Oral at bedtime PRN Insomnia    CAPILLARY BLOOD GLUCOSE      POCT Blood Glucose.: 137 mg/dL (15 Aug 2022 21:10)        OBJECTIVE:  Vital Signs Last 24 Hrs  T(C): 36.6 (16 Aug 2022 09:05), Max: 36.6 (15 Aug 2022 16:00)  T(F): 97.8 (16 Aug 2022 09:05), Max: 97.8 (15 Aug 2022 16:00)  HR: 75 (16 Aug 2022 09:05) (75 - 124)  BP: 143/78 (16 Aug 2022 09:05) (143/70 - 167/75)  BP(mean): 106 (15 Aug 2022 21:52) (106 - 106)  RR: 18 (16 Aug 2022 09:05) (16 - 20)  SpO2: 97% (16 Aug 2022 09:05) (95% - 98%)    Parameters below as of 16 Aug 2022 09:05  Patient On (Oxygen Delivery Method): room air        PHYSICAL EXAM:  General: AAOx3, NAD  Head: NC/AT; MMM; PERRL; EOMI;  Neck: Supple; no JVD  Respiratory: CTAB; no wheezes/rales/rhonchi  Cardiovascular: Regular rhythm/rate; S1/S2+, no murmurs, rubs gallops   Gastrointestinal: Soft; NTND; bowel sounds normal and present  Extremities: WWP; no edema/cyanosis  Neurological: CNII-XII grossly intact; no obvious focal deficits  Skin: Clean and intact. Good skin turgor. Without open wounds and sores  I&O's Summary    15 Aug 2022 07:01  -  16 Aug 2022 07:00  --------------------------------------------------------  IN: 0 mL / OUT: 900 mL / NET: -900 mL        LABS:                        14.8   5.70  )-----------( 176      ( 15 Aug 2022 14:19 )             45.3     08-15    142  |  102  |  15  ----------------------------<  110<H>  4.0   |  29  |  0.86    Ca    9.5      15 Aug 2022 14:19    TPro  7.4  /  Alb  4.3  /  TBili  0.5  /  DBili  x   /  AST  30  /  ALT  14  /  AlkPhos  54  08-15      CARDIAC MARKERS ( 15 Aug 2022 19:34 )  x     / x     / 442 U/L / x     / 10.8 ng/mL      Urinalysis Basic - ( 15 Aug 2022 22:03 )    Color: Yellow / Appearance: Clear / S.015 / pH: x  Gluc: x / Ketone: 15 mg/dL  / Bili: Negative / Urobili: 0.2 E.U./dL   Blood: x / Protein: NEGATIVE mg/dL / Nitrite: NEGATIVE   Leuk Esterase: NEGATIVE / RBC: 5-10 /HPF / WBC < 5 /HPF   Sq Epi: x / Non Sq Epi: 0-5 /HPF / Bacteria: Present /HPF        Urinalysis with Rflx Culture (collected 15 Aug 2022 22:03)          RADIOLOGY & ADDITIONAL TESTS:  Imaging from Last 24 Hours: Medicine Progress Note     SUBJECTIVE / OVERNIGHT EVENTS:  Patient was seen and examined at bedside, moaning and incomprehensive. Agitated and kicking when attempted EKG  Otherwise negative ROS    MEDICATIONS  (STANDING):  amLODIPine   Tablet 5 milliGRAM(s) Oral every 24 hours  diVALproex Sprinkle 250 milliGRAM(s) Oral every 12 hours  enoxaparin Injectable 40 milliGRAM(s) SubCutaneous every 24 hours  escitalopram 5 milliGRAM(s) Oral every 24 hours  QUEtiapine 50 milliGRAM(s) Oral two times a day    MEDICATIONS  (PRN):  aluminum hydroxide/magnesium hydroxide/simethicone Suspension 30 milliLiter(s) Oral every 4 hours PRN Dyspepsia  melatonin 3 milliGRAM(s) Oral at bedtime PRN Insomnia    CAPILLARY BLOOD GLUCOSE      POCT Blood Glucose.: 137 mg/dL (15 Aug 2022 21:10)        OBJECTIVE:  Vital Signs Last 24 Hrs  T(C): 36.6 (16 Aug 2022 09:05), Max: 36.6 (15 Aug 2022 16:00)  T(F): 97.8 (16 Aug 2022 09:05), Max: 97.8 (15 Aug 2022 16:00)  HR: 75 (16 Aug 2022 09:05) (75 - 124)  BP: 143/78 (16 Aug 2022 09:05) (143/70 - 167/75)  BP(mean): 106 (15 Aug 2022 21:52) (106 - 106)  RR: 18 (16 Aug 2022 09:05) (16 - 20)  SpO2: 97% (16 Aug 2022 09:05) (95% - 98%)    Parameters below as of 16 Aug 2022 09:05  Patient On (Oxygen Delivery Method): room air        PHYSICAL EXAM:  General: Awake alert and oriented x1, NAD, non-cooperative   Head: NC/AT; MMM; PERRL; EOMI;  Neck: Supple; no JVD  Respiratory: CTAB; no wheezes/rales/rhonchi  Cardiovascular: Regular rhythm/rate; S1/S2+, no murmurs, rubs gallops   Gastrointestinal: Soft; NTND; bowel sounds normal and present  Extremities: WWP; no edema/cyanosis  Neurological: CNII-XII grossly intact; no obvious focal deficits  Skin: Clean and intact. Good skin turgor. Without open wounds and sores  I&O's Summary    15 Aug 2022 07:01  -  16 Aug 2022 07:00  --------------------------------------------------------  IN: 0 mL / OUT: 900 mL / NET: -900 mL        LABS:                        14.8   5.70  )-----------( 176      ( 15 Aug 2022 14:19 )             45.3     08-15    142  |  102  |  15  ----------------------------<  110<H>  4.0   |  29  |  0.86    Ca    9.5      15 Aug 2022 14:19    TPro  7.4  /  Alb  4.3  /  TBili  0.5  /  DBili  x   /  AST  30  /  ALT  14  /  AlkPhos  54  08-15      CARDIAC MARKERS ( 15 Aug 2022 19:34 )  x     / x     / 442 U/L / x     / 10.8 ng/mL      Urinalysis Basic - ( 15 Aug 2022 22:03 )    Color: Yellow / Appearance: Clear / S.015 / pH: x  Gluc: x / Ketone: 15 mg/dL  / Bili: Negative / Urobili: 0.2 E.U./dL   Blood: x / Protein: NEGATIVE mg/dL / Nitrite: NEGATIVE   Leuk Esterase: NEGATIVE / RBC: 5-10 /HPF / WBC < 5 /HPF   Sq Epi: x / Non Sq Epi: 0-5 /HPF / Bacteria: Present /HPF        Urinalysis with Rflx Culture (collected 15 Aug 2022 22:03)          RADIOLOGY & ADDITIONAL TESTS:  Imaging from Last 24 Hours:

## 2022-08-16 NOTE — PROGRESS NOTE ADULT - ATTENDING COMMENTS
86 y/o female PMH HLD, GERD, Alzheimer's and possible frontotemporal dementia, sciatica sent from assisted living for agitation    Patient alert on my exam does not give answers to ROS.  Restless in bed but calm during exam    Psychiatry following, appreciate recommendations for agitation  - will initiate haldol regimen  - will remove restraints to avoid further agitation 88 y/o female PMH HLD, GERD, Alzheimer's and possible frontotemporal dementia, sciatica sent from assisted living for agitation    Patient alert on my exam does not give answers to ROS.  Restless in bed but calm during exam    Psychiatry following, appreciate recommendations for agitation  - will initiate haldol regimen  - will remove restraints to avoid further agitation

## 2022-08-16 NOTE — PROGRESS NOTE ADULT - ASSESSMENT
86 y/o female PMH HLD, GERD, Alzheimer's and possible frontotemporal dementia, sciatica sent from assisted living for agitation and violent behavior.   88 y/o female PMH HLD, GERD, Alzheimer's and possible frontotemporal dementia, sciatica sent from assisted living for agitation and violent behavior.

## 2022-08-16 NOTE — BH CONSULTATION LIAISON PROGRESS NOTE - NSBHFUPINTERVALHXFT_PSY_A_CORE
Patient was seen and evaluated at bedside. She appears calm, does not contribute much history. She is able to tell me her name and that she is in [place], but does not realize she is in a hospital or the date. She points at the PCA when asked about her daughter. She attempts to grab the coat of the attending physician and says "I was there last night". Otherwise history is unobtainable and incomprehensible muttering.  Patient was seen and evaluated at bedside. She appears calm, does not contribute much history. She is able to tell me her name and that she is in [place], but does not realize she is in a hospital or the date. She points at the PCA when asked about her daughter. She attempts to grab the coat of the attending physician and says "I was there last night". Otherwise history is unobtainable and incomprehensible muttering.  Patient was seen and evaluated at bedside. She appears calm, does not contribute much history. She is able to tell me her name and that she is in [place], but does not realize she is in a hospital or the date. She points at the PCA when asked about her daughter. She attempts to grab the coat of the attending physician and says "I was there last night". Otherwise history is unobtainable and incomprehensible muttering.

## 2022-08-17 ENCOUNTER — TRANSCRIPTION ENCOUNTER (OUTPATIENT)
Age: 87
End: 2022-08-17

## 2022-08-17 LAB
ALBUMIN SERPL ELPH-MCNC: 3.8 G/DL — SIGNIFICANT CHANGE UP (ref 3.3–5)
ALP SERPL-CCNC: 52 U/L — SIGNIFICANT CHANGE UP (ref 40–120)
ALT FLD-CCNC: 24 U/L — SIGNIFICANT CHANGE UP (ref 10–45)
ANION GAP SERPL CALC-SCNC: 17 MMOL/L — SIGNIFICANT CHANGE UP (ref 5–17)
AST SERPL-CCNC: 80 U/L — HIGH (ref 10–40)
BASOPHILS # BLD AUTO: 0.06 K/UL — SIGNIFICANT CHANGE UP (ref 0–0.2)
BASOPHILS NFR BLD AUTO: 0.7 % — SIGNIFICANT CHANGE UP (ref 0–2)
BILIRUB SERPL-MCNC: 0.7 MG/DL — SIGNIFICANT CHANGE UP (ref 0.2–1.2)
BUN SERPL-MCNC: 19 MG/DL — SIGNIFICANT CHANGE UP (ref 7–23)
CALCIUM SERPL-MCNC: 9.4 MG/DL — SIGNIFICANT CHANGE UP (ref 8.4–10.5)
CHLORIDE SERPL-SCNC: 102 MMOL/L — SIGNIFICANT CHANGE UP (ref 96–108)
CO2 SERPL-SCNC: 19 MMOL/L — LOW (ref 22–31)
CREAT SERPL-MCNC: 0.86 MG/DL — SIGNIFICANT CHANGE UP (ref 0.5–1.3)
EGFR: 66 ML/MIN/1.73M2 — SIGNIFICANT CHANGE UP
EOSINOPHIL # BLD AUTO: 0.03 K/UL — SIGNIFICANT CHANGE UP (ref 0–0.5)
EOSINOPHIL NFR BLD AUTO: 0.4 % — SIGNIFICANT CHANGE UP (ref 0–6)
GLUCOSE SERPL-MCNC: 79 MG/DL — SIGNIFICANT CHANGE UP (ref 70–99)
HCT VFR BLD CALC: 48.6 % — HIGH (ref 34.5–45)
HGB BLD-MCNC: 16.2 G/DL — HIGH (ref 11.5–15.5)
IMM GRANULOCYTES NFR BLD AUTO: 0.2 % — SIGNIFICANT CHANGE UP (ref 0–1.5)
LYMPHOCYTES # BLD AUTO: 2.07 K/UL — SIGNIFICANT CHANGE UP (ref 1–3.3)
LYMPHOCYTES # BLD AUTO: 25.5 % — SIGNIFICANT CHANGE UP (ref 13–44)
MAGNESIUM SERPL-MCNC: 2 MG/DL — SIGNIFICANT CHANGE UP (ref 1.6–2.6)
MCHC RBC-ENTMCNC: 30.1 PG — SIGNIFICANT CHANGE UP (ref 27–34)
MCHC RBC-ENTMCNC: 33.3 GM/DL — SIGNIFICANT CHANGE UP (ref 32–36)
MCV RBC AUTO: 90.2 FL — SIGNIFICANT CHANGE UP (ref 80–100)
MONOCYTES # BLD AUTO: 0.9 K/UL — SIGNIFICANT CHANGE UP (ref 0–0.9)
MONOCYTES NFR BLD AUTO: 11.1 % — SIGNIFICANT CHANGE UP (ref 2–14)
NEUTROPHILS # BLD AUTO: 5.05 K/UL — SIGNIFICANT CHANGE UP (ref 1.8–7.4)
NEUTROPHILS NFR BLD AUTO: 62.1 % — SIGNIFICANT CHANGE UP (ref 43–77)
NRBC # BLD: 0 /100 WBCS — SIGNIFICANT CHANGE UP (ref 0–0)
PHOSPHATE SERPL-MCNC: 2.9 MG/DL — SIGNIFICANT CHANGE UP (ref 2.5–4.5)
PLATELET # BLD AUTO: 168 K/UL — SIGNIFICANT CHANGE UP (ref 150–400)
POTASSIUM SERPL-MCNC: 3.8 MMOL/L — SIGNIFICANT CHANGE UP (ref 3.5–5.3)
POTASSIUM SERPL-SCNC: 3.8 MMOL/L — SIGNIFICANT CHANGE UP (ref 3.5–5.3)
PROT SERPL-MCNC: 6.9 G/DL — SIGNIFICANT CHANGE UP (ref 6–8.3)
RBC # BLD: 5.39 M/UL — HIGH (ref 3.8–5.2)
RBC # FLD: 12.6 % — SIGNIFICANT CHANGE UP (ref 10.3–14.5)
SODIUM SERPL-SCNC: 138 MMOL/L — SIGNIFICANT CHANGE UP (ref 135–145)
WBC # BLD: 8.13 K/UL — SIGNIFICANT CHANGE UP (ref 3.8–10.5)
WBC # FLD AUTO: 8.13 K/UL — SIGNIFICANT CHANGE UP (ref 3.8–10.5)

## 2022-08-17 PROCEDURE — 99233 SBSQ HOSP IP/OBS HIGH 50: CPT

## 2022-08-17 PROCEDURE — 99232 SBSQ HOSP IP/OBS MODERATE 35: CPT | Mod: GC

## 2022-08-17 RX ORDER — SODIUM CHLORIDE 9 MG/ML
1000 INJECTION INTRAMUSCULAR; INTRAVENOUS; SUBCUTANEOUS
Refills: 0 | Status: DISCONTINUED | OUTPATIENT
Start: 2022-08-17 | End: 2022-08-17

## 2022-08-17 RX ORDER — SODIUM CHLORIDE 9 MG/ML
1000 INJECTION, SOLUTION INTRAVENOUS
Refills: 0 | Status: DISCONTINUED | OUTPATIENT
Start: 2022-08-17 | End: 2022-08-19

## 2022-08-17 RX ORDER — HALOPERIDOL DECANOATE 100 MG/ML
0.5 INJECTION INTRAMUSCULAR EVERY 8 HOURS
Refills: 0 | Status: DISCONTINUED | OUTPATIENT
Start: 2022-08-17 | End: 2022-08-19

## 2022-08-17 RX ORDER — HALOPERIDOL DECANOATE 100 MG/ML
0.5 INJECTION INTRAMUSCULAR ONCE
Refills: 0 | Status: COMPLETED | OUTPATIENT
Start: 2022-08-17 | End: 2022-08-17

## 2022-08-17 RX ADMIN — SODIUM CHLORIDE 75 MILLILITER(S): 9 INJECTION, SOLUTION INTRAVENOUS at 15:05

## 2022-08-17 RX ADMIN — HALOPERIDOL DECANOATE 0.5 MILLIGRAM(S): 100 INJECTION INTRAMUSCULAR at 22:01

## 2022-08-17 RX ADMIN — HALOPERIDOL DECANOATE 0.5 MILLIGRAM(S): 100 INJECTION INTRAMUSCULAR at 15:11

## 2022-08-17 RX ADMIN — Medication 52.5 MILLIGRAM(S): at 06:24

## 2022-08-17 RX ADMIN — HALOPERIDOL DECANOATE 1 MILLIGRAM(S): 100 INJECTION INTRAMUSCULAR at 06:24

## 2022-08-17 RX ADMIN — ENOXAPARIN SODIUM 40 MILLIGRAM(S): 100 INJECTION SUBCUTANEOUS at 12:56

## 2022-08-17 RX ADMIN — SODIUM CHLORIDE 75 MILLILITER(S): 9 INJECTION INTRAMUSCULAR; INTRAVENOUS; SUBCUTANEOUS at 15:06

## 2022-08-17 RX ADMIN — Medication 52.5 MILLIGRAM(S): at 18:45

## 2022-08-17 NOTE — DISCHARGE NOTE PROVIDER - NSDCMRMEDTOKEN_GEN_ALL_CORE_FT
amLODIPine 5 mg oral tablet: 1 tab(s) orally once a day in AM  Depakote Sprinkles 125 mg oral delayed release capsule: 2 cap(s) orally once a day AM  Depakote Sprinkles 125 mg oral delayed release capsule: 4 cap(s) orally once a day (at bedtime)  Lexapro 5 mg oral tablet: 1 tab(s) orally once a day in AM  OLANZapine 5 mg oral tablet: 1 tab(s) orally once a day (at bedtime)   amLODIPine 5 mg oral tablet: 1 tab(s) orally once a day in AM  divalproex sodium 125 mg oral delayed release capsule: 2 cap(s) orally every 12 hours  QUEtiapine 25 mg oral tablet: 1 tab(s) orally every 24 hours  SEROquel 50 mg oral tablet: 1 tab(s) orally every 24 hours   amLODIPine 5 mg oral tablet: 1 tab(s) orally once a day in AM  divalproex sodium 125 mg oral delayed release capsule: 2 cap(s) orally every 12 hours  QUEtiapine 25 mg oral tablet: 1 tab(s) orally every 24 hours    Take this 25mg dose at 8AM  SEROquel 50 mg oral tablet: 1 tab(s) orally every 24 hours    Take this 50mg dose at 8PM   amLODIPine 5 mg oral tablet: 1 tab(s) orally once a day in AM  divalproex sodium 125 mg oral delayed release capsule: 2 cap(s) orally every 12 hours  QUEtiapine 50 mg oral tablet: 1 tab(s) orally every 12 hours   amLODIPine 5 mg oral tablet: 1 tab(s) orally once a day in AM  divalproex sodium 125 mg oral delayed release capsule: 2 cap(s) orally every 12 hours  escitalopram 5 mg oral tablet: 1 tab(s) orally every 24 hours  polyethylene glycol 3350 oral powder for reconstitution: 17 gram(s) orally once a day  QUEtiapine 25 mg oral tablet: 1 tab(s) orally every 6 hours, As needed, breakthrough agitation  QUEtiapine 50 mg oral tablet: 1 tab(s) orally every 12 hours  senna leaf extract oral tablet: 2 tab(s) orally once a day (at bedtime)

## 2022-08-17 NOTE — PROGRESS NOTE ADULT - PROBLEM SELECTOR PLAN 6
Patient with history of GERD, currently not on any medication.    - Can start Pepcid if symptomatic and taking pills Patient with history of GERD, currently not on any medication.      - can start pantoprazole if having symptoms,
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
## 2022-08-17 NOTE — DISCHARGE NOTE PROVIDER - HOSPITAL COURSE
#Discharge: do not delete    Patient is 88 y/o female with PMH of HLD, GERD, Alzheimer's and possible frontotemporal dementia, and sciatica sent from assisted living for agitation and violent behavior.      Problem List/Main Diagnoses (system-based):     #Agitation: Pt came in for acute agitation and violent behavior towards staff at [facility]. EKG was attempted but was unable to be obtained due to patient pulling off leads and combative  - home Depakote sprinkles  BID was switched to IV valproate 250 BID  - IM haldol 1mg IM BID started  - home Lexapro 5mg qd and olanzapine 5mg qd were held  - continued on melatonin 3mg.    Plan:  - continue home Depakote sprinkles   - restart home Lexapro and olanzapine    #Alzheimer disease: patient with Alzheimer's and possible frontotemporal dementia (no previous MRIs) presenting with worsening agitation, likely in setting of urinary retention, medication noncompliance, sciatica.    - patient managed as above  - patient too agitated for clinical evaluation.    #Urinary retention: patient with urinary retention on admission which likely contributed to symptoms. Etiology unclear, may be in setting of detrusor underactivity vs medication side effect. Last PVR 96  - S/c q6 hrs    #Hypertension: patient was continued on home amlodipine 5 qd.    Plan:  - continue home amlodipine 5qd    #HLD (hyperlipidemia): Patient with history of hyperlipidemia, not currently on any medications.      Plan:  - Can consider lipid panel once patient less agitated.    #GERD (gastroesophageal reflux disease): patient with history of GERD, currently not on any medication.      Plan:  - Can start Pepcid if symptomatic and taking pills.    #Sciatica: patient with history of sciatica, per daughter she frequently has back pain but not currently on any medications. It's possible back pain was contributing to behavioral disturbance but she was unable to express.    - If patient remains agitated after resolution of urinary retention, will try Toradol IV.    Patient was [facility] to: [facility]    New medications: none    Changes to old medications: none    Medications that were stopped: none    Items to Follow up as outpatient: none    Physical exam at time of discharge:     PHYSICAL EXAM:  General: AAOx3, NAD  Head: NC/AT; MMM; PERRL; EOMI;  Neck: Supple; no JVD  Respiratory: CTAB; no wheezes/rales/rhonchi  Cardiovascular: Regular rhythm/rate; S1/S2+, no murmurs, rubs gallops   Gastrointestinal: Soft; NTND; bowel sounds normal and present  Extremities: WWP; no edema/cyanosis  Neurological: CNII-XII grossly intact; no obvious focal deficits  Skin: Clean and intact. Good skin turgor. Without open wounds and sores #Discharge: do not delete    Patient is 88 y/o female with PMH of HLD, GERD, Alzheimer's and possible frontotemporal dementia, and sciatica sent from assisted living for agitation and violent behavior.      Problem List/Main Diagnoses (system-based):     #Agitation: Pt came in for acute agitation and violent behavior towards staff at [facility]. EKG was attempted but was unable to be obtained due to patient pulling off leads and combative  - home Depakote sprinkles  BID was switched to IV valproate 250 BID  - IM haldol 1mg IM BID started  - home Lexapro 5mg qd and olanzapine 5mg qd were held  - continued on melatonin 3mg.    Plan:  - continue home Depakote sprinkles   - restart home Lexapro and olanzapine    #Alzheimer disease: patient with Alzheimer's and possible frontotemporal dementia (no previous MRIs) presenting with worsening agitation, likely in setting of urinary retention, medication noncompliance, sciatica.    - patient managed as above  - patient too agitated for clinical evaluation.    #Urinary retention: patient with urinary retention on admission which likely contributed to symptoms. Etiology unclear, may be in setting of detrusor underactivity vs medication side effect. Last PVR 96  - S/c q6 hrs    #Hypertension: patient was continued on home amlodipine 5 qd.    Plan:  - continue home amlodipine 5qd    #HLD (hyperlipidemia): Patient with history of hyperlipidemia, not currently on any medications.      Plan:  - Can consider lipid panel once patient less agitated.    #GERD (gastroesophageal reflux disease): patient with history of GERD, currently not on any medication.      Plan:  - Can start Pepcid if symptomatic and taking pills.    #Sciatica: patient with history of sciatica, per daughter she frequently has back pain but not currently on any medications. It's possible back pain was contributing to behavioral disturbance but she was unable to express.    - If patient remains agitated after resolution of urinary retention, will try Toradol IV.    Patient was discharged to: [facility]    New medications: none    Changes to old medications: none    Medications that were stopped: none    Items to Follow up as outpatient: none    Physical exam at time of discharge:     PHYSICAL EXAM:  General: AAOx3, NAD  Head: NC/AT; MMM; PERRL; EOMI;  Neck: Supple; no JVD  Respiratory: CTAB; no wheezes/rales/rhonchi  Cardiovascular: Regular rhythm/rate; S1/S2+, no murmurs, rubs gallops   Gastrointestinal: Soft; NTND; bowel sounds normal and present  Extremities: WWP; no edema/cyanosis  Neurological: CNII-XII grossly intact; no obvious focal deficits  Skin: Clean and intact. Good skin turgor. Without open wounds and sores #Discharge: do not delete    Patient is 86 y/o female with PMH of HLD, GERD, Alzheimer's and possible frontotemporal dementia, and sciatica sent from assisted living for agitation and violent behavior.      Problem List/Main Diagnoses (system-based):     #Agitation: Pt came in for acute agitation and violent behavior towards staff at [facility]. EKG was attempted but was unable to be obtained due to patient pulling off leads and combative  - home Depakote sprinkles  BID was switched to IV valproate 250 BID  - IM haldol 1mg IM BID started  - home Lexapro 5mg qd and olanzapine 5mg qd were held  - continued on melatonin 3mg.    Plan:  - continue home Depakote sprinkles   - restart home Lexapro and olanzapine    #Alzheimer disease: patient with Alzheimer's and possible frontotemporal dementia (no previous MRIs) presenting with worsening agitation, likely in setting of urinary retention, medication noncompliance, sciatica.    - patient managed as above  - patient too agitated for clinical evaluation.    #Urinary retention: patient with urinary retention on admission which likely contributed to symptoms. Etiology unclear, may be in setting of detrusor underactivity vs medication side effect. Last PVR 96  - S/c q6 hrs    #Hypertension: patient was continued on home amlodipine 5 qd.    Plan:  - continue home amlodipine 5qd    #HLD (hyperlipidemia): Patient with history of hyperlipidemia, not currently on any medications.      Plan:  - Can consider lipid panel once patient less agitated.    #GERD (gastroesophageal reflux disease): patient with history of GERD, currently not on any medication.      Plan:  - Can start Pepcid if symptomatic and taking pills.    #Sciatica: patient with history of sciatica, per daughter she frequently has back pain but not currently on any medications. It's possible back pain was contributing to behavioral disturbance but she was unable to express.    - If patient remains agitated after resolution of urinary retention, will try Toradol IV.    Patient was discharged to: [facility]    New medications: none    Changes to old medications: none    Medications that were stopped: none    Items to Follow up as outpatient: none    Physical exam at time of discharge:     PHYSICAL EXAM:  General: AAOx3, NAD  Head: NC/AT; MMM; PERRL; EOMI;  Neck: Supple; no JVD  Respiratory: CTAB; no wheezes/rales/rhonchi  Cardiovascular: Regular rhythm/rate; S1/S2+, no murmurs, rubs gallops   Gastrointestinal: Soft; NTND; bowel sounds normal and present  Extremities: WWP; no edema/cyanosis  Neurological: CNII-XII grossly intact; no obvious focal deficits  Skin: Clean and intact. Good skin turgor. Without open wounds and sores #Discharge: do not delete    Patient is 88 y/o female with PMH of HLD, GERD, Alzheimer's and possible frontotemporal dementia, and sciatica sent from assisted living for agitation and violent behavior.      Problem List/Main Diagnoses (system-based):     #Agitation: Pt came in for acute agitation and history of violent behavior towards staff at [facility]. Patient resting comfortably now.   c/w depakote sprinkles 250 BID   - c/w seroquel 25 BID with 12.5 PRN QHS  - c/w melatonin 3mg  - IM haldol 0.5 TID standing changed to PRN   - holding home Lexapro 5mg qd, olanzapine 5mg qd  - PRN agitation: haldol 0.5 TID, quetiapine 12.5mg po Q6H  - Obtain EKG to monitor QTc.    Plan:  - continue home medications     #Alzheimer disease: patient with Alzheimer's and possible frontotemporal dementia (no previous MRIs) presenting with worsening agitation, likely in setting of urinary retention, medication noncompliance, sciatica.    - patient managed as above    #Urinary retention: patient with urinary retention on admission which likely contributed to symptoms. Able to void independently now. PVR<200      #Hypertension: patient was continued on home amlodipine 5 qd.    Plan:  - continue home amlodipine 5qd    #HLD (hyperlipidemia): Patient with history of hyperlipidemia, not currently on any medications.     #GERD (gastroesophageal reflux disease): patient with history of GERD. Given Protonix 40    #Sciatica: patient with history of sciatica, per daughter she frequently has back pain but not currently on any medications. It's possible back pain was contributing to behavioral disturbance but she was unable to express.    - If patient remains agitated after resolution of urinary retention, will try Toradol IV.    Patient was discharged to: [facility]    New medications: none    Changes to old medications: none    Medications that were stopped: none    Items to Follow up as outpatient: none    Physical exam at time of discharge:     PHYSICAL EXAM:  General: AAOx2-3 person, sometimes place or time, NAD  Head: NC/AT; MMM; PERRL; EOMI;  Neck: Supple; no JVD  Respiratory: CTAB; no wheezes/rales/rhonchi  Cardiovascular: Regular rhythm/rate; S1/S2+, no murmurs, rubs gallops   Gastrointestinal: Soft; NTND; bowel sounds normal and present  Extremities: WWP; no edema/cyanosis  Neurological: CNII-XII grossly intact; no obvious focal deficits  Skin: Clean and intact. Good skin turgor. Without open wounds and sores #Discharge: do not delete    Patient is 88 y/o female with PMH of HLD, GERD, Alzheimer's and possible frontotemporal dementia, and sciatica sent from assisted living for agitation and violent behavior.      Problem List/Main Diagnoses (system-based):     #Agitation: Pt came in for acute agitation and history of violent behavior towards staff at [facility]. Patient resting comfortably now.   c/w depakote sprinkles 250 BID   - c/w seroquel 25 BID with 12.5 PRN QHS  - c/w melatonin 3mg  - IM haldol 0.5 TID standing changed to PRN   - holding home Lexapro 5mg qd, olanzapine 5mg qd  - PRN agitation: haldol 0.5 TID, quetiapine 12.5mg po Q6H  - Obtain EKG to monitor QTc.    Plan:  - continue home medications     #Alzheimer disease: patient with Alzheimer's and possible frontotemporal dementia (no previous MRIs) presenting with worsening agitation, likely in setting of urinary retention, medication noncompliance, sciatica.    - patient managed as above    #Urinary retention: patient with urinary retention on admission which likely contributed to symptoms. Able to void independently now. PVR<200      #Hypertension: patient was continued on home amlodipine 5 qd.    Plan:  - continue home amlodipine 5qd    #HLD (hyperlipidemia): Patient with history of hyperlipidemia, not currently on any medications.     #GERD (gastroesophageal reflux disease): patient with history of GERD. Given Protonix 40    #Sciatica: patient with history of sciatica, per daughter she frequently has back pain but not currently on any medications. It's possible back pain was contributing to behavioral disturbance but she was unable to express.    - If patient remains agitated after resolution of urinary retention, will try Toradol IV.    Patient was discharged to: [facility]    New medications: none    Changes to old medications: none    Medications that were stopped: none    Items to Follow up as outpatient: none    Physical exam at time of discharge:     PHYSICAL EXAM:  General: AAOx2-3 person, sometimes place or time, NAD  Head: NC/AT; MMM; PERRL; EOMI;  Neck: Supple; no JVD  Respiratory: CTAB; no wheezes/rales/rhonchi  Cardiovascular: Regular rhythm/rate; S1/S2+, no murmurs, rubs gallops   Gastrointestinal: Soft; NTND; bowel sounds normal and present  Extremities: WWP; no edema/cyanosis  Neurological: CNII-XII grossly intact; no obvious focal deficits  Skin: Clean and intact. Good skin turgor. Without open wounds and sores #Discharge: do not delete    Patient is 88 y/o female with PMH of HLD, GERD, Alzheimer's and possible frontotemporal dementia, and sciatica sent from assisted living for agitation and violent behavior.      Problem List/Main Diagnoses (system-based):     #Agitation: Pt came in for acute agitation and history of violent behavior towards staff at [facility]. Patient resting comfortably now.   c/w depakote sprinkles 250 BID   - c/w seroquel 25 BID with 12.5 PRN QHS  - c/w melatonin 3mg  - IM haldol 0.5 TID standing changed to PRN   - holding home Lexapro 5mg qd, olanzapine 5mg qd  - PRN agitation: haldol 0.5 TID, quetiapine 12.5mg po Q6H  - Obtain EKG to monitor QTc.    Plan:  - continue home medications     #Alzheimer disease: patient with Alzheimer's and possible frontotemporal dementia (no previous MRIs) presenting with worsening agitation, likely in setting of urinary retention, medication noncompliance, sciatica.    - patient managed as above    #Urinary retention: patient with urinary retention on admission which likely contributed to symptoms. Able to void independently now. PVR<200      #Hypertension: patient was continued on home amlodipine 5 qd.    Plan:  - continue home amlodipine 5qd    #HLD (hyperlipidemia): Patient with history of hyperlipidemia, not currently on any medications.     #GERD (gastroesophageal reflux disease): patient with history of GERD. Given Protonix 40    #Sciatica: patient with history of sciatica, per daughter she frequently has back pain but not currently on any medications. It's possible back pain was contributing to behavioral disturbance but she was unable to express.    - If patient remains agitated after resolution of urinary retention, will try Toradol IV.    Patient was discharged to: [facility]    New medications: none    Changes to old medications: none    Medications that were stopped: none    Items to Follow up as outpatient: none    Physical exam at time of discharge:     PHYSICAL EXAM:  General: AAOx2-3 person, sometimes place or time, NAD  Head: NC/AT; MMM; PERRL; EOMI;  Neck: Supple; no JVD  Respiratory: CTAB; no wheezes/rales/rhonchi  Cardiovascular: Regular rhythm/rate; S1/S2+, no murmurs, rubs gallops   Gastrointestinal: Soft; NTND; bowel sounds normal and present  Extremities: WWP; no edema/cyanosis  Neurological: CNII-XII grossly intact; no obvious focal deficits  Skin: Clean and intact. Good skin turgor. Without open wounds and sores #Discharge: do not delete    Patient is 86 y/o female with PMH of HLD, GERD, Alzheimer's and possible frontotemporal dementia, and sciatica sent from assisted living for agitation and violent behavior.      Problem List/Main Diagnoses (system-based):     #Alzheimers disease with behavioral disturbances   Pt came in for acute agitation and history of violent behavior in setting of Alzheimers dementia towards staff at [facility]  Patient with Alzheimer's and possible frontotemporal dementia (no previous MRIs) presenting with worsening agitation, likely in setting of urinary retention, medication noncompliance, sciatica.  Home medications depakote sprinkles 250mg AM 500mg PM, melatonin 3mg, lexapro 5mg qd, olanzapine 5mg qd   c/w depakote sprinkles 250 BID   - c/w seroquel 25mg AM 50mg PM  - c/w melatonin 3mg  - holding home Lexapro 5mg qd, olanzapine 5mg qd  - PRN agitation: haldol 0.5 TID, quetiapine 12.5mg po Q6H  - Obtain EKG to monitor QTc.    #Urinary retention: patient with urinary retention on admission which likely contributed to symptoms. Able to void independently now. PVR<200      #Hypertension: patient was continued on home amlodipine 5 qd.    Plan:  - continue home amlodipine 5qd    #HLD (hyperlipidemia): Patient with history of hyperlipidemia, not currently on any medications.     #GERD (gastroesophageal reflux disease): patient with history of GERD. Given Protonix 40    #Sciatica: patient with history of sciatica, per daughter she frequently has back pain but not currently on any medications. It's possible back pain was contributing to behavioral disturbance but she was unable to express.    - If patient remains agitated after resolution of urinary retention, will try Toradol IV.    Patient was discharged to: ______________________________________    New medications: seroquel 25mg AM, 50mg PM    Changes to old medications: Depakote sprinkles 250 AM 500mg PM changed to 250 BID    Medications that were stopped: STOP Lexapro 5mg qd, olanzapine 5mg qd    Items to Follow up as outpatient: none    Physical exam at time of discharge:     PHYSICAL EXAM:  General: AAOx2-3 person, sometimes place or time, NAD  Head: NC/AT; MMM; PERRL; EOMI;  Neck: Supple; no JVD  Respiratory: CTAB; no wheezes/rales/rhonchi  Cardiovascular: Regular rhythm/rate; S1/S2+, no murmurs, rubs gallops   Gastrointestinal: Soft; NTND; bowel sounds normal and present  Extremities: WWP; no edema/cyanosis  Neurological: CNII-XII grossly intact; no obvious focal deficits  Skin: Clean and intact. Good skin turgor. Without open wounds and sores #Discharge: do not delete    Patient is 86 y/o female with PMH of HLD, GERD, Alzheimer's and possible frontotemporal dementia, and sciatica sent from assisted living for agitation and violent behavior.      Problem List/Main Diagnoses (system-based):     #Alzheimers disease with behavioral disturbances   Pt came in for acute agitation and history of violent behavior in setting of Alzheimers dementia towards staff at [facility]  Patient with Alzheimer's and possible frontotemporal dementia (no previous MRIs) presenting with worsening agitation, likely in setting of urinary retention, medication noncompliance, sciatica.  Home medications depakote sprinkles 250mg AM 500mg PM, melatonin 3mg, lexapro 5mg qd, olanzapine 5mg qd   c/w depakote sprinkles 250 BID   - c/w seroquel 25mg AM 50mg PM  - c/w melatonin 3mg  - holding home Lexapro 5mg qd, olanzapine 5mg qd  - PRN agitation: haldol 0.5 TID, quetiapine 12.5mg po Q6H  - Obtain EKG to monitor QTc.    #Urinary retention: patient with urinary retention on admission which likely contributed to symptoms. Able to void independently now. PVR<200      #Hypertension: patient was continued on home amlodipine 5 qd.    Plan:  - continue home amlodipine 5qd    #HLD (hyperlipidemia): Patient with history of hyperlipidemia, not currently on any medications.     #GERD (gastroesophageal reflux disease): patient with history of GERD. Given Protonix 40    #Sciatica: patient with history of sciatica, per daughter she frequently has back pain but not currently on any medications. It's possible back pain was contributing to behavioral disturbance but she was unable to express.    - If patient remains agitated after resolution of urinary retention, will try Toradol IV.    Patient was discharged to: ______________________________________    New medications: seroquel 25mg AM, 50mg PM    Changes to old medications: Depakote sprinkles 250 AM 500mg PM changed to 250 BID    Medications that were stopped: STOP Lexapro 5mg qd, olanzapine 5mg qd    Items to Follow up as outpatient: none    Physical exam at time of discharge:     PHYSICAL EXAM:  General: AAOx2-3 person, sometimes place or time, NAD  Head: NC/AT; MMM; PERRL; EOMI;  Neck: Supple; no JVD  Respiratory: CTAB; no wheezes/rales/rhonchi  Cardiovascular: Regular rhythm/rate; S1/S2+, no murmurs, rubs gallops   Gastrointestinal: Soft; NTND; bowel sounds normal and present  Extremities: WWP; no edema/cyanosis  Neurological: CNII-XII grossly intact; no obvious focal deficits  Skin: Clean and intact. Good skin turgor. Without open wounds and sores #Discharge: do not delete    Patient is 86 y/o female with PMH of HLD, GERD, Alzheimer's and possible frontotemporal dementia, and sciatica sent from assisted living for agitation and violent behavior.      Problem List/Main Diagnoses (system-based):     #Alzheimers disease with behavioral disturbances   Pt came in for acute agitation and history of violent behavior in setting of Alzheimers dementia towards staff at [facility]  Patient with Alzheimer's and possible frontotemporal dementia (no previous MRIs) presenting with worsening agitation, likely in setting of urinary retention, medication noncompliance, sciatica.  Home medications depakote sprinkles 250mg AM 500mg PM, melatonin 3mg, lexapro 5mg qd, olanzapine 5mg qd   c/w depakote sprinkles 250 BID   - c/w seroquel 25mg AM 50mg PM  - c/w melatonin 3mg  - holding home Lexapro 5mg qd, olanzapine 5mg qd  - PRN agitation: haldol 0.5 TID, quetiapine 12.5mg po Q6H  - Obtain EKG to monitor QTc.    #Urinary retention: patient with urinary retention on admission which likely contributed to symptoms. Able to void independently now. PVR<200      #Hypertension: patient was continued on home amlodipine 5 qd.    Plan:  - continue home amlodipine 5qd    #HLD (hyperlipidemia): Patient with history of hyperlipidemia, not currently on any medications.     #GERD (gastroesophageal reflux disease): patient with history of GERD. Given Protonix 40    #Sciatica: patient with history of sciatica, per daughter she frequently has back pain but not currently on any medications. It's possible back pain was contributing to behavioral disturbance but she was unable to express.    - If patient remains agitated after resolution of urinary retention, will try Toradol IV.    Patient was discharged to: ______________________________________    New medications: seroquel 25mg AM, 50mg PM    Changes to old medications: Depakote sprinkles 250 AM 500mg PM changed to 250 BID    Medications that were stopped: STOP Lexapro 5mg qd, olanzapine 5mg qd    Items to Follow up as outpatient: none    Physical exam at time of discharge:     PHYSICAL EXAM:  General: AAOx2-3 person, sometimes place or time, NAD  Head: NC/AT; MMM; PERRL; EOMI;  Neck: Supple; no JVD  Respiratory: CTAB; no wheezes/rales/rhonchi  Cardiovascular: Regular rhythm/rate; S1/S2+, no murmurs, rubs gallops   Gastrointestinal: Soft; NTND; bowel sounds normal and present  Extremities: WWP; no edema/cyanosis  Neurological: CNII-XII grossly intact; no obvious focal deficits  Skin: Clean and intact. Good skin turgor. Without open wounds and sores #Discharge: do not delete    Patient is 86 y/o female with PMH of HLD, GERD, Alzheimer's and possible frontotemporal dementia, and sciatica sent from assisted living for agitation and violent behavior.      Problem List/Main Diagnoses (system-based):     #Alzheimers disease with behavioral disturbances   Pt came in for acute agitation and history of violent behavior in setting of Alzheimers dementia towards staff at [facility]  Patient with Alzheimer's and possible frontotemporal dementia (no previous MRIs) presenting with worsening agitation, likely in setting of urinary retention, medication noncompliance, sciatica.  Home medications depakote sprinkles 250mg AM 500mg PM, melatonin 3mg, lexapro 5mg qd, olanzapine 5mg qd   - c/w depakote sprinkles 250 BID   - c/w seroquel 25mg AM 50mg PM, seroquel 25mg PO q6 PRN for break through agitation  - c/w melatonin 3mg  - holding home Lexapro 5mg qd, olanzapine 5mg qd  - PRN agitation: haldol 0.5 TID, quetiapine 12.5mg po Q6H      #Urinary retention: patient with urinary retention on admission which likely contributed to symptoms. Able to void independently now. PVR<200  - Encourage patient to urinate, needs redirection    #Hypertension: patient was continued on home amlodipine 5 qd.  Plan:  - continue home amlodipine 5qd    #HLD (hyperlipidemia): Patient with history of hyperlipidemia, not currently on any medications.  Given patient's medication, no indication to start new statin medication    #GERD (gastroesophageal reflux disease): patient with history of GERD. Given Protonix 40  Plan:  - Continue PO protonix 40 qd    #Sciatica: patient with history of sciatica, per daughter she frequently has back pain but not currently on any medications. It's possible back pain was contributing to behavioral disturbance but she was unable to express.    Plan:  - Tylenol 650mg PRN q6 for back pain, patient not complaining of pain    Patient was discharged to [facility]    New medications: seroquel 25mg AM, 50mg PM    Changes to old medications: Depakote sprinkles 250 AM 500mg PM changed to 250 BID    Medications that were stopped: STOP Lexapro 5mg qd, olanzapine 5mg qd    Items to Follow up as outpatient: none    Physical exam at time of discharge:     PHYSICAL EXAM:  General: AAOx1-2 person, sometimes place or time, NAD  Head: NC/AT; MMM; PERRL; EOMI;  Neck: Supple; no JVD  Respiratory: CTAB; no wheezes/rales/rhonchi  Cardiovascular: Regular rhythm/rate; S1/S2+, no murmurs, rubs gallops   Gastrointestinal: Soft; NTND; bowel sounds normal and present  Extremities: WWP; no edema/cyanosis  Neurological: Patient strength and sensation intact, EOMI intact  Skin: Clean and intact. Good skin turgor. Without open wounds and sores #Discharge: do not delete    Patient is 86 y/o female with PMH of HLD, GERD, Alzheimer's and possible frontotemporal dementia, and sciatica sent from assisted living for agitation and violent behavior.      Problem List/Main Diagnoses (system-based):     #Alzheimers disease with behavioral disturbances   Pt came in for acute agitation and history of violent behavior in setting of Alzheimers dementia towards staff at [facility]  Patient with Alzheimer's and possible frontotemporal dementia (no previous MRIs) presenting with worsening agitation, likely in setting of urinary retention, medication noncompliance, sciatica.  Home medications depakote sprinkles 250mg AM 500mg PM, melatonin 3mg, lexapro 5mg qd, olanzapine 5mg qd   - c/w depakote sprinkles 250 BID   - c/w seroquel 25mg AM 50mg PM, seroquel 25mg PO q6 PRN for break through agitation  - c/w melatonin 3mg  - holding home Lexapro 5mg qd, olanzapine 5mg qd  - PRN agitation: haldol 0.5 TID, quetiapine 12.5mg po Q6H      #Urinary retention: patient with urinary retention on admission which likely contributed to symptoms. Able to void independently now. PVR<200  - Encourage patient to urinate, needs redirection    #Hypertension: patient was continued on home amlodipine 5 qd.  Plan:  - continue home amlodipine 5qd    #HLD (hyperlipidemia): Patient with history of hyperlipidemia, not currently on any medications.  Given patient's medication, no indication to start new statin medication    #GERD (gastroesophageal reflux disease): patient with history of GERD. Given Protonix 40  Plan:  - Continue PO protonix 40 qd    #Sciatica: patient with history of sciatica, per daughter she frequently has back pain but not currently on any medications. It's possible back pain was contributing to behavioral disturbance but she was unable to express.    Plan:  - Tylenol 650mg PRN q6 for back pain, patient not complaining of pain    Patient was discharged to [facility]    New medications: seroquel 25mg AM, 50mg PM    Changes to old medications: Depakote sprinkles 250 AM 500mg PM changed to 250 BID    Medications that were stopped: STOP Lexapro 5mg qd, olanzapine 5mg qd    Items to Follow up as outpatient: none    Physical exam at time of discharge:     PHYSICAL EXAM:  General: AAOx1-2 person, sometimes place or time, NAD  Head: NC/AT; MMM; PERRL; EOMI;  Neck: Supple; no JVD  Respiratory: CTAB; no wheezes/rales/rhonchi  Cardiovascular: Regular rhythm/rate; S1/S2+, no murmurs, rubs gallops   Gastrointestinal: Soft; NTND; bowel sounds normal and present  Extremities: WWP; no edema/cyanosis  Neurological: Patient strength and sensation intact, EOMI intact  Skin: Clean and intact. Good skin turgor. Without open wounds and sores #Discharge: do not delete    Patient is 88 y/o female with PMH of HLD, GERD, Alzheimer's and possible frontotemporal dementia, and sciatica sent from assisted living for agitation and violent behavior.      Problem List/Main Diagnoses (system-based):     #Alzheimers disease with behavioral disturbances   Pt came in for acute agitation and history of violent behavior in setting of Alzheimers dementia towards staff at [facility]  Patient with Alzheimer's and possible frontotemporal dementia (no previous MRIs) presenting with worsening agitation, likely in setting of urinary retention, medication noncompliance, sciatica.  Home medications depakote sprinkles 250mg AM 500mg PM, melatonin 3mg, lexapro 5mg qd, olanzapine 5mg qd   - c/w depakote sprinkles 250 BID   - c/w seroquel 25mg AM 50mg PM, seroquel 25mg PO q6 PRN for break through agitation  - c/w melatonin 3mg  - holding home Lexapro 5mg qd, olanzapine 5mg qd  - PRN agitation: haldol 0.5 TID, quetiapine 12.5mg po Q6H      #Urinary retention: patient with urinary retention on admission which likely contributed to symptoms. Able to void independently now. PVR<200  - Encourage patient to urinate, needs redirection    #Hypertension: patient was continued on home amlodipine 5 qd.  Plan:  - continue home amlodipine 5qd    #HLD (hyperlipidemia): Patient with history of hyperlipidemia, not currently on any medications.  Given patient's medication, no indication to start new statin medication    #GERD (gastroesophageal reflux disease): patient with history of GERD. Given Protonix 40  Plan:  - Continue PO protonix 40 qd    #Sciatica: patient with history of sciatica, per daughter she frequently has back pain but not currently on any medications. It's possible back pain was contributing to behavioral disturbance but she was unable to express.    Plan:  - Tylenol 650mg PRN q6 for back pain, patient not complaining of pain    Patient was discharged to [facility]    New medications: seroquel 25mg AM, 50mg PM    Changes to old medications: Depakote sprinkles 250 AM 500mg PM changed to 250 BID    Medications that were stopped: STOP Lexapro 5mg qd, olanzapine 5mg qd    Items to Follow up as outpatient: none    Physical exam at time of discharge:     PHYSICAL EXAM:  General: AAOx1-2 person, sometimes place or time, NAD  Head: NC/AT; MMM; PERRL; EOMI;  Neck: Supple; no JVD  Respiratory: CTAB; no wheezes/rales/rhonchi  Cardiovascular: Regular rhythm/rate; S1/S2+, no murmurs, rubs gallops   Gastrointestinal: Soft; NTND; bowel sounds normal and present  Extremities: WWP; no edema/cyanosis  Neurological: Patient strength and sensation intact, EOMI intact  Skin: Clean and intact. Good skin turgor. Without open wounds and sores #Discharge: do not delete    Patient is 88 y/o female with PMH of HLD, GERD, Alzheimer's and possible frontotemporal dementia, and sciatica sent from assisted living for agitation and violent behavior.      Problem List/Main Diagnoses (system-based):     #Alzheimers disease with behavioral disturbances   Pt came in for acute agitation and history of violent behavior in setting of Alzheimers dementia towards staff at [facility]  Patient with Alzheimer's and possible frontotemporal dementia (no previous MRIs) presenting with worsening agitation, likely in setting of urinary retention, medication noncompliance, sciatica.  Home medications depakote sprinkles 250mg AM 500mg PM, melatonin 3mg, lexapro 5mg qd, olanzapine 5mg qd   - c/w depakote sprinkles 250 BID   - c/w seroquel 25mg AM 50mg PM, seroquel 25mg PO q6 PRN for break through agitation  - c/w melatonin 3mg  - holding home Lexapro 5mg qd, olanzapine 5mg qd  - PRN agitation: haldol 0.5 TID, quetiapine 12.5mg po Q6H      #Urinary retention: patient with urinary retention on admission which likely contributed to symptoms. Able to void independently now. PVR<200  - Encourage patient to urinate, needs redirection    #Hypertension: patient was continued on home amlodipine 5 qd.  Plan:  - continue home amlodipine 5qd    #HLD (hyperlipidemia): Patient with history of hyperlipidemia, not currently on any medications.  Given patient's medication, no indication to start new statin medication    #GERD (gastroesophageal reflux disease): patient with history of GERD. Given Protonix 40qd, resolved.  Plan:  - Will not need prescription for protonix, OTC pepcid PRN.    #Sciatica: patient with history of sciatica, per daughter she frequently has back pain but not currently on any medications. It's possible back pain was contributing to behavioral disturbance but she was unable to express.    Plan:  - Tylenol 650mg PRN q6 for back pain, patient not complaining of pain    Patient was discharged to [facility]    New medications: seroquel 25mg AM, 50mg PM    Changes to old medications: Depakote sprinkles 250 AM 500mg PM changed to 250 BID    Medications that were stopped: STOP Lexapro 5mg qd, olanzapine 5mg qd    Items to Follow up as outpatient: none    Physical exam at time of discharge:     PHYSICAL EXAM:  General: AAOx1-2 person, sometimes place or time, NAD  Head: NC/AT; MMM; PERRL; EOMI;  Neck: Supple; no JVD  Respiratory: CTAB; no wheezes/rales/rhonchi  Cardiovascular: Regular rhythm/rate; S1/S2+, no murmurs, rubs gallops   Gastrointestinal: Soft; NTND; bowel sounds normal and present  Extremities: WWP; no edema/cyanosis  Neurological: Patient strength and sensation intact, EOMI intact  Skin: Clean and intact. Good skin turgor. Without open wounds and sores #Discharge: do not delete    Patient is 88 y/o female with PMH of HLD, GERD, Alzheimer's and possible frontotemporal dementia, and sciatica sent from assisted living for agitation and violent behavior.      Problem List/Main Diagnoses (system-based):     #Alzheimers disease with behavioral disturbances   Pt came in for acute agitation and history of violent behavior in setting of Alzheimers dementia towards staff at [facility]  Patient with Alzheimer's and possible frontotemporal dementia (no previous MRIs) presenting with worsening agitation, likely in setting of urinary retention, medication noncompliance, sciatica.  Home medications depakote sprinkles 250mg AM 500mg PM, melatonin 3mg, lexapro 5mg qd, olanzapine 5mg qd   - c/w depakote sprinkles 250 BID   - c/w seroquel 25mg AM 50mg PM, seroquel 25mg PO q6 PRN for break through agitation  - c/w melatonin 3mg  - holding home Lexapro 5mg qd, olanzapine 5mg qd  - PRN agitation: haldol 0.5 TID, quetiapine 12.5mg po Q6H      #Urinary retention: patient with urinary retention on admission which likely contributed to symptoms. Able to void independently now. PVR<200  - Encourage patient to urinate, needs redirection    #Hypertension: patient was continued on home amlodipine 5 qd.  Plan:  - continue home amlodipine 5qd    #HLD (hyperlipidemia): Patient with history of hyperlipidemia, not currently on any medications.  Given patient's medication, no indication to start new statin medication    #GERD (gastroesophageal reflux disease): patient with history of GERD. Given Protonix 40qd, resolved.  Plan:  - Will not need prescription for protonix, OTC pepcid PRN.    #Sciatica: patient with history of sciatica, per daughter she frequently has back pain but not currently on any medications. It's possible back pain was contributing to behavioral disturbance but she was unable to express.    Plan:  - Tylenol 650mg PRN q6 for back pain, patient not complaining of pain    Patient was discharged to [facility]    New medications: seroquel 25mg AM, 50mg PM    Changes to old medications: Depakote sprinkles 250 AM 500mg PM changed to 250 BID    Medications that were stopped: STOP Lexapro 5mg qd, olanzapine 5mg qd    Items to Follow up as outpatient: none    Physical exam at time of discharge:     PHYSICAL EXAM:  General: AAOx1-2 person, sometimes place or time, NAD  Head: NC/AT; MMM; PERRL; EOMI;  Neck: Supple; no JVD  Respiratory: CTAB; no wheezes/rales/rhonchi  Cardiovascular: Regular rhythm/rate; S1/S2+, no murmurs, rubs gallops   Gastrointestinal: Soft; NTND; bowel sounds normal and present  Extremities: WWP; no edema/cyanosis  Neurological: Patient strength and sensation intact, EOMI intact  Skin: Clean and intact. Good skin turgor. Without open wounds and sores #Discharge: do not delete    Patient is 88 y/o female with PMH of HLD, GERD, Alzheimer's and possible frontotemporal dementia, and sciatica sent from assisted living for agitation and violent behavior.      Problem List/Main Diagnoses (system-based):     #Alzheimers disease with behavioral disturbances   Pt came in for acute agitation and history of violent behavior in setting of Alzheimers dementia towards staff at [facility]  Patient with Alzheimer's and possible frontotemporal dementia (no previous MRIs) presenting with worsening agitation, likely in setting of urinary retention, medication noncompliance, sciatica.  Home medications depakote sprinkles 250mg AM 500mg PM, melatonin 3mg, lexapro 5mg qd, olanzapine 5mg qd   - c/w depakote sprinkles 250 BID   - c/w seroquel 25mg AM 50mg PM, seroquel 25mg PO q6 PRN for break through agitation  - c/w melatonin 3mg  - holding home Lexapro 5mg qd, olanzapine 5mg qd  - PRN agitation: haldol 0.5 TID, quetiapine 12.5mg po Q6H      #Urinary retention: patient with urinary retention on admission which likely contributed to symptoms. Able to void independently now. PVR<200  - Encourage patient to urinate, needs redirection    #Hypertension: patient was continued on home amlodipine 5 qd.  Plan:  - continue home amlodipine 5qd    #HLD (hyperlipidemia): Patient with history of hyperlipidemia, not currently on any medications.  Given patient's medication, no indication to start new statin medication    #GERD (gastroesophageal reflux disease): patient with history of GERD. Given Protonix 40qd, resolved.  Plan:  - Will not need prescription for protonix, OTC pepcid PRN.    #Sciatica: patient with history of sciatica, per daughter she frequently has back pain but not currently on any medications. It's possible back pain was contributing to behavioral disturbance but she was unable to express.    Plan:  - Tylenol 650mg PRN q6 for back pain, patient not complaining of pain    Patient was discharged to [facility]    New medications: seroquel 25mg AM, 50mg PM    Changes to old medications: Depakote sprinkles 250 AM 500mg PM changed to 250 BID    Medications that were stopped: STOP Lexapro 5mg qd, olanzapine 5mg qd    Items to Follow up as outpatient: none    Physical exam at time of discharge:     PHYSICAL EXAM:  General: AAOx1-2 person, sometimes place or time, NAD  Head: NC/AT; MMM; PERRL; EOMI;  Neck: Supple; no JVD  Respiratory: CTAB; no wheezes/rales/rhonchi  Cardiovascular: Regular rhythm/rate; S1/S2+, no murmurs, rubs gallops   Gastrointestinal: Soft; NTND; bowel sounds normal and present  Extremities: WWP; no edema/cyanosis  Neurological: Patient strength and sensation intact, EOMI intact  Skin: Clean and intact. Good skin turgor. Without open wounds and sores     Patient is 86 y/o female with PMH of HLD, GERD, Alzheimer's and possible frontotemporal dementia, and sciatica sent from assisted living for agitation and violent behavior.      Problem List/Main Diagnoses (system-based):     #Alzheimers disease with behavioral disturbances   Pt came in for acute agitation and history of violent behavior in setting of Alzheimers dementia towards staff at [facility]  Patient with Alzheimer's and possible frontotemporal dementia (no previous MRIs) presenting with worsening agitation, likely in setting of urinary retention, medication noncompliance, sciatica.  Home medications depakote sprinkles 250mg AM 500mg PM, melatonin 3mg, lexapro 5mg qd, olanzapine 5mg qd   - c/w depakote sprinkles 250 BID   - c/w seroquel 50mg BID.   - c/w melatonin 3mg  - holding home Lexapro 5mg qd, olanzapine 5mg qd  - PRN agitation: haldol 0.5 TID, quetiapine 25mg po Q6H      #Urinary retention: patient with urinary retention on admission which likely contributed to symptoms. Able to void independently now. PVR<200  - Encourage patient to urinate, needs redirection    #Hypertension: patient was continued on home amlodipine 5 qd.  Plan:  - continue home amlodipine 5qd    #HLD (hyperlipidemia): Patient with history of hyperlipidemia, not currently on any medications.  Given patient's medication, no indication to start new statin medication    #GERD (gastroesophageal reflux disease): patient with history of GERD. Given Protonix 40qd, resolved.  Plan:  - Will not need prescription for protonix, OTC pepcid PRN.    #Sciatica: patient with history of sciatica, per daughter she frequently has back pain but not currently on any medications. It's possible back pain was contributing to behavioral disturbance but she was unable to express.    Plan:  - Tylenol 650mg PRN q6 for back pain, patient not complaining of pain    Patient was discharged to [facility]    New medications: seroquel 25mg AM, 50mg PM    Changes to old medications: Depakote sprinkles 250 AM 500mg PM changed to 250 BID    Medications that were stopped: STOP Lexapro 5mg qd, olanzapine 5mg qd    Items to Follow up as outpatient: none    Physical exam at time of discharge:     PHYSICAL EXAM:  General: AAOx1-2 person, sometimes place or time, NAD  Head: NC/AT; MMM; PERRL; EOMI;  Neck: Supple; no JVD  Respiratory: CTAB; no wheezes/rales/rhonchi  Cardiovascular: Regular rhythm/rate; S1/S2+, no murmurs, rubs gallops   Gastrointestinal: Soft; NTND; bowel sounds normal and present  Extremities: WWP; no edema/cyanosis  Neurological: Patient strength and sensation intact, EOMI intact  Skin: Clean and intact. Good skin turgor. Without open wounds and sores     Patient is 86 y/o female with PMH of HLD, GERD, Alzheimer's and possible frontotemporal dementia, and sciatica sent from assisted living for agitation and violent behavior.      Problem List/Main Diagnoses (system-based):     #Alzheimers disease with behavioral disturbances   Pt came in for acute agitation and history of violent behavior in setting of Alzheimers dementia towards staff at [facility]  Patient with Alzheimer's and possible frontotemporal dementia (no previous MRIs) presenting with worsening agitation, likely in setting of urinary retention, medication noncompliance, sciatica.  Home medications depakote sprinkles 250mg AM 500mg PM, melatonin 3mg, lexapro 5mg qd, olanzapine 5mg qd   - c/w depakote sprinkles 250 BID   - c/w seroquel 50mg BID.   - c/w melatonin 3mg  - holding home Lexapro 5mg qd, olanzapine 5mg qd  - PRN agitation: haldol 0.5 TID, quetiapine 25mg po Q6H      #Urinary retention: patient with urinary retention on admission which likely contributed to symptoms. Able to void independently now. PVR<200  - Encourage patient to urinate, needs redirection    #Hypertension: patient was continued on home amlodipine 5 qd.  Plan:  - continue home amlodipine 5qd    #HLD (hyperlipidemia): Patient with history of hyperlipidemia, not currently on any medications.  Given patient's medication, no indication to start new statin medication    #GERD (gastroesophageal reflux disease): patient with history of GERD. Given Protonix 40qd, resolved.  Plan:  - Will not need prescription for protonix, OTC pepcid PRN.    #Sciatica: patient with history of sciatica, per daughter she frequently has back pain but not currently on any medications. It's possible back pain was contributing to behavioral disturbance but she was unable to express.    Plan:  - Tylenol 650mg PRN q6 for back pain, patient not complaining of pain    Patient was discharged to [facility]    New medications: seroquel 25mg AM, 50mg PM    Changes to old medications: Depakote sprinkles 250 AM 500mg PM changed to 250 BID    Medications that were stopped: STOP Lexapro 5mg qd, olanzapine 5mg qd    Items to Follow up as outpatient: none    Physical exam at time of discharge:     PHYSICAL EXAM:  General: AAOx1-2 person, sometimes place or time, NAD  Head: NC/AT; MMM; PERRL; EOMI;  Neck: Supple; no JVD  Respiratory: CTAB; no wheezes/rales/rhonchi  Cardiovascular: Regular rhythm/rate; S1/S2+, no murmurs, rubs gallops   Gastrointestinal: Soft; NTND; bowel sounds normal and present  Extremities: WWP; no edema/cyanosis  Neurological: Patient strength and sensation intact, EOMI intact  Skin: Clean and intact. Good skin turgor. Without open wounds and sores     Patient is 86 y/o female with PMH of HLD, GERD, Alzheimer's and possible frontotemporal dementia, and sciatica sent from assisted living for agitation and violent behavior.      Problem List/Main Diagnoses (system-based):     #Alzheimers disease with behavioral disturbances   Pt came in for acute agitation and history of violent behavior in setting of Alzheimers dementia towards staff at [facility]  Patient with Alzheimer's and possible frontotemporal dementia (no previous MRIs) presenting with worsening agitation, likely in setting of urinary retention, medication noncompliance, sciatica.  Home medications depakote sprinkles 250mg AM 500mg PM, melatonin 3mg, lexapro 5mg qd, olanzapine 5mg qd   - c/w depakote sprinkles 250 BID   - c/w seroquel 50mg BID.   - c/w melatonin 3mg  - holding home Lexapro 5mg qd, olanzapine 5mg qd  - PRN agitation: haldol 0.5 TID, quetiapine 25mg po Q6H      #Urinary retention: patient with urinary retention on admission which likely contributed to symptoms. Able to void independently now. PVR<200  - Encourage patient to urinate, needs redirection    #Hypertension: patient was continued on home amlodipine 5 qd.  Plan:  - continue home amlodipine 5qd    #HLD (hyperlipidemia): Patient with history of hyperlipidemia, not currently on any medications.  Given patient's medication, no indication to start new statin medication    #GERD (gastroesophageal reflux disease): patient with history of GERD. Given Protonix 40qd, resolved.  Plan:  - Will not need prescription for protonix, OTC pepcid PRN.    #Sciatica: patient with history of sciatica, per daughter she frequently has back pain but not currently on any medications. It's possible back pain was contributing to behavioral disturbance but she was unable to express.    Plan:  - Tylenol 650mg PRN q6 for back pain, patient not complaining of pain    Patient was discharged to [facility]    New medications: seroquel 25mg AM, 50mg PM    Changes to old medications: Depakote sprinkles 250 AM 500mg PM changed to 250 BID    Medications that were stopped: STOP Lexapro 5mg qd, olanzapine 5mg qd    Items to Follow up as outpatient: none    Physical exam at time of discharge:     PHYSICAL EXAM:  General: AAOx1-2 person, sometimes place or time, NAD  Head: NC/AT; MMM; PERRL; EOMI;  Neck: Supple; no JVD  Respiratory: CTAB; no wheezes/rales/rhonchi  Cardiovascular: Regular rhythm/rate; S1/S2+, no murmurs, rubs gallops   Gastrointestinal: Soft; NTND; bowel sounds normal and present  Extremities: WWP; no edema/cyanosis  Neurological: Patient strength and sensation intact, EOMI intact  Skin: Clean and intact. Good skin turgor. Without open wounds and sores Patient is 88 y/o female with PMH of HLD, GERD, Alzheimer's and possible frontotemporal dementia, and sciatica sent from assisted living for agitation and violent behavior.      Problem List/Main Diagnoses (system-based):     #Alzheimers disease with behavioral disturbances   Pt came in for acute agitation and history of violent behavior in setting of Alzheimers dementia towards staff at [facility]  Patient with Alzheimer's and possible frontotemporal dementia (no previous MRIs) presenting with worsening agitation, likely in setting of urinary retention, medication noncompliance, sciatica.  Home medications depakote sprinkles 250mg AM 500mg PM, melatonin 3mg, lexapro 5mg qd, olanzapine 5mg qd   - c/w depakote sprinkles 250 BID   - c/w seroquel 50mg BID.   - c/w melatonin 3mg  - holding home Lexapro 5mg qd, olanzapine 5mg qd  - PRN agitation: haldol 0.5 TID, quetiapine 25mg po Q6H      #Urinary retention: patient with urinary retention on admission which likely contributed to symptoms. Able to void independently now. PVR<200  - Encourage patient to urinate, needs redirection    #Hypertension: patient was continued on home amlodipine 5 qd.  Plan:  - continue home amlodipine 5qd    #HLD (hyperlipidemia): Patient with history of hyperlipidemia, not currently on any medications.  Given patient's medication, no indication to start new statin medication    #GERD (gastroesophageal reflux disease): patient with history of GERD. Given Protonix 40qd, resolved.  Plan:  - Will not need prescription for protonix, OTC pepcid PRN.    #Sciatica: patient with history of sciatica, per daughter she frequently has back pain but not currently on any medications. It's possible back pain was contributing to behavioral disturbance but she was unable to express.    Plan:  - Tylenol 650mg PRN q6 for back pain, patient not complaining of pain    Patient was discharged to [facility]    New medications: seroquel 25mg AM, 50mg PM    Changes to old medications: Depakote sprinkles 250 AM 500mg PM changed to 250 BID    Medications that were stopped: STOP Lexapro 5mg qd, olanzapine 5mg qd    Items to Follow up as outpatient: none    Physical exam at time of discharge:     PHYSICAL EXAM:  General: AAOx1-2 person, sometimes place or time, NAD  Head: NC/AT; MMM; PERRL; EOMI;  Neck: Supple; no JVD  Respiratory: CTAB; no wheezes/rales/rhonchi  Cardiovascular: Regular rhythm/rate; S1/S2+, no murmurs, rubs gallops   Gastrointestinal: Soft; NTND; bowel sounds normal and present  Extremities: WWP; no edema/cyanosis  Neurological: Patient strength and sensation intact, EOMI intact  Skin: Clean and intact. Good skin turgor. Without open wounds and sores Patient is 86 y/o female with PMH of HLD, GERD, Alzheimer's and possible frontotemporal dementia, and sciatica sent from assisted living for agitation and violent behavior.      Problem List/Main Diagnoses (system-based):     #Alzheimers disease with behavioral disturbances   Pt came in for acute agitation and history of violent behavior in setting of Alzheimers dementia towards staff at [facility]  Patient with Alzheimer's and possible frontotemporal dementia (no previous MRIs) presenting with worsening agitation, likely in setting of urinary retention, medication noncompliance, sciatica.  Home medications depakote sprinkles 250mg AM 500mg PM, melatonin 3mg, lexapro 5mg qd, olanzapine 5mg qd   - c/w depakote sprinkles 250 BID   - c/w seroquel 50mg BID.   - c/w melatonin 3mg  - holding home Lexapro 5mg qd, olanzapine 5mg qd  - PRN agitation: haldol 0.5 TID, quetiapine 25mg po Q6H      #Urinary retention: patient with urinary retention on admission which likely contributed to symptoms. Able to void independently now. PVR<200  - Encourage patient to urinate, needs redirection    #Hypertension: patient was continued on home amlodipine 5 qd.  Plan:  - continue home amlodipine 5qd    #HLD (hyperlipidemia): Patient with history of hyperlipidemia, not currently on any medications.  Given patient's medication, no indication to start new statin medication    #GERD (gastroesophageal reflux disease): patient with history of GERD. Given Protonix 40qd, resolved.  Plan:  - Will not need prescription for protonix, OTC pepcid PRN.    #Sciatica: patient with history of sciatica, per daughter she frequently has back pain but not currently on any medications. It's possible back pain was contributing to behavioral disturbance but she was unable to express.    Plan:  - Tylenol 650mg PRN q6 for back pain, patient not complaining of pain    Patient was discharged to [facility]    New medications: seroquel 25mg AM, 50mg PM    Changes to old medications: Depakote sprinkles 250 AM 500mg PM changed to 250 BID    Medications that were stopped: STOP Lexapro 5mg qd, olanzapine 5mg qd    Items to Follow up as outpatient: none    Physical exam at time of discharge:     PHYSICAL EXAM:  General: AAOx1-2 person, sometimes place or time, NAD  Head: NC/AT; MMM; PERRL; EOMI;  Neck: Supple; no JVD  Respiratory: CTAB; no wheezes/rales/rhonchi  Cardiovascular: Regular rhythm/rate; S1/S2+, no murmurs, rubs gallops   Gastrointestinal: Soft; NTND; bowel sounds normal and present  Extremities: WWP; no edema/cyanosis  Neurological: Patient strength and sensation intact, EOMI intact  Skin: Clean and intact. Good skin turgor. Without open wounds and sores Patient is 88 y/o female with PMH of HLD, GERD, Alzheimer's and possible frontotemporal dementia, and sciatica sent from assisted living for agitation and violent behavior.      Problem List/Main Diagnoses (system-based):     #Alzheimers disease with behavioral disturbances   Pt came in for acute agitation and history of violent behavior in setting of Alzheimers dementia towards staff at [facility]  Patient with Alzheimer's and possible frontotemporal dementia (no previous MRIs) presenting with worsening agitation, likely in setting of urinary retention, medication noncompliance, sciatica.  Home medications depakote sprinkles 250mg AM 500mg PM, melatonin 3mg, lexapro 5mg qd, olanzapine 5mg qd   - c/w depakote sprinkles 250 BID   - c/w seroquel 50mg BID.   - c/w melatonin 3mg  - holding home Lexapro 5mg qd, olanzapine 5mg qd  - PRN agitation: haldol 0.5 TID, quetiapine 25mg po Q6H      #Urinary retention: patient with urinary retention on admission which likely contributed to symptoms. Able to void independently now. PVR<200  - Encourage patient to urinate, needs redirection    #Hypertension: patient was continued on home amlodipine 5 qd.  Plan:  - continue home amlodipine 5qd    #HLD (hyperlipidemia): Patient with history of hyperlipidemia, not currently on any medications.  Given patient's medication, no indication to start new statin medication    #GERD (gastroesophageal reflux disease): patient with history of GERD. Given Protonix 40qd, resolved.  Plan:  - Will not need prescription for protonix, OTC pepcid PRN.    #Sciatica: patient with history of sciatica, per daughter she frequently has back pain but not currently on any medications. It's possible back pain was contributing to behavioral disturbance but she was unable to express.    Plan:  - Tylenol 650mg PRN q6 for back pain, patient not complaining of pain    Patient was discharged to [facility]    New medications: seroquel 25mg AM, 50mg PM    Changes to old medications: Depakote sprinkles 250 AM 500mg PM changed to 250 BID    Medications that were stopped: STOP Lexapro 5mg qd, olanzapine 5mg qd    Items to Follow up as outpatient: none    Physical exam at time of discharge:     PHYSICAL EXAM:  General: AAOx1-2 person, sometimes place or time, NAD  Head: NC/AT; MMM; PERRL; EOMI;  Neck: Supple; no JVD  Respiratory: CTAB; no wheezes/rales/rhonchi  Cardiovascular: Regular rhythm/rate; S1/S2+, no murmurs, rubs gallops   Gastrointestinal: Soft; NTND; bowel sounds normal and present  Extremities: WWP; no edema/cyanosis  Neurological: Patient strength and sensation intact, EOMI intact  Skin: Clean and intact. Good skin turgor. Without open wounds and sores

## 2022-08-17 NOTE — BH CONSULTATION LIAISON PROGRESS NOTE - NSBHATTESTBILLINGAW_PSY_A_CORE
[id number] Inpatient care - high complexity - 35 minutes [id number] Inpatient care - high complexity - 35 minutes [id number] Inpatient care - high complexity - 35 minutes

## 2022-08-17 NOTE — DISCHARGE NOTE PROVIDER - ATTENDING DISCHARGE PHYSICAL EXAMINATION:
I have read and agree with the resident Discharge Note above.  Patient seen and discussed with resident team on the day of discharge.     Briefly, 88 y/o female PMH HLD, GERD, Alzheimer's and possible frontotemporal dementia, sciatica sent from assisted living for agitation    #Alzheimer's  #Frontotemporal dementia  Psychiatry following, appreciate recommendations for agitation in the setting of alzheimer's  - c/w seroquel 50mg PO BID, and 25mg PO q6 PRN, patient calm and stable on this regimen  - lexapro 5mg daily  - c/w minced and moist diet  - depakote converted to PO sprinkles, tolerating well  - PT eval recommends BEATRIZ  - frequent reorientation  - melatonin QHS    #Severe protein calorie malnutrition  - dietician jensen, add ensure      Attending exam on day of discharge:   Gen: NAD, calmly resting in bed, frail elderly female  HEENT: NCAT, MMM  Neck: supple  CV: RRR, no m/g/r appreciated  Pulm: CTA B, no w/r/r, normal work of breathing  Abd: +BS, soft, NTND  : deferred  Skin: no rashes, ulcers, jaundice; intact, warm and dry  Ext: WWP, no c/c/e  MSK: 5/5 strength, normal range of motion, no swollen or erythematous joints  Lymph: no postauricular, supraclavicular or submandibular LAD  Neuro: AOx1 self, does not follow commands, no change from baseline  Psych: calm    I was physically present for the evaluation and management services provided. I agree with the included history, physical, and plan which I reviewed and edited where appropriate. I spent < 30 minutes with the patient and the patient's family on direct patient care and discharge planning with more than 50% of the visit spent on counseling and/or coordination of care.     Levar Mcmillan  [phone] I have read and agree with the resident Discharge Note above.  Patient seen and discussed with resident team on the day of discharge.     Briefly, 88 y/o female PMH HLD, GERD, Alzheimer's and possible frontotemporal dementia, sciatica sent from assisted living for agitation    #Alzheimer's  #Frontotemporal dementia  Psychiatry following, appreciate recommendations for agitation in the setting of alzheimer's  - c/w seroquel 50mg PO BID, and 25mg PO q6 PRN, patient calm and stable on this regimen  - lexapro 5mg daily  - c/w minced and moist diet  - depakote converted to PO sprinkles, tolerating well  - PT eval recommends BEATRIZ  - frequent reorientation  - melatonin QHS    #Severe protein calorie malnutrition  - dietician jensen, add ensure      Attending exam on day of discharge:   Gen: NAD, calmly resting in bed, frail elderly female  HEENT: NCAT, MMM  Neck: supple  CV: RRR, no m/g/r appreciated  Pulm: CTA B, no w/r/r, normal work of breathing  Abd: +BS, soft, NTND  : deferred  Skin: no rashes, ulcers, jaundice; intact, warm and dry  Ext: WWP, no c/c/e  MSK: 5/5 strength, normal range of motion, no swollen or erythematous joints  Lymph: no postauricular, supraclavicular or submandibular LAD  Neuro: AOx1 self, does not follow commands, no change from baseline  Psych: calm    I was physically present for the evaluation and management services provided. I agree with the included history, physical, and plan which I reviewed and edited where appropriate. I spent < 30 minutes with the patient and the patient's family on direct patient care and discharge planning with more than 50% of the visit spent on counseling and/or coordination of care.     Levar Mcmillan  [phone] I have read and agree with the resident Discharge Note above.  Patient seen and discussed with resident team on the day of discharge.     Briefly, 88 y/o female PMH HLD, GERD, Alzheimer's and possible frontotemporal dementia, sciatica sent from assisted living for agitation    #Alzheimer's  #Frontotemporal dementia  Psychiatry following, appreciate recommendations for agitation in the setting of alzheimer's  - c/w seroquel 50mg PO BID, and 25mg PO q6 PRN, patient calm and stable on this regimen  - lexapro 5mg daily  - c/w minced and moist diet  - depakote converted to PO sprinkles, tolerating well  - PT eval recommends BEATRIZ  - frequent reorientation  - melatonin QHS    #Severe protein calorie malnutrition  - dietician jensen, add ensure      Attending exam on day of discharge:   Gen: NAD, calmly resting in bed, frail elderly female  HEENT: NCAT, MMM  Neck: supple  CV: RRR, no m/g/r appreciated  Pulm: CTA B, no w/r/r, normal work of breathing  Abd: +BS, soft, NTND  : deferred  Skin: no rashes, ulcers, jaundice; intact, warm and dry  Ext: WWP, no c/c/e  MSK: 5/5 strength, normal range of motion, no swollen or erythematous joints  Lymph: no postauricular, supraclavicular or submandibular LAD  Neuro: AOx1 self, does not follow commands, no change from baseline  Psych: calm    I was physically present for the evaluation and management services provided. I agree with the included history, physical, and plan which I reviewed and edited where appropriate. I spent < 30 minutes with the patient and the patient's family on direct patient care and discharge planning with more than 50% of the visit spent on counseling and/or coordination of care.     Levar Mcmillan  [phone]

## 2022-08-17 NOTE — PROGRESS NOTE ADULT - PROBLEM SELECTOR PLAN 2
Patient with Alzheimer's and possible frontotemporal dementia (no previous MRIs) presenting with worsening agitation, likely in setting of urinary retention, medication noncompliance, sciatica.      - plan as above  - patient too agitated for clinical evaluation Patient with Alzheimer's and possible frontotemporal dementia (no previous MRIs) presenting with worsening agitation, likely in setting of urinary retention, medication noncompliance, sciatica.      - plan as above
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
## 2022-08-17 NOTE — DISCHARGE NOTE PROVIDER - NSDCHHWEIGHT_GEN_ALL_CORE
Please come back to the emergency department if you develop chest pain, difficulty breathing, headache, lightheadedness, or you have any new or worsening symptoms that are concerning to you.  Take your blood pressure once a day when you are feeling relaxed most likely in the morning.  We have started you on a new medication for blood pressure.  If your blood pressure is low like less than 100 systolic for the top number then you should not take the medication.  If your heart rate is 60 then you should not take the medication.  Keep a record of your blood pressure once a day so you can bring this to your doctor on Friday.  We hope that you feel better!   Home Yes

## 2022-08-17 NOTE — BH CONSULTATION LIAISON PROGRESS NOTE - ORIENTATION
Able to say her name is "Cady" and knows she is in [place] otherwise does not realize she is in hospital or date. Able to say her name is "Cady" and knows she is in [place] otherwise does not realize she is in hospital or date. Able to say her name is "Cady" and knows she is in [place] otherwise does not realize she is in hospital or date. Able to say her name is "Cady", unable to state location, date, situation, or name of daughter present in room (stated "I love you" when asked daughter's name)

## 2022-08-17 NOTE — BH CONSULTATION LIAISON PROGRESS NOTE - OTHER
no focal deficits, not cooperative with language tasks (naming, repetition) lying back in bed, gait not assessed No clear response to internal stimuli confused content, misperceiving. no clear voiced delusions or paranoia, no SI or HI at times repeatedly grabbing at tongue sitting up in bed, gait not assessed confused content, answers questions with unrelated content. no clear voiced delusions or paranoia, no SI or HI
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
## 2022-08-17 NOTE — PROGRESS NOTE ADULT - SUBJECTIVE AND OBJECTIVE BOX
Medicine Progress Note (INCOMPLETE)    SUBJECTIVE / OVERNIGHT EVENTS:  O/N events:  Patient was seen and examined at bedside.  Otherwise negative ROS    MEDICATIONS  (STANDING):  amLODIPine   Tablet 5 milliGRAM(s) Oral every 24 hours  dextrose 5% + sodium chloride 0.45%. 1000 milliLiter(s) (75 mL/Hr) IV Continuous <Continuous>  enoxaparin Injectable 40 milliGRAM(s) SubCutaneous every 24 hours  escitalopram 5 milliGRAM(s) Oral every 24 hours  haloperidol    Injectable 1 milliGRAM(s) IntraMuscular two times a day  valproate sodium  IVPB 250 milliGRAM(s) IV Intermittent two times a day    MEDICATIONS  (PRN):  aluminum hydroxide/magnesium hydroxide/simethicone Suspension 30 milliLiter(s) Oral every 4 hours PRN Dyspepsia  melatonin 3 milliGRAM(s) Oral at bedtime PRN Insomnia    CAPILLARY BLOOD GLUCOSE            OBJECTIVE:  Vital Signs Last 24 Hrs  T(C): 36.7 (17 Aug 2022 09:59), Max: 37.1 (16 Aug 2022 22:55)  T(F): 98.1 (17 Aug 2022 09:59), Max: 98.8 (16 Aug 2022 22:55)  HR: 50 (17 Aug 2022 09:59) (50 - 92)  BP: 142/72 (17 Aug 2022 09:59) (127/62 - 149/69)  BP(mean): --  RR: 16 (17 Aug 2022 09:59) (16 - 19)  SpO2: 97% (17 Aug 2022 09:59) (96% - 99%)    Parameters below as of 17 Aug 2022 09:59  Patient On (Oxygen Delivery Method): room air        PHYSICAL EXAM:  General: AAOx3, NAD  Head: NC/AT; MMM; PERRL; EOMI;  Neck: Supple; no JVD  Respiratory: CTAB; no wheezes/rales/rhonchi  Cardiovascular: Regular rhythm/rate; S1/S2+, no murmurs, rubs gallops   Gastrointestinal: Soft; NTND; bowel sounds normal and present  Extremities: WWP; no edema/cyanosis  Neurological: CNII-XII grossly intact; no obvious focal deficits  Skin: Clean and intact. Good skin turgor. Without open wounds and sores  I&O's Summary      LABS:                        16.2   8.13  )-----------( 168      ( 17 Aug 2022 07:40 )             48.6     08-17    138  |  102  |  19  ----------------------------<  79  3.8   |  19<L>  |  0.86    Ca    9.4      17 Aug 2022 07:40  Phos  2.9     08-17  Mg     2.0     08-17    TPro  6.9  /  Alb  3.8  /  TBili  0.7  /  DBili  x   /  AST  80<H>  /  ALT  24  /  AlkPhos  52  08-17      CARDIAC MARKERS ( 15 Aug 2022 19:34 )  x     / x     / 442 U/L / x     / 10.8 ng/mL      Urinalysis Basic - ( 15 Aug 2022 22:03 )    Color: Yellow / Appearance: Clear / S.015 / pH: x  Gluc: x / Ketone: 15 mg/dL  / Bili: Negative / Urobili: 0.2 E.U./dL   Blood: x / Protein: NEGATIVE mg/dL / Nitrite: NEGATIVE   Leuk Esterase: NEGATIVE / RBC: 5-10 /HPF / WBC < 5 /HPF   Sq Epi: x / Non Sq Epi: 0-5 /HPF / Bacteria: Present /HPF        Urinalysis with Rflx Culture (collected 15 Aug 2022 22:03)          RADIOLOGY & ADDITIONAL TESTS:  Imaging from Last 24 Hours: Medicine Progress Note    SUBJECTIVE / OVERNIGHT EVENTS:  O/N events: No acute events   Patient was seen and examined at bedside, sleeping.   Otherwise negative ROS    MEDICATIONS  (STANDING):  amLODIPine   Tablet 5 milliGRAM(s) Oral every 24 hours  dextrose 5% + sodium chloride 0.45%. 1000 milliLiter(s) (75 mL/Hr) IV Continuous <Continuous>  enoxaparin Injectable 40 milliGRAM(s) SubCutaneous every 24 hours  escitalopram 5 milliGRAM(s) Oral every 24 hours  haloperidol    Injectable 1 milliGRAM(s) IntraMuscular two times a day  valproate sodium  IVPB 250 milliGRAM(s) IV Intermittent two times a day    MEDICATIONS  (PRN):  aluminum hydroxide/magnesium hydroxide/simethicone Suspension 30 milliLiter(s) Oral every 4 hours PRN Dyspepsia  melatonin 3 milliGRAM(s) Oral at bedtime PRN Insomnia    CAPILLARY BLOOD GLUCOSE            OBJECTIVE:  Vital Signs Last 24 Hrs  T(C): 36.7 (17 Aug 2022 09:59), Max: 37.1 (16 Aug 2022 22:55)  T(F): 98.1 (17 Aug 2022 09:59), Max: 98.8 (16 Aug 2022 22:55)  HR: 50 (17 Aug 2022 09:59) (50 - 92)  BP: 142/72 (17 Aug 2022 09:59) (127/62 - 149/69)  BP(mean): --  RR: 16 (17 Aug 2022 09:59) (16 - 19)  SpO2: 97% (17 Aug 2022 09:59) (96% - 99%)    Parameters below as of 17 Aug 2022 09:59  Patient On (Oxygen Delivery Method): room air        PHYSICAL EXAM:  eneral: Awake alert and oriented x1, NAD, non-cooperative   Head: NC/AT; MMM; PERRL; EOMI;  Neck: Supple; no JVD  Respiratory: CTAB; no wheezes/rales/rhonchi  Cardiovascular: Regular rhythm/rate; S1/S2+, no murmurs, rubs gallops   Gastrointestinal: Soft; NTND; bowel sounds normal and present  Extremities: WWP; no edema/cyanosis  Neurological: CNII-XII grossly intact; no obvious focal deficits  Skin: Clean and intact. Good skin turgor. Without open wounds and sores    I&O's Summary      LABS:                        16.2   8.13  )-----------( 168      ( 17 Aug 2022 07:40 )             48.6     08-17    138  |  102  |  19  ----------------------------<  79  3.8   |  19<L>  |  0.86    Ca    9.4      17 Aug 2022 07:40  Phos  2.9     08-17  Mg     2.0     08-17    TPro  6.9  /  Alb  3.8  /  TBili  0.7  /  DBili  x   /  AST  80<H>  /  ALT  24  /  AlkPhos  52  08-17      CARDIAC MARKERS ( 15 Aug 2022 19:34 )  x     / x     / 442 U/L / x     / 10.8 ng/mL      Urinalysis Basic - ( 15 Aug 2022 22:03 )    Color: Yellow / Appearance: Clear / S.015 / pH: x  Gluc: x / Ketone: 15 mg/dL  / Bili: Negative / Urobili: 0.2 E.U./dL   Blood: x / Protein: NEGATIVE mg/dL / Nitrite: NEGATIVE   Leuk Esterase: NEGATIVE / RBC: 5-10 /HPF / WBC < 5 /HPF   Sq Epi: x / Non Sq Epi: 0-5 /HPF / Bacteria: Present /HPF        Urinalysis with Rflx Culture (collected 15 Aug 2022 22:03)          RADIOLOGY & ADDITIONAL TESTS:  Imaging from Last 24 Hours:

## 2022-08-17 NOTE — DISCHARGE NOTE PROVIDER - NSDCCPCAREPLAN_GEN_ALL_CORE_FT
PRINCIPAL DISCHARGE DIAGNOSIS  Diagnosis: Dementia  Assessment and Plan of Treatment: Dementia is the general term for a group of brain disorders that cause memory problems and make it hard to think clearly (figure 1).  The symptoms of dementia often start off very mild and get worse slowly. Symptoms can include forgetfulness, acting confused or disoriented, trouble with speech and writing (for example, not being able to find the right words for things), trouble concentrating and reasoning, problems with tasks such as paying bills or balancing a checkbook.  As dementia gets worse, people might have episodes of anger or aggression, see things that aren't there or believe things that aren't true, be unable to eat, bathe, dress, or do other everyday tasks and lose bladder and bowel control.  It is important that you are discharged to a safe setting where you can receive the assistance you need to complete daily tasks.         PRINCIPAL DISCHARGE DIAGNOSIS  Diagnosis: Dementia  Assessment and Plan of Treatment: Dementia is the general term for a group of brain disorders that cause memory problems and make it hard to think clearly (figure 1).  The symptoms of dementia often start off very mild and get worse slowly. Symptoms can include forgetfulness, acting confused or disoriented, trouble with speech and writing (for example, not being able to find the right words for things), trouble concentrating and reasoning, problems with tasks such as paying bills or balancing a checkbook.  As dementia gets worse, people might have episodes of anger or aggression, see things that aren't there or believe things that aren't true, be unable to eat, bathe, dress, or do other everyday tasks and lose bladder and bowel control.  It is important that you are discharged to a safe setting where you can receive the assistance you need to complete daily tasks.  You take the medications,          PRINCIPAL DISCHARGE DIAGNOSIS  Diagnosis: Dementia  Assessment and Plan of Treatment: Dementia is the general term for a group of brain disorders that cause memory problems and make it hard to think clearly (figure 1).  The symptoms of dementia often start off very mild and get worse slowly. Symptoms can include forgetfulness, acting confused or disoriented, trouble with speech and writing (for example, not being able to find the right words for things), trouble concentrating and reasoning, problems with tasks such as paying bills or balancing a checkbook.  As dementia gets worse, people might have episodes of anger or aggression, see things that aren't there or believe things that aren't true, be unable to eat, bathe, dress, or do other everyday tasks and lose bladder and bowel control.  It is important that you are discharged to a safe setting where you can receive the assistance you need to complete daily tasks.  You take the medications, seroquel 50mg twice a day, take it in the AM and in PM. You also take depakote 125 mg two pills, twice a day. Please continue taking this medications.  Follow up with an outpatient psychiatrist 1 month after discharge.        SECONDARY DISCHARGE DIAGNOSES  Diagnosis: Sciatica  Assessment and Plan of Treatment: You have a history of sciatica. Sciatica refers to pain that radiates along the path of the sciatic nerve, which branches from your lower back through your hips and buttocks and down each leg. Typically, sciatica affects only one side of your body.  Sciatica most commonly occurs when a herniated disk, bone spur on the spine or narrowing of the spine (spinal stenosis) compresses part of the nerve. This causes inflammation, pain and often some numbness in the affected leg.  Sometimes pain can affect your mental status and cause you to be confused or agitated. Please take over the counter tylenol 650mg four times a day if pain is persistent. Follow up with your outpatient PCP in 1 month following discharge to discuss your sciatica.    Diagnosis: Urinary retention  Assessment and Plan of Treatment: Urinary retention is a condition in which you cannot empty all the urine from your bladder. Urinary retention can be acute—a sudden inability to urinate, or chronic—a gradual inability to completely empty the bladder of urine. You were found to be retaining urine during your hospital stay.  You were able to urinate freely for several days, and there is not structural issues cauing urination difficulties. Please remember to urinate and maintain good genitourinary hygiene.  Follow up with your outpatient PCP in 1 month to discuss urinary retention.

## 2022-08-17 NOTE — BH CONSULTATION LIAISON PROGRESS NOTE - NSBHADMITCOUNSELOTHER_PSY_A_CORE FT
discussion with daughter Contance about recent decline in cognition since June, functioning at SNF  discussion with primary team about management of agitation iso NPO status, disposition planning

## 2022-08-17 NOTE — PROGRESS NOTE ADULT - ATTENDING COMMENTS
88 y/o female PMH HLD, GERD, Alzheimer's and possible frontotemporal dementia, sciatica sent from assisted living for agitation    Patient calm, more sedated this AM after receiving haldol     Psychiatry following, appreciate recommendations for agitation in the setting of alzheimers  - will decrease haldol regimen to 0.5 TID so patient is less sedated  - f/u speech and swallow eval when patient less sedated to see if can tolerate PO intake, has not passed dysphagia screen at this time as she was delirious yesterday and too sedated today.  will reassess  - PT eval    Remainder of plan as above 86 y/o female PMH HLD, GERD, Alzheimer's and possible frontotemporal dementia, sciatica sent from assisted living for agitation    Patient calm, more sedated this AM after receiving haldol     Psychiatry following, appreciate recommendations for agitation in the setting of alzheimers  - will decrease haldol regimen to 0.5 TID so patient is less sedated  - f/u speech and swallow eval when patient less sedated to see if can tolerate PO intake, has not passed dysphagia screen at this time as she was delirious yesterday and too sedated today.  will reassess  - PT eval    Remainder of plan as above

## 2022-08-18 DIAGNOSIS — R63.8 OTHER SYMPTOMS AND SIGNS CONCERNING FOOD AND FLUID INTAKE: ICD-10-CM

## 2022-08-18 PROCEDURE — 99231 SBSQ HOSP IP/OBS SF/LOW 25: CPT

## 2022-08-18 PROCEDURE — 99232 SBSQ HOSP IP/OBS MODERATE 35: CPT | Mod: GC

## 2022-08-18 RX ORDER — AMLODIPINE BESYLATE 2.5 MG/1
5 TABLET ORAL ONCE
Refills: 0 | Status: COMPLETED | OUTPATIENT
Start: 2022-08-18 | End: 2022-08-18

## 2022-08-18 RX ORDER — QUETIAPINE FUMARATE 200 MG/1
50 TABLET, FILM COATED ORAL
Refills: 0 | Status: DISCONTINUED | OUTPATIENT
Start: 2022-08-18 | End: 2022-08-20

## 2022-08-18 RX ORDER — IRON SUCROSE 20 MG/ML
200 INJECTION, SOLUTION INTRAVENOUS ONCE
Refills: 0 | Status: COMPLETED | OUTPATIENT
Start: 2022-08-19 | End: 2022-08-19

## 2022-08-18 RX ORDER — HALOPERIDOL DECANOATE 100 MG/ML
0.5 INJECTION INTRAMUSCULAR ONCE
Refills: 0 | Status: COMPLETED | OUTPATIENT
Start: 2022-08-18 | End: 2022-08-18

## 2022-08-18 RX ADMIN — AMLODIPINE BESYLATE 5 MILLIGRAM(S): 2.5 TABLET ORAL at 16:56

## 2022-08-18 RX ADMIN — ENOXAPARIN SODIUM 40 MILLIGRAM(S): 100 INJECTION SUBCUTANEOUS at 10:33

## 2022-08-18 RX ADMIN — HALOPERIDOL DECANOATE 0.5 MILLIGRAM(S): 100 INJECTION INTRAMUSCULAR at 12:18

## 2022-08-18 RX ADMIN — HALOPERIDOL DECANOATE 0.5 MILLIGRAM(S): 100 INJECTION INTRAMUSCULAR at 06:18

## 2022-08-18 RX ADMIN — HALOPERIDOL DECANOATE 0.5 MILLIGRAM(S): 100 INJECTION INTRAMUSCULAR at 14:12

## 2022-08-18 RX ADMIN — QUETIAPINE FUMARATE 50 MILLIGRAM(S): 200 TABLET, FILM COATED ORAL at 16:56

## 2022-08-18 RX ADMIN — Medication 52.5 MILLIGRAM(S): at 07:37

## 2022-08-18 RX ADMIN — SODIUM CHLORIDE 75 MILLILITER(S): 9 INJECTION, SOLUTION INTRAVENOUS at 02:06

## 2022-08-18 RX ADMIN — HALOPERIDOL DECANOATE 0.5 MILLIGRAM(S): 100 INJECTION INTRAMUSCULAR at 23:12

## 2022-08-18 NOTE — PROGRESS NOTE ADULT - PROBLEM SELECTOR PLAN 8
F: none  E: replete PRN  N: regular diet pending passing dysphagia screen   GI: none  DVT: Lovenox  Code: Full  Dispo: [facility] Patient with history of sciatica, per daughter she frequently has back pain but not currently on any medications.  It's possibly back pain was contributing to behavioral disturbance but she was unable to express.    - If patient remains agitated after resolution of urinary retention, will try Toradol IV Patient with history of sciatica, per daughter she frequently has back pain but not currently on any medications.  It's possibly back pain was contributing to behavioral disturbance but she was unable to express.      - If patient remains agitated after resolution of urinary retention, will try Toradol IV

## 2022-08-18 NOTE — PROGRESS NOTE ADULT - SUBJECTIVE AND OBJECTIVE BOX
Medicine Progress Note (INCOMPLETE)    SUBJECTIVE / OVERNIGHT EVENTS:  O/N events:  Patient was seen and examined at bedside.  Otherwise negative ROS    MEDICATIONS  (STANDING):  amLODIPine   Tablet 5 milliGRAM(s) Oral every 24 hours  dextrose 5% + sodium chloride 0.45%. 1000 milliLiter(s) (75 mL/Hr) IV Continuous <Continuous>  enoxaparin Injectable 40 milliGRAM(s) SubCutaneous every 24 hours  escitalopram 5 milliGRAM(s) Oral every 24 hours  haloperidol    Injectable 0.5 milliGRAM(s) IntraMuscular every 8 hours  valproate sodium  IVPB 250 milliGRAM(s) IV Intermittent two times a day    MEDICATIONS  (PRN):  aluminum hydroxide/magnesium hydroxide/simethicone Suspension 30 milliLiter(s) Oral every 4 hours PRN Dyspepsia  melatonin 3 milliGRAM(s) Oral at bedtime PRN Insomnia    CAPILLARY BLOOD GLUCOSE            OBJECTIVE:  Vital Signs Last 24 Hrs  T(C): 36.4 (17 Aug 2022 12:46), Max: 36.7 (17 Aug 2022 09:59)  T(F): 97.5 (17 Aug 2022 12:46), Max: 98.1 (17 Aug 2022 09:59)  HR: 108 (17 Aug 2022 12:46) (50 - 108)  BP: 109/69 (17 Aug 2022 12:46) (109/69 - 142/72)  BP(mean): --  RR: 17 (17 Aug 2022 12:46) (16 - 17)  SpO2: 96% (17 Aug 2022 12:46) (96% - 97%)    Parameters below as of 17 Aug 2022 12:46  Patient On (Oxygen Delivery Method): room air        General: Awake alert and oriented x1, NAD, non-cooperative   Head: NC/AT; MMM; PERRL; EOMI;  Neck: Supple; no JVD  Respiratory: CTAB; no wheezes/rales/rhonchi  Cardiovascular: Regular rhythm/rate; S1/S2+, no murmurs, rubs gallops   Gastrointestinal: Soft; NTND; bowel sounds normal and present  Extremities: WWP; no edema/cyanosis  Neurological: CNII-XII grossly intact; no obvious focal deficits  Skin: Clean and intact. Good skin turgor. Without open wounds and sores    I&O's Summary      LABS:             Pt refused labs this AM              Urinalysis with Rflx Culture (collected 15 Aug 2022 22:03)          RADIOLOGY & ADDITIONAL TESTS:  Imaging from Last 24 Hours: Medicine Progress Note     SUBJECTIVE / OVERNIGHT EVENTS:  O/N events: No acute events overnight  Patient was seen and examined at bedside. She did not want to be examined.   Otherwise negative ROS    MEDICATIONS  (STANDING):  amLODIPine   Tablet 5 milliGRAM(s) Oral every 24 hours  dextrose 5% + sodium chloride 0.45%. 1000 milliLiter(s) (75 mL/Hr) IV Continuous <Continuous>  enoxaparin Injectable 40 milliGRAM(s) SubCutaneous every 24 hours  escitalopram 5 milliGRAM(s) Oral every 24 hours  haloperidol    Injectable 0.5 milliGRAM(s) IntraMuscular every 8 hours  valproate sodium  IVPB 250 milliGRAM(s) IV Intermittent two times a day    MEDICATIONS  (PRN):  aluminum hydroxide/magnesium hydroxide/simethicone Suspension 30 milliLiter(s) Oral every 4 hours PRN Dyspepsia  melatonin 3 milliGRAM(s) Oral at bedtime PRN Insomnia    CAPILLARY BLOOD GLUCOSE            OBJECTIVE:  Vital Signs Last 24 Hrs  T(C): 36.4 (17 Aug 2022 12:46), Max: 36.7 (17 Aug 2022 09:59)  T(F): 97.5 (17 Aug 2022 12:46), Max: 98.1 (17 Aug 2022 09:59)  HR: 108 (17 Aug 2022 12:46) (50 - 108)  BP: 109/69 (17 Aug 2022 12:46) (109/69 - 142/72)  BP(mean): --  RR: 17 (17 Aug 2022 12:46) (16 - 17)  SpO2: 96% (17 Aug 2022 12:46) (96% - 97%)    Parameters below as of 17 Aug 2022 12:46  Patient On (Oxygen Delivery Method): room air        General: Awake alert and oriented x1, NAD, non-cooperative   Head: NC/AT; MMM; PERRL; EOMI;  Neck: Supple; no JVD  Respiratory: CTAB; no wheezes/rales/rhonchi  Cardiovascular: Regular rhythm/rate; S1/S2+, no murmurs, rubs gallops   Gastrointestinal: Soft; NTND; bowel sounds normal and present  Extremities: WWP; no edema/cyanosis  Neurological: CNII-XII grossly intact; no obvious focal deficits  Skin: Clean and intact. Good skin turgor. Without open wounds and sores    I&O's Summary      LABS:             Pt refused labs this AM              Urinalysis with Rflx Culture (collected 15 Aug 2022 22:03)          RADIOLOGY & ADDITIONAL TESTS:  Imaging from Last 24 Hours: Medicine Progress Note     SUBJECTIVE / OVERNIGHT EVENTS:  O/N events: No acute events overnight  Patient was seen at bedside lying in bed with eyes closed. Declined physical examination. Reported she did not want to eat and participate in dysphagia screen. Not endorsing pain. Awake, alert and oriented to person and place.  Otherwise negative ROS    MEDICATIONS  (STANDING):  amLODIPine   Tablet 5 milliGRAM(s) Oral every 24 hours  dextrose 5% + sodium chloride 0.45%. 1000 milliLiter(s) (75 mL/Hr) IV Continuous <Continuous>  enoxaparin Injectable 40 milliGRAM(s) SubCutaneous every 24 hours  escitalopram 5 milliGRAM(s) Oral every 24 hours  haloperidol    Injectable 0.5 milliGRAM(s) IntraMuscular every 8 hours  valproate sodium  IVPB 250 milliGRAM(s) IV Intermittent two times a day    MEDICATIONS  (PRN):  aluminum hydroxide/magnesium hydroxide/simethicone Suspension 30 milliLiter(s) Oral every 4 hours PRN Dyspepsia  melatonin 3 milliGRAM(s) Oral at bedtime PRN Insomnia    CAPILLARY BLOOD GLUCOSE            OBJECTIVE:  Vital Signs Last 24 Hrs  T(C): 36.4 (17 Aug 2022 12:46), Max: 36.7 (17 Aug 2022 09:59)  T(F): 97.5 (17 Aug 2022 12:46), Max: 98.1 (17 Aug 2022 09:59)  HR: 108 (17 Aug 2022 12:46) (50 - 108)  BP: 109/69 (17 Aug 2022 12:46) (109/69 - 142/72)  BP(mean): --  RR: 17 (17 Aug 2022 12:46) (16 - 17)  SpO2: 96% (17 Aug 2022 12:46) (96% - 97%)    Parameters below as of 17 Aug 2022 12:46  Patient On (Oxygen Delivery Method): room air        General: Awake alert and oriented x2, NAD  Head: NC/AT; MMM; PERRL; EOMI;  Neck: Supple; no JVD  Respiratory: CTAB; no wheezes/rales/rhonchi  Cardiovascular: Regular rhythm/rate; S1/S2+, no murmurs, rubs gallops   Gastrointestinal: Soft; NTND; bowel sounds normal and present  Extremities: WWP; no edema/cyanosis  Neurological: CNII-XII grossly intact; no obvious focal deficits  Skin: Clean and intact. Good skin turgor. Without open wounds and sores    I&O's Summary      LABS:             Pt refused labs this AM              Urinalysis with Rflx Culture (collected 15 Aug 2022 22:03)          RADIOLOGY & ADDITIONAL TESTS:  Imaging from Last 24 Hours:

## 2022-08-18 NOTE — PROGRESS NOTE ADULT - PROBLEM SELECTOR PLAN 7
Patient with history of sciatica, per daughter she frequently has back pain but not currently on any medications.  It's possibly back pain was contributing to behavioral disturbance but she was unable to express.    - If patient remains agitated after resolution of urinary retention, will try Toradol IV Patient with history of GERD, currently not on any medication.      - can start pantoprazole if having symptoms, Patient with history of GERD, currently not on any medication.      - IV protonix transition to PO when possible

## 2022-08-18 NOTE — PROGRESS NOTE ADULT - PROBLEM SELECTOR PLAN 5
Patient with history of hyperlipidemia, not currently on any medications.    - Can consider lipid panel once patient less agitated BP stable    - restart amlodipine 5 qd when needed BP has been wnl. Holding home amlodipine 5    - restart amlodipine 5 qd when able to tolerate PO

## 2022-08-18 NOTE — PROGRESS NOTE ADULT - PROBLEM SELECTOR PLAN 6
Patient with history of GERD, currently not on any medication.      - can start pantoprazole if having symptoms, Patient with history of hyperlipidemia, not currently on any medications.    - Can consider lipid panel once patient less agitated Patient with history of hyperlipidemia, not currently on any medications.      - Can consider lipid panel

## 2022-08-18 NOTE — BH CONSULTATION LIAISON PROGRESS NOTE - OTHER
confused content, answers questions with unrelated content. no clear voiced delusions or paranoia, no SI or HI no focal deficits, not cooperative with language tasks (naming, repetition) No clear response to internal stimuli sitting up in bed, gait not assessed calm, asleep recently impaired lying back in bed, gait not assessed asleep
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
## 2022-08-18 NOTE — BH CONSULTATION LIAISON PROGRESS NOTE - ORIENTATION
Able to say her name is "Cady", unable to state location, date, situation, or name of daughter present in room (stated "I love you" when asked daughter's name)

## 2022-08-18 NOTE — PROGRESS NOTE ADULT - PROBLEM SELECTOR PLAN 3
Dr. Arevalo Patient with urinary retention no admission which likely contributed to symptoms. Etiology unclear, may be in setting of detrusor underactivity vs medication side effect.   - last PVR 96    - S/c q6 hrs and if no improvement consult urology Pt able to swallow apple sauce. Declined speech and swallow    Plan  - comfort feeds

## 2022-08-18 NOTE — PROGRESS NOTE ADULT - ASSESSMENT
86 y/o female PMH HLD, GERD, Alzheimer's and possible frontotemporal dementia, sciatica sent from assisted living for agitation.

## 2022-08-18 NOTE — BH CONSULTATION LIAISON PROGRESS NOTE - NSBHATTESTBILLINGAW_PSY_A_CORE
[id number] Inpatient care - low complexity - 15 minutes [id number] Inpatient care - low complexity - 15 minutes [id number] Inpatient care - low complexity - 15 minutes

## 2022-08-18 NOTE — PROGRESS NOTE ADULT - PROBLEM SELECTOR PLAN 1
Pt came in for acute agitation at [facility].  - unable to obtain EKG patient pulling off leads    Plan  - c/w IV valproate 250 BID  - d/c IM haldol 1mg IM BID to IM haldol 0.5 TID  - holding home Lexapro 5mg qd, olanzapine 5mg qd  - Monitor for any adverse response to antipsychotic given question of dystonic reaction with use of multiple doses of PRN olanzapine in ED.  - Obtain EKG to monitor QTc  - c/w melatonin 3mg Pt came in for acute agitation at [facility].  - unable to obtain EKG patient pulling off leads    Plan  - c/w IV valproate 250 BID  - d/c IM haldol 1mg IM BID to IM haldol 0.5 TID  - holding home Lexapro 5mg qd, olanzapine 5mg qd  - Monitor for any adverse response to antipsychotic given question of dystonic reaction with use of multiple doses of PRN olanzapine in ED.  - Obtain EKG to monitor QTc  - c/w melatonin 3mg Pt came in for acute agitation at [facility].  - unable to obtain EKG patient pulling off leads    Plan  - c/w IV valproate 250 BID  - d/c IM haldol 1mg IM BID to IM haldol 0.5 TID  - holding home Lexapro 5mg qd, olanzapine 5mg qd  - Monitor for any adverse response to antipsychotic given question of dystonic reaction with use of multiple doses of PRN olanzapine in ED.  - Obtain EKG to monitor QTc  - c/w melatonin 3mg Pt came in for acute agitation at [facility].   - unable to obtain EKG patient pulling off leads    Plan  - c/w IV valproate 250 BID  - d/c IM haldol 1mg IM BID to IM haldol 0.5 TID  - holding home Lexapro 5mg qd, olanzapine 5mg qd  - Monitor for any adverse response to antipsychotic given question of dystonic reaction with use of multiple doses of PRN olanzapine in ED.  - Obtain EKG to monitor QTc  - c/w melatonin 3mg Pt came in for acute agitation at [facility].   - unable to obtain EKG patient pulling off leads    Plan  - c/w IV valproate 250 BID  - d/c IM haldol 1mg IM BID to IM haldol 0.5 TID  - holding home Lexapro 5mg qd, olanzapine 5mg qd  - Monitor for any adverse response to antipsychotic given question of dystonic reaction with use of multiple doses of PRN olanzapine in ED.  - Obtain EKG to monitor QTc  - c/w melatonin 3mg Pt came in for acute agitation at [facility].   - unable to obtain EKG patient pulling off leads    Plan  - c/w IV valproate 250 BID  - d/c IM haldol 1mg IM BID to IM haldol 0.5 TID  - holding home Lexapro 5mg qd, olanzapine 5mg qd  - Monitor for any adverse response to antipsychotic given question of dystonic reaction with use of multiple doses of PRN olanzapine in ED.  - Obtain EKG to monitor QTc  - c/w melatonin 3mg

## 2022-08-18 NOTE — BH CONSULTATION LIAISON PROGRESS NOTE - NSBHFUPINTERVALHXFT_PSY_A_CORE
The patient was seen and examined at bedside. On initial evaluation she is sleeping comfortably in bed. Per PCA, she became agitated when waking but did not require any PRNs.

## 2022-08-18 NOTE — PROGRESS NOTE ADULT - PROBLEM SELECTOR PLAN 2
Patient with Alzheimer's and possible frontotemporal dementia (no previous MRIs) presenting with worsening agitation, likely in setting of urinary retention, medication noncompliance, sciatica.      - plan as above

## 2022-08-18 NOTE — PROGRESS NOTE ADULT - ATTENDING COMMENTS
88 y/o female PMH HLD, GERD, Alzheimer's and possible frontotemporal dementia, sciatica sent from assisted living for agitation    Patient calm, resting comfortably upon asessment    #Alzheimer's with agitation  Psychiatry following, appreciate recommendations for agitation in the setting of alzheimers  - c/w decrease haldol regimen to 0.5 TID so patient is less sedated  - f/u speech and swallow eval when daughter present today, patient more alert today and likely able to participate, will need to demonstrate ability to take PO for possible [facility] admission  - D5/NS until can tolerate PO intake  - c/w depakote IV until tolerates PO  - PT eval    Remainder of plan as above    Dispo: likely transfer to [facility] 86 y/o female PMH HLD, GERD, Alzheimer's and possible frontotemporal dementia, sciatica sent from assisted living for agitation    Patient calm, resting comfortably upon asessment    #Alzheimer's with agitation  Psychiatry following, appreciate recommendations for agitation in the setting of alzheimers  - c/w decrease haldol regimen to 0.5 TID so patient is less sedated  - f/u speech and swallow eval when daughter present today, patient more alert today and likely able to participate, will need to demonstrate ability to take PO for possible [facility] admission  - D5/NS until can tolerate PO intake  - c/w depakote IV until tolerates PO  - PT eval    Remainder of plan as above    Dispo: likely transfer to [facility]

## 2022-08-18 NOTE — PROGRESS NOTE ADULT - PROBLEM SELECTOR PLAN 9
F: none  E: replete PRN  N: regular diet pending passing dysphagia screen   GI: none  DVT: Lovenox  Code: Full  Dispo: [facility] F: none  E: replete PRN  N: regular diet pending passing dysphagia screen   GI: IV protonix   DVT: Lovenox  Code: Full  Dispo: [facility]
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
## 2022-08-18 NOTE — PROGRESS NOTE ADULT - PROBLEM SELECTOR PLAN 4
BP stable    - restart amlodipine 5 qd when needed Patient with urinary retention no admission which likely contributed to symptoms. Etiology unclear, may be in setting of detrusor underactivity vs medication side effect.   - last PVR 96    - S/c q6 hrs and if no improvement consult urology

## 2022-08-19 DIAGNOSIS — R53.81 OTHER MALAISE: ICD-10-CM

## 2022-08-19 DIAGNOSIS — R13.10 DYSPHAGIA, UNSPECIFIED: ICD-10-CM

## 2022-08-19 PROCEDURE — 99232 SBSQ HOSP IP/OBS MODERATE 35: CPT | Mod: GC

## 2022-08-19 PROCEDURE — 99231 SBSQ HOSP IP/OBS SF/LOW 25: CPT

## 2022-08-19 PROCEDURE — 99497 ADVNCD CARE PLAN 30 MIN: CPT | Mod: 25

## 2022-08-19 PROCEDURE — 99233 SBSQ HOSP IP/OBS HIGH 50: CPT

## 2022-08-19 RX ORDER — PANTOPRAZOLE SODIUM 20 MG/1
40 TABLET, DELAYED RELEASE ORAL
Refills: 0 | Status: DISCONTINUED | OUTPATIENT
Start: 2022-08-19 | End: 2022-08-26

## 2022-08-19 RX ORDER — HALOPERIDOL DECANOATE 100 MG/ML
0.5 INJECTION INTRAMUSCULAR EVERY 8 HOURS
Refills: 0 | Status: DISCONTINUED | OUTPATIENT
Start: 2022-08-19 | End: 2022-08-20

## 2022-08-19 RX ORDER — HALOPERIDOL DECANOATE 100 MG/ML
0.5 INJECTION INTRAMUSCULAR ONCE
Refills: 0 | Status: COMPLETED | OUTPATIENT
Start: 2022-08-19 | End: 2022-08-19

## 2022-08-19 RX ORDER — DIVALPROEX SODIUM 500 MG/1
250 TABLET, DELAYED RELEASE ORAL EVERY 12 HOURS
Refills: 0 | Status: DISCONTINUED | OUTPATIENT
Start: 2022-08-19 | End: 2022-08-26

## 2022-08-19 RX ADMIN — ESCITALOPRAM OXALATE 5 MILLIGRAM(S): 10 TABLET, FILM COATED ORAL at 10:55

## 2022-08-19 RX ADMIN — QUETIAPINE FUMARATE 50 MILLIGRAM(S): 200 TABLET, FILM COATED ORAL at 23:13

## 2022-08-19 RX ADMIN — QUETIAPINE FUMARATE 50 MILLIGRAM(S): 200 TABLET, FILM COATED ORAL at 10:56

## 2022-08-19 RX ADMIN — HALOPERIDOL DECANOATE 0.5 MILLIGRAM(S): 100 INJECTION INTRAMUSCULAR at 19:30

## 2022-08-19 RX ADMIN — DIVALPROEX SODIUM 250 MILLIGRAM(S): 500 TABLET, DELAYED RELEASE ORAL at 23:13

## 2022-08-19 RX ADMIN — HALOPERIDOL DECANOATE 0.5 MILLIGRAM(S): 100 INJECTION INTRAMUSCULAR at 13:07

## 2022-08-19 RX ADMIN — DIVALPROEX SODIUM 250 MILLIGRAM(S): 500 TABLET, DELAYED RELEASE ORAL at 10:55
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
## 2022-08-19 NOTE — PROGRESS NOTE ADULT - PROBLEM SELECTOR PLAN 5
Spoke with daughter Srideiv Barnard (also HCP). GOC as above.  - HCP: Sridevi [place], [phone] (HCP form in [employer]).  - DNR/DNI Spoke with daughter Sridevi Barnard (also HCP). GOC as above.  - HCP: Sridevi [place], [phone] (HCP form in [employer]).  - DNR/DNI Spoke with daughter Sridevi Barnard (also HCP). GOC as above.  - HCP: Sridevi [place], [phone] (HCP form in [employer]).  - DNR/DNI

## 2022-08-19 NOTE — DIETITIAN INITIAL EVALUATION ADULT - OTHER INFO
88 y/o female PMH HLD, GERD, Alzheimer's and possible frontotemporal dementia, sciatica sent from assisted living for agitation and violent behavior likely due to urinary retention, medication changes, and possibly sciatica    Pt seen on 4UR at bedside; pt was asleep, in and out of sleep per aid, RD obtained majority of information from daughter. Pt stated UBW ~ pounds, which is/is not consistent with wt upon admission (~ pounds). Appetite currently poor; PTA, appetite was poor per daughter. As per diet/24h recall, PTA pt consumed meals per day, which consisted of coffee and Czech, cheeseburgers, salmon, grilled chicken, potatoes; pt is picky per daughter; used to eat 3 meals/day, currently eats ~2 small meals/day. During current admission, consumption of ~0-50% meals on average r/t past failed dysphagia screen and recent diet order initiation. GI: s/s blank noted. Fredi: 16. Skin integrity: abrasions to R knee, R elbow, R thigh. No edema noted. I/O: 0/901 (-901). Denies pain/discomfort. NKFA. Pt's daughter endorsed concerns/issues chewing/swallowing; full dysphagia screening could not be completed per SLP eval/documentation. Pertinent lab values: elevated H&H, AST, POCT BG; will monitor. Pt is receiving IV fluids (D5 and sodium chloride) for hydration/electrolyte repletion. Dietitian conducted NFPE: during NFPE, pt exhibited blank to blank region. Pt's daughter amenable to education; RD provided education in regards to the importance of adequate macro and micronutrients, as well as hydration to support ADLs, maintain energy levels and overall functional/nutritional status. Pt was receptive and verbalized understanding. No additional nutrition-related concerns. RD will remain available. Additional nutrition recommendations listed below to follow.  86 y/o female PMH HLD, GERD, Alzheimer's and possible frontotemporal dementia, sciatica sent from assisted living for agitation and violent behavior likely due to urinary retention, medication changes, and possibly sciatica    Pt seen on 4UR at bedside; pt was asleep, in and out of sleep per aid, RD obtained majority of information from daughter. Pt stated UBW ~ pounds, which is/is not consistent with wt upon admission (~ pounds). Appetite currently poor; PTA, appetite was poor per daughter. As per diet/24h recall, PTA pt consumed meals per day, which consisted of coffee and Faroese, cheeseburgers, salmon, grilled chicken, potatoes; pt is picky per daughter; used to eat 3 meals/day, currently eats ~2 small meals/day. During current admission, consumption of ~0-50% meals on average r/t past failed dysphagia screen and recent diet order initiation. GI: s/s blank noted. Fredi: 16. Skin integrity: abrasions to R knee, R elbow, R thigh. No edema noted. I/O: 0/901 (-901). Denies pain/discomfort. NKFA. Pt's daughter endorsed concerns/issues chewing/swallowing; full dysphagia screening could not be completed per SLP eval/documentation. Pertinent lab values: elevated H&H, AST, POCT BG; will monitor. Pt is receiving IV fluids (D5 and sodium chloride) for hydration/electrolyte repletion. Dietitian conducted NFPE: during NFPE, pt exhibited blank to blank region. Pt's daughter amenable to education; RD provided education in regards to the importance of adequate macro and micronutrients, as well as hydration to support ADLs, maintain energy levels and overall functional/nutritional status. Pt was receptive and verbalized understanding. No additional nutrition-related concerns. RD will remain available. Additional nutrition recommendations listed below to follow.  88 y/o female PMH HLD, GERD, Alzheimer's and possible frontotemporal dementia, sciatica sent from assisted living for agitation and violent behavior likely due to urinary retention, medication changes, and possibly sciatica    Pt seen on 4UR at bedside; pt was asleep, in and out of sleep per aid, RD obtained majority of information from daughter. Pt stated UBW ~ pounds, which is/is not consistent with wt upon admission (~ pounds). Appetite currently poor; PTA, appetite was poor per daughter. As per diet/24h recall, PTA pt consumed meals per day, which consisted of coffee and Hungarian, cheeseburgers, salmon, grilled chicken, potatoes; pt is picky per daughter; used to eat 3 meals/day, currently eats ~2 small meals/day. During current admission, consumption of ~0-50% meals on average r/t past failed dysphagia screen and recent diet order initiation. GI: s/s blank noted. Fredi: 16. Skin integrity: abrasions to R knee, R elbow, R thigh. No edema noted. I/O: 0/901 (-901). Denies pain/discomfort. NKFA. Pt's daughter endorsed concerns/issues chewing/swallowing; full dysphagia screening could not be completed per SLP eval/documentation. Pertinent lab values: elevated H&H, AST, POCT BG; will monitor. Pt is receiving IV fluids (D5 and sodium chloride) for hydration/electrolyte repletion. Dietitian conducted NFPE: during NFPE, pt exhibited blank to blank region. Pt's daughter amenable to education; RD provided education in regards to the importance of adequate macro and micronutrients, as well as hydration to support ADLs, maintain energy levels and overall functional/nutritional status. Pt was receptive and verbalized understanding. No additional nutrition-related concerns. RD will remain available. Additional nutrition recommendations listed below to follow.  86 y/o female PMH HLD, GERD, Alzheimer's and possible frontotemporal dementia, sciatica sent from assisted living for agitation and violent behavior likely due to urinary retention, medication changes, and possibly sciatica    Pt seen on 4UR at bedside; pt was asleep, in and out of sleep per aid, RD obtained majority of information from daughter (Sridevi). Pt stated UBW ~120 pounds, which is not consistent with wt upon admission (~130 pounds). Appetite currently fair; PTA, appetite was poor per daughter. As per diet/24h recall, PTA pt consumed ~2 meals per day, which consisted of coffee and Estonian, cheeseburgers, salmon, grilled chicken, potatoes; pt is picky per daughter; used to eat 3 meals/day, recently has been eating ~2 small meals/day. During current admission, consumption of ~0-<50% meals on average since admission r/t recent diet order initiation (diet initiated on 08/18/22). GI: s/s within normal limits at this time per flowsheets data; RN and pt's daughter endorsed no s/s N/V/D/C. Fredi: 16. Skin integrity: abrasions to R knee, R elbow, R thigh. No edema noted. I/O: 0/901 (-901). Denies pain/discomfort. NKFA. Pt's daughter endorsed concerns/issues chewing/swallowing, stated pt is not currently wearing her dentures; full dysphagia screening could not be completed per SLP eval/documentation on 08/18/22. Pertinent lab values: elevated H&H, AST, POCT BG; will monitor. Pt is receiving IV fluids (D5 and sodium chloride) for hydration/electrolyte repletion. Dietitian unable to conduct full nutrition focused physical exam at this time; recommend to conduct this upon f/u. Pt's daughter amenable to education; RD provided education in regards to the importance of adequate macro and micronutrients, as well as hydration to support ADLs, maintain energy levels and overall functional/nutritional status. Pt's daughter was receptive and verbalized understanding. No additional nutrition-related concerns. RD will remain available. Additional nutrition recommendations listed below to follow.  88 y/o female PMH HLD, GERD, Alzheimer's and possible frontotemporal dementia, sciatica sent from assisted living for agitation and violent behavior likely due to urinary retention, medication changes, and possibly sciatica    Pt seen on 4UR at bedside; pt was asleep, in and out of sleep per aid, RD obtained majority of information from daughter (Sridevi). Pt stated UBW ~120 pounds, which is not consistent with wt upon admission (~130 pounds). Appetite currently fair; PTA, appetite was poor per daughter. As per diet/24h recall, PTA pt consumed ~2 meals per day, which consisted of coffee and Greenlandic, cheeseburgers, salmon, grilled chicken, potatoes; pt is picky per daughter; used to eat 3 meals/day, recently has been eating ~2 small meals/day. During current admission, consumption of ~0-<50% meals on average since admission r/t recent diet order initiation (diet initiated on 08/18/22). GI: s/s within normal limits at this time per flowsheets data; RN and pt's daughter endorsed no s/s N/V/D/C. Fredi: 16. Skin integrity: abrasions to R knee, R elbow, R thigh. No edema noted. I/O: 0/901 (-901). Denies pain/discomfort. NKFA. Pt's daughter endorsed concerns/issues chewing/swallowing, stated pt is not currently wearing her dentures; full dysphagia screening could not be completed per SLP eval/documentation on 08/18/22. Pertinent lab values: elevated H&H, AST, POCT BG; will monitor. Pt is receiving IV fluids (D5 and sodium chloride) for hydration/electrolyte repletion. Dietitian unable to conduct full nutrition focused physical exam at this time; recommend to conduct this upon f/u. Pt's daughter amenable to education; RD provided education in regards to the importance of adequate macro and micronutrients, as well as hydration to support ADLs, maintain energy levels and overall functional/nutritional status. Pt's daughter was receptive and verbalized understanding. No additional nutrition-related concerns. RD will remain available. Additional nutrition recommendations listed below to follow.  86 y/o female PMH HLD, GERD, Alzheimer's and possible frontotemporal dementia, sciatica sent from assisted living for agitation and violent behavior likely due to urinary retention, medication changes, and possibly sciatica    Pt seen on 4UR at bedside; pt was asleep, in and out of sleep per aid, RD obtained majority of information from daughter (Sridevi). Pt stated UBW ~120 pounds, which is not consistent with wt upon admission (~130 pounds). Appetite currently fair; PTA, appetite was poor per daughter. As per diet/24h recall, PTA pt consumed ~2 meals per day, which consisted of coffee and Sinhala, cheeseburgers, salmon, grilled chicken, potatoes; pt is picky per daughter; used to eat 3 meals/day, recently has been eating ~2 small meals/day. During current admission, consumption of ~0-<50% meals on average since admission r/t recent diet order initiation (diet initiated on 08/18/22). GI: s/s within normal limits at this time per flowsheets data; RN and pt's daughter endorsed no s/s N/V/D/C. Fredi: 16. Skin integrity: abrasions to R knee, R elbow, R thigh. No edema noted. I/O: 0/901 (-901). Denies pain/discomfort. NKFA. Pt's daughter endorsed concerns/issues chewing/swallowing, stated pt is not currently wearing her dentures; full dysphagia screening could not be completed per SLP eval/documentation on 08/18/22. Pertinent lab values: elevated H&H, AST, POCT BG; will monitor. Pt is receiving IV fluids (D5 and sodium chloride) for hydration/electrolyte repletion. Dietitian unable to conduct full nutrition focused physical exam at this time; recommend to conduct this upon f/u. Pt's daughter amenable to education; RD provided education in regards to the importance of adequate macro and micronutrients, as well as hydration to support ADLs, maintain energy levels and overall functional/nutritional status. Pt's daughter was receptive and verbalized understanding. No additional nutrition-related concerns. RD will remain available. Additional nutrition recommendations listed below to follow.

## 2022-08-19 NOTE — PROGRESS NOTE ADULT - PROBLEM SELECTOR PLAN 4
Following for GOC and support.    Lauren Rondon MD  Palliative Fellow, PGY5  Geriatrics and Palliative Consult Service. Dementia with worsening agitation and aggressive episodes at her assisted living facility.  - Will need placement to another facility on discharge as current assisted living facility cannot manage her symptoms.  - Not hospice eligible as patient is still ambulatory

## 2022-08-19 NOTE — PROGRESS NOTE ADULT - ATTENDING COMMENTS
Complex medical decision making / symptom management in the setting of dementia with severe behavioral disturbances.    88yo F with dementia and severe agitation presenting from assisted. Behavioral disturbances have been difficult to manage as outpatient. Patient does not meet hospice criteria despite severe cognitive decline as she is still ambulatory, no pressure wounds, normal albumin, and no recurrent infections. Further GOC discussion as noted above resulted in MOLST completion with DNR/DNI.    Will continue to follow for ongoing GOC discussion / titration of palliative regimen. Emotional support provided, questions answered.  Active Psychosocial Referrals:  [x]Social Work/Case management []PT/OT []Chaplaincy []Hospice  []Patient/Family Support []Holistic RN []Massage Therapy []Ethics  Coping: [] well [] with difficulty [] poor coping [x] unable to assess  Support system: [] strong [x] adequate [] inadequate    For new or uncontrolled symptoms, please call Palliative Care at [phone]. The service is available 24/7 (including nights & weekends) to provide symptom management recommendations over the phone as appropriate Complex medical decision making / symptom management in the setting of dementia with severe behavioral disturbances.    86yo F with dementia and severe agitation presenting from half-way. Behavioral disturbances have been difficult to manage as outpatient. Patient does not meet hospice criteria despite severe cognitive decline as she is still ambulatory, no pressure wounds, normal albumin, and no recurrent infections. Further GOC discussion as noted above resulted in MOLST completion with DNR/DNI.    Will continue to follow for ongoing GOC discussion / titration of palliative regimen. Emotional support provided, questions answered.  Active Psychosocial Referrals:  [x]Social Work/Case management []PT/OT []Chaplaincy []Hospice  []Patient/Family Support []Holistic RN []Massage Therapy []Ethics  Coping: [] well [] with difficulty [] poor coping [x] unable to assess  Support system: [] strong [x] adequate [] inadequate    For new or uncontrolled symptoms, please call Palliative Care at [phone]. The service is available 24/7 (including nights & weekends) to provide symptom management recommendations over the phone as appropriate Complex medical decision making / symptom management in the setting of dementia with severe behavioral disturbances.    86yo F with dementia and severe agitation presenting from group home. Behavioral disturbances have been difficult to manage as outpatient. Patient does not meet hospice criteria despite severe cognitive decline as she is still ambulatory, no pressure wounds, normal albumin, and no recurrent infections. Further GOC discussion as noted above resulted in MOLST completion with DNR/DNI.    Will continue to follow for ongoing GOC discussion / titration of palliative regimen. Emotional support provided, questions answered.  Active Psychosocial Referrals:  [x]Social Work/Case management []PT/OT []Chaplaincy []Hospice  []Patient/Family Support []Holistic RN []Massage Therapy []Ethics  Coping: [] well [] with difficulty [] poor coping [x] unable to assess  Support system: [] strong [x] adequate [] inadequate    For new or uncontrolled symptoms, please call Palliative Care at [phone]. The service is available 24/7 (including nights & weekends) to provide symptom management recommendations over the phone as appropriate Complex medical decision making / symptom management in the setting of dementia with severe behavioral disturbances.    86yo F with dementia and severe agitation presenting from group home. Behavioral disturbances have been difficult to manage as outpatient. Patient does not meet hospice criteria despite severe cognitive decline as she is still ambulatory, no pressure wounds, normal albumin, and no recurrent infections. Further GOC discussion as noted above resulted in MOLST completion with DNR/DNI.    In addition to the E/M visit, an advance care planning meeting was performed. Start time: 5:00PM; End time: 5:16PM; Total time: 16min. A face to face meeting to discuss advance care planning was held today regarding: JORGE BARNARD. Primary decision maker: Patient is unable to participate in decision making; Alternate/surrogate: Sridevi Barnard. Discussed advance directives including, but not limited to, healthcare proxy and code status. Decision regarding code status: DNR/DNI; Documentation completed today: MOLST GOC note    Will continue to follow for ongoing GOC discussion / titration of palliative regimen. Emotional support provided, questions answered.  Active Psychosocial Referrals:  [x]Social Work/Case management []PT/OT []Chaplaincy []Hospice  []Patient/Family Support []Holistic RN []Massage Therapy []Ethics  Coping: [] well [] with difficulty [] poor coping [x] unable to assess  Support system: [] strong [x] adequate [] inadequate    For new or uncontrolled symptoms, please call Palliative Care at [phone]. The service is available 24/7 (including nights & weekends) to provide symptom management recommendations over the phone as appropriate Complex medical decision making / symptom management in the setting of dementia with severe behavioral disturbances.    86yo F with dementia and severe agitation presenting from intermediate. Behavioral disturbances have been difficult to manage as outpatient. Patient does not meet hospice criteria despite severe cognitive decline as she is still ambulatory, no pressure wounds, normal albumin, and no recurrent infections. Further GOC discussion as noted above resulted in MOLST completion with DNR/DNI.    In addition to the E/M visit, an advance care planning meeting was performed. Start time: 5:00PM; End time: 5:16PM; Total time: 16min. A face to face meeting to discuss advance care planning was held today regarding: JORGE BARNARD. Primary decision maker: Patient is unable to participate in decision making; Alternate/surrogate: Sridevi Barnard. Discussed advance directives including, but not limited to, healthcare proxy and code status. Decision regarding code status: DNR/DNI; Documentation completed today: MOLST GOC note    Will continue to follow for ongoing GOC discussion / titration of palliative regimen. Emotional support provided, questions answered.  Active Psychosocial Referrals:  [x]Social Work/Case management []PT/OT []Chaplaincy []Hospice  []Patient/Family Support []Holistic RN []Massage Therapy []Ethics  Coping: [] well [] with difficulty [] poor coping [x] unable to assess  Support system: [] strong [x] adequate [] inadequate    For new or uncontrolled symptoms, please call Palliative Care at [phone]. The service is available 24/7 (including nights & weekends) to provide symptom management recommendations over the phone as appropriate Complex medical decision making / symptom management in the setting of dementia with severe behavioral disturbances.    88yo F with dementia and severe agitation presenting from detention. Behavioral disturbances have been difficult to manage as outpatient. Patient does not meet hospice criteria despite severe cognitive decline as she is still ambulatory, no pressure wounds, normal albumin, and no recurrent infections. Further GOC discussion as noted above resulted in MOLST completion with DNR/DNI.    In addition to the E/M visit, an advance care planning meeting was performed. Start time: 5:00PM; End time: 5:16PM; Total time: 16min. A face to face meeting to discuss advance care planning was held today regarding: JORGE BARNARD. Primary decision maker: Patient is unable to participate in decision making; Alternate/surrogate: Sridevi Barnard. Discussed advance directives including, but not limited to, healthcare proxy and code status. Decision regarding code status: DNR/DNI; Documentation completed today: MOLST GOC note    Will continue to follow for ongoing GOC discussion / titration of palliative regimen. Emotional support provided, questions answered.  Active Psychosocial Referrals:  [x]Social Work/Case management []PT/OT []Chaplaincy []Hospice  []Patient/Family Support []Holistic RN []Massage Therapy []Ethics  Coping: [] well [] with difficulty [] poor coping [x] unable to assess  Support system: [] strong [x] adequate [] inadequate    For new or uncontrolled symptoms, please call Palliative Care at [phone]. The service is available 24/7 (including nights & weekends) to provide symptom management recommendations over the phone as appropriate

## 2022-08-19 NOTE — PROGRESS NOTE ADULT - PROBLEM SELECTOR PLAN 1
Worsening agitation with aggressive episodes over past few months requiring hospitalization multiple times. Patient was aggressive to staff at current assisted living facility and is no longer able to return there on discharge. Aggression better controlled since admission and patient transitioning to oral meds for her behavioral disturbances.  - Psych following, recs appreciated.

## 2022-08-19 NOTE — PROGRESS NOTE ADULT - PROBLEM SELECTOR PLAN 5
BP has been wnl. Holding home amlodipine 5    - c/w amlodipine 5 BP has been wnl    - c/w amlodipine 5 qd

## 2022-08-19 NOTE — PROGRESS NOTE ADULT - CONVERSATION DETAILS
Had a discussion with patient's daughter and HCP Sridevi Akil regarding her mother's advanced directives. We discussed DNR and DNI and the risks and benefits of both CPR and mechanical ventilation. Sridevi expressed understanding that if her mother's heart were to stop and we attempted resuscitation, she would not return to her current baseline functional level because of her age and underlying neurodegenerative disease. In addition, given her age and comorbidities, she would also have difficulty coming off a ventilator if she were to require intubation. Sridevi explained that she would not want her mother to be intubated or resuscitated but that she did not feel comfortable putting a DNR and DNI in place until she told her sisters but that she was planning on letting them know this afternoon. She explained that once she told them her decision, she would reach out to the Palliative Care team or the primary team to put a DNR and DNI in place. Had a discussion with patient's daughter and HCP Sridevi Akil regarding her mother's advanced directives. We discussed DNR and DNI and the risks and benefits of both CPR and mechanical ventilation. Sridevi expressed understanding that if her mother's heart were to stop and we attempted resuscitation, she would not return to her current baseline functional level because of her age and underlying neurodegenerative disease. In addition, given her age and comorbidities, she would also have difficulty coming off a ventilator if she were to require intubation. Sridevi explained that she would not want her mother to be intubated or resuscitated but that she did not feel comfortable putting a DNR and DNI in place until she told her sisters but that she was planning on letting them know this afternoon. She explained that once she told them her decision, she would reach out to the Palliative Care team or the primary team to put a DNR and DNI in place.    -----------------------  Addendum:  Spoke to patient's daughter Sridevi again and she confirmed that she and her sisters are in agreement to make their mother DNR and DNI. I confirmed that I would document their wishes and put the order in place.

## 2022-08-19 NOTE — PROGRESS NOTE ADULT - ATTENDING COMMENTS
86 y/o female PMH HLD, GERD, Alzheimer's and possible frontotemporal dementia, sciatica sent from assisted living for agitation    Patient calm, resting comfortably upon assessment, ambulating in [place] with PCAs    #Alzheimer's with agitation  Psychiatry following, appreciate recommendations for agitation in the setting of alzheimers  - now tolerating PO, started seroquel 50mg PO BID, decrease haldol to PRN.  Patient calm today  - cleared for minced and moist diet  - stop IVF, urinating on her own  - depakote converted to PO  - PT eval    #Severe protein calorie malnutrition  - dietician eval    Remainder of plan as above    Dispo: [facility] 88 y/o female PMH HLD, GERD, Alzheimer's and possible frontotemporal dementia, sciatica sent from assisted living for agitation    Patient calm, resting comfortably upon assessment, ambulating in [place] with PCAs    #Alzheimer's with agitation  Psychiatry following, appreciate recommendations for agitation in the setting of alzheimers  - now tolerating PO, started seroquel 50mg PO BID, decrease haldol to PRN.  Patient calm today  - cleared for minced and moist diet  - stop IVF, urinating on her own  - depakote converted to PO  - PT eval    #Severe protein calorie malnutrition  - dietician eval    Remainder of plan as above    Dispo: [facility]

## 2022-08-19 NOTE — PROGRESS NOTE ADULT - PROBLEM SELECTOR PLAN 6
Following for GOC and support.    Lauren Rondon MD  Palliative Fellow, PGY5  Geriatrics and Palliative Consult Service.

## 2022-08-19 NOTE — PROGRESS NOTE ADULT - PROBLEM SELECTOR PLAN 3
Spoke with daughter Sridevi Barnard (also HCP). She endorses that she wants to make her mother DNR/DNI but is unwilling to do so until she informs her sisters.  - HCP: Sridevi Barnard, [phone] [facility]  - HCP to let primary team know when she wants patient to be made DNR Spoke with daughter Sridevi Barnard (also HCP). She endorses that she wants to make her mother DNR/DNI but is unwilling to do so until she informs her sisters.  - HCP: Sridevi Barnard, [phone] [facility]  - HCP to let primary team know when she wants patient to be made DNR Spoke with daughter Sridevi Barnard (also HCP). She endorses that she wants to make her mother DNR/DNI but is unwilling to do so until she informs her sisters.  - HCP: Sridevi Barnard, [phone] ([facility]  - HCP to let primary team know when she wants patient to be made DNR Spoke with daughter Sridevi Barnard (also HCP). GOC as above.  - HCP: Sridevi [place], [phone] (HCP form in [employer]).  - DNR/DNI Spoke with daughter Sridevi Barnard (also HCP). GOC as above.  - HCP: Sridevi [place], [phone] (HCP form in [employer]).  - DNR/DNI Spoke with daughter Sridevi Barnard (also HCP). GOC as above.  - HCP: Sridevi [place], [phone] (HCP form in [employer]).  - DNR/DNI Tolerating a diet as outpatient  - restarted on a diet  - family would likely not want a long-term feeding tube

## 2022-08-19 NOTE — DIETITIAN INITIAL EVALUATION ADULT - PERTINENT MEDS FT
MEDICATIONS  (STANDING):  amLODIPine   Tablet 5 milliGRAM(s) Oral every 24 hours  diVALproex Sprinkle 250 milliGRAM(s) Oral every 12 hours  enoxaparin Injectable 40 milliGRAM(s) SubCutaneous every 24 hours  escitalopram 5 milliGRAM(s) Oral every 24 hours  QUEtiapine 50 milliGRAM(s) Oral two times a day    MEDICATIONS  (PRN):  aluminum hydroxide/magnesium hydroxide/simethicone Suspension 30 milliLiter(s) Oral every 4 hours PRN Dyspepsia  haloperidol    Injectable 0.5 milliGRAM(s) IntraMuscular every 8 hours PRN for agitation  melatonin 3 milliGRAM(s) Oral at bedtime PRN Insomnia

## 2022-08-19 NOTE — PROGRESS NOTE ADULT - PROBLEM SELECTOR PLAN 2
Dementia with worsening agitation and aggressive episodes at her assisted living facility.  - Will need placement to another facility on discharge as current assisted living facility cannot manage her symptoms.  - Not hospice eligible as patient is still ambulatory PPSV = 30%, requires assistance for most ADLs  -patient would not participate/benefit with restorative rehab services  -c/w supportive care, OOB, encourage movement

## 2022-08-19 NOTE — PROGRESS NOTE ADULT - PROBLEM SELECTOR PLAN 4
Patient with urinary retention no admission which likely contributed to symptoms. Having PVR <200 thus far     - cw bladder scan q6, straight cath if PVR>300 Patient with urinary retention no admission which likely contributed to symptoms.   Pt able to void on her own, PVR <200 thus far     - cw bladder scan q6, straight cath if PVR>300

## 2022-08-19 NOTE — PROGRESS NOTE ADULT - ASSESSMENT
86 yo F with HLD, GERD, dementia (with behavioral disturbances), sciatica sent from assisted living for agitation and violent behavior. Follows with outpatient [facility] Ger[facility] at assisted living facility. Geriatrics and Palliative Care team consulted for GOC. 86 yo F with HLD, GERD, dementia (with behavioral disturbances), sciatica sent from assisted living for agitation and violent behavior. Follows with outpatient [facility] Geriatrics at assisted living facility. Geriatrics and Palliative Care team consulted for GOC. 86 yo F with HLD, GERD, dementia (with behavioral disturbances), sciatica sent from assisted living for agitation and violent behavior. Follows with outpatient [facility] Geriatrics at assisted living facility. Geriatrics and Palliative Care team consulted for GOC.

## 2022-08-19 NOTE — PROGRESS NOTE ADULT - ASSESSMENT
86 y/o female PMH HLD, GERD, Alzheimer's and possible frontotemporal dementia, sciatica sent from assisted living for agitation.   88 y/o female PMH HLD, GERD, Alzheimer's and possible frontotemporal dementia, sciatica sent from assisted living for agitation.

## 2022-08-19 NOTE — PROGRESS NOTE ADULT - PROBLEM SELECTOR PLAN 9
F: none  E: replete PRN  N: minced and moist  GI: PO protonix 40  DVT: Lovenox 40  Code: Full  Dispo: [facility]

## 2022-08-19 NOTE — DIETITIAN INITIAL EVALUATION ADULT - OTHER CALCULATIONS
Based on Standards of Care pt within % IBW (IBW is 135#), thus actual body weight used for all calculations. Needs adjusted for advanced age. Fluid recs per team.

## 2022-08-19 NOTE — DIETITIAN INITIAL EVALUATION ADULT - PERSON TAUGHT/METHOD
Pt's daughter amenable to education; RD provided education in regards to the importance of adequate macro and micronutrients, as well as hydration to support ADLs, maintain energy levels and overall functional/nutritional status. Pt's daughter was receptive and verbalized understanding./verbal instruction/computer/Internet/daughter instructed

## 2022-08-19 NOTE — DIETITIAN INITIAL EVALUATION ADULT - ADD RECOMMEND
1. Continue with current diet order  >>Ensure High Protein Plus BID (350kcal, 20gPRO per serving)   2. Encourage pt to meet nutritional needs as able   3. Monitor PO intakes, trend weights (weekly), monitor skin integrity, monitor labs (electrolytes, CMP), monitor GI fxn   4. Encourage adherence to diet education (reinforce as able)   5. Pain and bowel regimen per team   6. Will continue to assess/honor preferences as able   7. Align nutrition interventions with GOC at all times

## 2022-08-19 NOTE — PROGRESS NOTE ADULT - PROBLEM SELECTOR PLAN 1
Pt came in for acute agitation at [facility].     Plan  - IV valproate 250 BID transitioned to depakote sprinkles 250 BID   - c/w seroquel 50 BID  - IM haldol 0.5 TID changed to PRN   - holding home Lexapro 5mg qd, olanzapine 5mg qd  - Monitor for any adverse response to antipsychotic given question of dystonic reaction with use of multiple doses of PRN olanzapine in ED.  - Obtain EKG to monitor QTc  - c/w melatonin 3mg Pt came in for acute agitation at [facility].     Plan  - IV valproate 250 BID transitioned to depakote sprinkles 250 BID   - c/w seroquel 50 BID  - IM haldol 0.5 TID changed to PRN   - holding home Lexapro 5mg qd, olanzapine 5mg qd  - Monitor for any adverse response to antipsychotic given question of dystonic reaction with use of multiple doses of PRN olanzapine in ED.  - Obtain EKG to monitor QTc  - c/w melatonin 3mg Pt came in for acute agitation at [facility].     Plan  - IV valproate 250 BID transitioned to depakote sprinkles 250 BID   - c/w seroquel 50 BID  - IM haldol 0.5 TID changed to PRN   - holding home Lexapro 5mg qd, olanzapine 5mg qd  - Monitor for any adverse response to antipsychotic given question of dystonic reaction with use of multiple doses of PRN olanzapine in ED.  - Obtain EKG to monitor QTc  - c/w melatonin 3mg Pt came in for acute agitation from [facility].  Resting comfortably, now tolerating PO medications with ice cream    Plan  - IV valproate 250 BID transitioned to depakote sprinkles 250 BID   - c/w seroquel 50 BID  - c/w melatonin 3mg  - IM haldol 0.5 TID standing changed to PRN   - holding home Lexapro 5mg qd, olanzapine 5mg qd  - PRN agitation: haldol 0.5 TID, quetiapine 12.5mg po Q6H  - Obtain EKG to monitor QTc Pt came in for acute agitation from [facility].  Resting comfortably, now tolerating PO medications with ice cream    Plan  - IV valproate 250 BID transitioned to depakote sprinkles 250 BID   - c/w seroquel 50 BID  - c/w melatonin 3mg  - IM haldol 0.5 TID standing changed to PRN   - holding home Lexapro 5mg qd, olanzapine 5mg qd  - PRN agitation: haldol 0.5 TID, quetiapine 12.5mg po Q6H  - Obtain EKG to monitor QTc Pt came in for acute agitation from [facility].  Resting comfortably, now tolerating PO medications with ice cream    Plan  - IV valproate 250 BID transitioned to depakote sprinkles 250 BID   - c/w seroquel 50 BID  - c/w melatonin 3mg  - IM haldol 0.5 TID standing changed to PRN   - holding home Lexapro 5mg qd, olanzapine 5mg qd  - PRN agitation: haldol 0.5 TID, quetiapine 12.5mg po Q6H  - Obtain EKG to monitor QTc

## 2022-08-19 NOTE — BH CONSULTATION LIAISON PROGRESS NOTE - NSBHATTESTBILLINGAW_PSY_A_CORE
[id number] Inpatient care - low complexity - 15 minutes [id number] Inpatient care - low complexity - 15 minutes [id number] Inpatient care - low complexity - 15 minutes

## 2022-08-19 NOTE — PROGRESS NOTE ADULT - PROBLEM SELECTOR PLAN 3
Pt started on minced and moist diet, tolerating well. s/p D5, 1/2 NS standing when NPO    Plan  - continue to monitor

## 2022-08-19 NOTE — BH CONSULTATION LIAISON PROGRESS NOTE - NSBHFUPINTERVALHXFT_PSY_A_CORE
Pt maintained on 1:1 observation for agitation. Received one PRN dose of 0.5mg haloperidol at 1218pm yesterday per MAR. Started back on oral quetiapine and VPA yesterday PM. Per sitter this morning, pt was intermittently agitated overnight, kicking and biting at staff, calm since her shift began at 7am.    On interview this morning, pt asleep and did not awaken to voice or light tactile stimuli and additional interview deferred.

## 2022-08-19 NOTE — PROGRESS NOTE ADULT - SUBJECTIVE AND OBJECTIVE BOX
SUBJECTIVE AND OBJECTIVE:  Indication for Geriatrics and Palliative Care Services/INTERVAL HPI: GOC    OVERNIGHT EVENTS: Pt passed dysphagia screen and started on PO diet.    Allergies    No Known Allergies    Intolerances    MEDICATIONS  (STANDING):  amLODIPine   Tablet 5 milliGRAM(s) Oral every 24 hours  diVALproex Sprinkle 250 milliGRAM(s) Oral every 12 hours  enoxaparin Injectable 40 milliGRAM(s) SubCutaneous every 24 hours  escitalopram 5 milliGRAM(s) Oral every 24 hours  pantoprazole    Tablet 40 milliGRAM(s) Oral before breakfast  QUEtiapine 50 milliGRAM(s) Oral two times a day    MEDICATIONS  (PRN):  aluminum hydroxide/magnesium hydroxide/simethicone Suspension 30 milliLiter(s) Oral every 4 hours PRN Dyspepsia  haloperidol    Injectable 0.5 milliGRAM(s) IntraMuscular every 8 hours PRN for agitation  melatonin 3 milliGRAM(s) Oral at bedtime PRN Insomnia      ITEMS UNCHECKED ARE NOT PRESENT    PRESENT SYMPTOMS: [X]Unable to self-report due to lethargy  Source if other than patient:  [ ]Family   [ ]Team     Pain:  [ ]yes [ ]no  QOL impact -   Location -                    Aggravating factors -  Quality -  Radiation -  Timing-  Severity (0-10 scale):  Minimal acceptable level (0-10 scale):     Dyspnea:                           [ ]Mild [ ]Moderate [ ]Severe  Anxiety:                             [ ]Mild [ ]Moderate [ ]Severe  Fatigue:                             [ ]Mild [ ]Moderate [ ]Severe  Nausea:                             [ ]Mild [ ]Moderate [ ]Severe  Loss of appetite:              [ ]Mild [ ]Moderate [ ]Severe  Constipation:                    [ ]Mild [ ]Moderate [ ]Severe    Other Symptoms:  [ ]All other review of systems negative     Palliative Performance Status Version 2:        60 %      http://npcrc.org/files/news/palliative_performance_scale_ppsv2.pdf    PHYSICAL EXAM:  Vital Signs Last 24 Hrs  T(C): 36.7 (19 Aug 2022 06:08), Max: 36.7 (19 Aug 2022 06:08)  T(F): 98.1 (19 Aug 2022 06:08), Max: 98.1 (19 Aug 2022 06:08)  HR: 89 (19 Aug 2022 06:08) (89 - 114)  BP: 131/64 (19 Aug 2022 06:08) (105/61 - 131/64)  BP(mean): --  RR: 17 (19 Aug 2022 06:08) (17 - 17)  SpO2: 98% (19 Aug 2022 06:08) (98% - 98%)    Parameters below as of 19 Aug 2022 06:08  Patient On (Oxygen Delivery Method): room air     I&O's Summary    18 Aug 2022 07:01  -  19 Aug 2022 07:00  --------------------------------------------------------  IN: 0 mL / OUT: 1 mL / NET: -1 mL       GENERAL: [ ]Cachexia    [ ]Alert  [ ]Oriented x   [X]Lethargic  [ ]Unarousable  [X]Verbal  [ ]Non-Verbal  Behavioral:  [ ]Anxiety  [X]Delirium [X]Agitation [ ]Other  HEENT:  [X]Normal   [ ]Dry mouth   [ ]ET Tube/Trach  [ ]Oral lesions  PULMONARY:   [X]Clear [ ]Tachypnea  [ ]Audible excessive secretions   [ ]Rhonchi        [ ]Right [ ]Left [ ]Bilateral  [ ]Crackles        [ ]Right [ ]Left [ ]Bilateral  [ ]Wheezing     [ ]Right [ ]Left [ ]Bilateral  [ ]Diminished BS [ ] Right [ ]Left [ ]Bilateral  CARDIOVASCULAR:    [X]Regular [ ]Irregular [ ]Tachy  [ ]Juan Pablo [ ]Murmur [ ]Other  GASTROINTESTINAL:  [X]Soft  [ ]Distended   [ ]+BS  [X]Non tender [ ]Tender  [ ]Other [ ]PEG [ ]OGT/ NGT   Last BM:   GENITOURINARY:  [X]Normal [ ]Incontinent   [ ]Oliguria/Anuria   [ ]Daley  MUSCULOSKELETAL:   [X]Normal   [ ]Weakness  [ ]Bed/Wheelchair bound [ ]Edema  NEUROLOGIC:   [ ]No focal deficits  [X] Cognitive impairment  [ ] Dysphagia [ ]Dysarthria [ ] Paresis [ ]Other   SKIN:   [X]Normal  [ ]Rash  [ ]Other  [ ]Pressure ulcer(s) [ ]y [ ]n present on admission    CRITICAL CARE:  [ ]Shock Present  [ ]Septic [ ]Cardiogenic [ ]Neurologic [ ]Hypovolemic  [ ]Vasopressors [ ]Inotropes  [ ]Respiratory failure present [ ]Mechanical Ventilation [ ]Non-invasive ventilatory support [ ]High-Flow   [ ]Acute  [ ]Chronic [ ]Hypoxic  [ ]Hypercarbic [ ]Other  [ ]Other organ failure     LABS:      RADIOLOGY & ADDITIONAL STUDIES:    Protein Calorie Malnutrition Present: [ ]mild [ ]moderate [ ]severe [ ]underweight [ ]morbid obesity  https://www.andeal.org/vault/2440/web/files/ONC/Table_Clinical%20Characteristics%20to%20Document%20Malnutrition-White%20JV%20et%20al%794853.pdf    Height (cm): 170.2 (08-15-22 @ 13:11)  Weight (kg): 59 (08-15-22 @ 13:11)  BMI (kg/m2): 20.4 (08-15-22 @ 13:11)    [ ]PPSV2 < or = 30%  [ ]significant weight loss [ ]poor nutritional intake [ ]anasarca[ ]Artificial Nutrition    REFERRALS:   [ ]Chaplaincy  [ ]Hospice  [ ]Child Life  [ ]Social Work  [ ]Case management [ ]Holistic Therapy  SUBJECTIVE AND OBJECTIVE:  Indication for Geriatrics and Palliative Care Services/INTERVAL HPI: GOC    OVERNIGHT EVENTS: Pt passed dysphagia screen and started on PO diet.    Allergies    No Known Allergies    Intolerances    MEDICATIONS  (STANDING):  amLODIPine   Tablet 5 milliGRAM(s) Oral every 24 hours  diVALproex Sprinkle 250 milliGRAM(s) Oral every 12 hours  enoxaparin Injectable 40 milliGRAM(s) SubCutaneous every 24 hours  escitalopram 5 milliGRAM(s) Oral every 24 hours  pantoprazole    Tablet 40 milliGRAM(s) Oral before breakfast  QUEtiapine 50 milliGRAM(s) Oral two times a day    MEDICATIONS  (PRN):  aluminum hydroxide/magnesium hydroxide/simethicone Suspension 30 milliLiter(s) Oral every 4 hours PRN Dyspepsia  haloperidol    Injectable 0.5 milliGRAM(s) IntraMuscular every 8 hours PRN for agitation  melatonin 3 milliGRAM(s) Oral at bedtime PRN Insomnia      ITEMS UNCHECKED ARE NOT PRESENT    PRESENT SYMPTOMS: [X]Unable to self-report due to lethargy  Source if other than patient:  [ ]Family   [ ]Team     Pain:  [ ]yes [ ]no  QOL impact -   Location -                    Aggravating factors -  Quality -  Radiation -  Timing-  Severity (0-10 scale):  Minimal acceptable level (0-10 scale):     Dyspnea:                           [ ]Mild [ ]Moderate [ ]Severe  Anxiety:                             [ ]Mild [ ]Moderate [ ]Severe  Fatigue:                             [ ]Mild [ ]Moderate [ ]Severe  Nausea:                             [ ]Mild [ ]Moderate [ ]Severe  Loss of appetite:              [ ]Mild [ ]Moderate [ ]Severe  Constipation:                    [ ]Mild [ ]Moderate [ ]Severe    Other Symptoms:  [ ]All other review of systems negative     Palliative Performance Status Version 2:        60 %      http://npcrc.org/files/news/palliative_performance_scale_ppsv2.pdf    PHYSICAL EXAM:  Vital Signs Last 24 Hrs  T(C): 36.7 (19 Aug 2022 06:08), Max: 36.7 (19 Aug 2022 06:08)  T(F): 98.1 (19 Aug 2022 06:08), Max: 98.1 (19 Aug 2022 06:08)  HR: 89 (19 Aug 2022 06:08) (89 - 114)  BP: 131/64 (19 Aug 2022 06:08) (105/61 - 131/64)  BP(mean): --  RR: 17 (19 Aug 2022 06:08) (17 - 17)  SpO2: 98% (19 Aug 2022 06:08) (98% - 98%)    Parameters below as of 19 Aug 2022 06:08  Patient On (Oxygen Delivery Method): room air     I&O's Summary    18 Aug 2022 07:01  -  19 Aug 2022 07:00  --------------------------------------------------------  IN: 0 mL / OUT: 1 mL / NET: -1 mL       GENERAL: [ ]Cachexia    [ ]Alert  [ ]Oriented x   [X]Lethargic  [ ]Unarousable  [X]Verbal  [ ]Non-Verbal  Behavioral:  [ ]Anxiety  [X]Delirium [X]Agitation [ ]Other  HEENT:  [X]Normal   [ ]Dry mouth   [ ]ET Tube/Trach  [ ]Oral lesions  PULMONARY:   [X]Clear [ ]Tachypnea  [ ]Audible excessive secretions   [ ]Rhonchi        [ ]Right [ ]Left [ ]Bilateral  [ ]Crackles        [ ]Right [ ]Left [ ]Bilateral  [ ]Wheezing     [ ]Right [ ]Left [ ]Bilateral  [ ]Diminished BS [ ] Right [ ]Left [ ]Bilateral  CARDIOVASCULAR:    [X]Regular [ ]Irregular [ ]Tachy  [ ]Juan Pablo [ ]Murmur [ ]Other  GASTROINTESTINAL:  [X]Soft  [ ]Distended   [ ]+BS  [X]Non tender [ ]Tender  [ ]Other [ ]PEG [ ]OGT/ NGT   Last BM:   GENITOURINARY:  [X]Normal [ ]Incontinent   [ ]Oliguria/Anuria   [ ]Daley  MUSCULOSKELETAL:   [X]Normal   [ ]Weakness  [ ]Bed/Wheelchair bound [ ]Edema  NEUROLOGIC:   [ ]No focal deficits  [X] Cognitive impairment  [ ] Dysphagia [ ]Dysarthria [ ] Paresis [ ]Other   SKIN:   [X]Normal  [ ]Rash  [ ]Other  [ ]Pressure ulcer(s) [ ]y [ ]n present on admission    CRITICAL CARE:  [ ]Shock Present  [ ]Septic [ ]Cardiogenic [ ]Neurologic [ ]Hypovolemic  [ ]Vasopressors [ ]Inotropes  [ ]Respiratory failure present [ ]Mechanical Ventilation [ ]Non-invasive ventilatory support [ ]High-Flow   [ ]Acute  [ ]Chronic [ ]Hypoxic  [ ]Hypercarbic [ ]Other  [ ]Other organ failure     LABS:      RADIOLOGY & ADDITIONAL STUDIES:    Protein Calorie Malnutrition Present: [ ]mild [ ]moderate [ ]severe [ ]underweight [ ]morbid obesity  https://www.andeal.org/vault/2440/web/files/ONC/Table_Clinical%20Characteristics%20to%20Document%20Malnutrition-White%20JV%20et%20al%910563.pdf    Height (cm): 170.2 (08-15-22 @ 13:11)  Weight (kg): 59 (08-15-22 @ 13:11)  BMI (kg/m2): 20.4 (08-15-22 @ 13:11)    [ ]PPSV2 < or = 30%  [ ]significant weight loss [ ]poor nutritional intake [ ]anasarca[ ]Artificial Nutrition    REFERRALS:   [ ]Chaplaincy  [ ]Hospice  [ ]Child Life  [ ]Social Work  [ ]Case management [ ]Holistic Therapy  SUBJECTIVE AND OBJECTIVE:  Indication for Geriatrics and Palliative Care Services/INTERVAL HPI: GOC    OVERNIGHT EVENTS: Pt passed dysphagia screen and started on PO diet.    Allergies    No Known Allergies    Intolerances    MEDICATIONS  (STANDING):  amLODIPine   Tablet 5 milliGRAM(s) Oral every 24 hours  diVALproex Sprinkle 250 milliGRAM(s) Oral every 12 hours  enoxaparin Injectable 40 milliGRAM(s) SubCutaneous every 24 hours  escitalopram 5 milliGRAM(s) Oral every 24 hours  pantoprazole    Tablet 40 milliGRAM(s) Oral before breakfast  QUEtiapine 50 milliGRAM(s) Oral two times a day    MEDICATIONS  (PRN):  aluminum hydroxide/magnesium hydroxide/simethicone Suspension 30 milliLiter(s) Oral every 4 hours PRN Dyspepsia  haloperidol    Injectable 0.5 milliGRAM(s) IntraMuscular every 8 hours PRN for agitation  melatonin 3 milliGRAM(s) Oral at bedtime PRN Insomnia      ITEMS UNCHECKED ARE NOT PRESENT    PRESENT SYMPTOMS: [X]Unable to self-report due to lethargy  Source if other than patient:  [ ]Family   [ ]Team     Pain:  [ ]yes [ ]no  QOL impact -   Location -                    Aggravating factors -  Quality -  Radiation -  Timing-  Severity (0-10 scale):  Minimal acceptable level (0-10 scale):     Dyspnea:                           [ ]Mild [ ]Moderate [ ]Severe  Anxiety:                             [ ]Mild [ ]Moderate [ ]Severe  Fatigue:                             [ ]Mild [ ]Moderate [ ]Severe  Nausea:                             [ ]Mild [ ]Moderate [ ]Severe  Loss of appetite:              [ ]Mild [ ]Moderate [ ]Severe  Constipation:                    [ ]Mild [ ]Moderate [ ]Severe    Other Symptoms:  [ ]All other review of systems negative     Palliative Performance Status Version 2:        60 %      http://npcrc.org/files/news/palliative_performance_scale_ppsv2.pdf    PHYSICAL EXAM:  Vital Signs Last 24 Hrs  T(C): 36.7 (19 Aug 2022 06:08), Max: 36.7 (19 Aug 2022 06:08)  T(F): 98.1 (19 Aug 2022 06:08), Max: 98.1 (19 Aug 2022 06:08)  HR: 89 (19 Aug 2022 06:08) (89 - 114)  BP: 131/64 (19 Aug 2022 06:08) (105/61 - 131/64)  BP(mean): --  RR: 17 (19 Aug 2022 06:08) (17 - 17)  SpO2: 98% (19 Aug 2022 06:08) (98% - 98%)    Parameters below as of 19 Aug 2022 06:08  Patient On (Oxygen Delivery Method): room air     I&O's Summary    18 Aug 2022 07:01  -  19 Aug 2022 07:00  --------------------------------------------------------  IN: 0 mL / OUT: 1 mL / NET: -1 mL       GENERAL: [ ]Cachexia    [ ]Alert  [ ]Oriented x   [X]Lethargic  [ ]Unarousable  [X]Verbal  [ ]Non-Verbal  Behavioral:  [ ]Anxiety  [X]Delirium [X]Agitation [ ]Other  HEENT:  [X]Normal   [ ]Dry mouth   [ ]ET Tube/Trach  [ ]Oral lesions  PULMONARY:   [X]Clear [ ]Tachypnea  [ ]Audible excessive secretions   [ ]Rhonchi        [ ]Right [ ]Left [ ]Bilateral  [ ]Crackles        [ ]Right [ ]Left [ ]Bilateral  [ ]Wheezing     [ ]Right [ ]Left [ ]Bilateral  [ ]Diminished BS [ ] Right [ ]Left [ ]Bilateral  CARDIOVASCULAR:    [X]Regular [ ]Irregular [ ]Tachy  [ ]Juan Pablo [ ]Murmur [ ]Other  GASTROINTESTINAL:  [X]Soft  [ ]Distended   [ ]+BS  [X]Non tender [ ]Tender  [ ]Other [ ]PEG [ ]OGT/ NGT   Last BM:   GENITOURINARY:  [X]Normal [ ]Incontinent   [ ]Oliguria/Anuria   [ ]Daley  MUSCULOSKELETAL:   [X]Normal   [ ]Weakness  [ ]Bed/Wheelchair bound [ ]Edema  NEUROLOGIC:   [ ]No focal deficits  [X] Cognitive impairment  [ ] Dysphagia [ ]Dysarthria [ ] Paresis [ ]Other   SKIN:   [X]Normal  [ ]Rash  [ ]Other  [ ]Pressure ulcer(s) [ ]y [ ]n present on admission    CRITICAL CARE:  [ ]Shock Present  [ ]Septic [ ]Cardiogenic [ ]Neurologic [ ]Hypovolemic  [ ]Vasopressors [ ]Inotropes  [ ]Respiratory failure present [ ]Mechanical Ventilation [ ]Non-invasive ventilatory support [ ]High-Flow   [ ]Acute  [ ]Chronic [ ]Hypoxic  [ ]Hypercarbic [ ]Other  [ ]Other organ failure     LABS:      RADIOLOGY & ADDITIONAL STUDIES:    Protein Calorie Malnutrition Present: [ ]mild [ ]moderate [ ]severe [ ]underweight [ ]morbid obesity  https://www.andeal.org/vault/2440/web/files/ONC/Table_Clinical%20Characteristics%20to%20Document%20Malnutrition-White%20JV%20et%20al%780651.pdf    Height (cm): 170.2 (08-15-22 @ 13:11)  Weight (kg): 59 (08-15-22 @ 13:11)  BMI (kg/m2): 20.4 (08-15-22 @ 13:11)    [ ]PPSV2 < or = 30%  [ ]significant weight loss [ ]poor nutritional intake [ ]anasarca[ ]Artificial Nutrition    REFERRALS:   [ ]Chaplaincy  [ ]Hospice  [ ]Child Life  [ ]Social Work  [ ]Case management [ ]Holistic Therapy  SUBJECTIVE AND OBJECTIVE:  Indication for Geriatrics and Palliative Care Services/INTERVAL HPI: GOC    OVERNIGHT EVENTS: Pt passed dysphagia screen and started on PO diet. Remains intermittently agitated and not willing to participate. Further discussion with family.    Allergies  No Known Allergies    MEDICATIONS  (STANDING):  amLODIPine   Tablet 5 milliGRAM(s) Oral every 24 hours  diVALproex Sprinkle 250 milliGRAM(s) Oral every 12 hours  enoxaparin Injectable 40 milliGRAM(s) SubCutaneous every 24 hours  escitalopram 5 milliGRAM(s) Oral every 24 hours  pantoprazole    Tablet 40 milliGRAM(s) Oral before breakfast  QUEtiapine 50 milliGRAM(s) Oral two times a day    MEDICATIONS  (PRN):  aluminum hydroxide/magnesium hydroxide/simethicone Suspension 30 milliLiter(s) Oral every 4 hours PRN Dyspepsia  haloperidol    Injectable 0.5 milliGRAM(s) IntraMuscular every 8 hours PRN for agitation  melatonin 3 milliGRAM(s) Oral at bedtime PRN Insomnia    ITEMS UNCHECKED ARE NOT PRESENT    PRESENT SYMPTOMS: [X]Unable to self-report due to lethargy  Source if other than patient:  [ ]Family   [ ]Team     Pain:  [ ]yes [ ]no  QOL impact -   Location -                    Aggravating factors -  Quality -  Radiation -  Timing-  Severity (0-10 scale):  Minimal acceptable level (0-10 scale):     Dyspnea:                           [ ]Mild [ ]Moderate [ ]Severe  Anxiety:                             [ ]Mild [ ]Moderate [ ]Severe  Fatigue:                             [ ]Mild [ ]Moderate [ ]Severe  Nausea:                             [ ]Mild [ ]Moderate [ ]Severe  Loss of appetite:              [ ]Mild [ ]Moderate [ ]Severe  Constipation:                    [ ]Mild [ ]Moderate [ ]Severe    Other Symptoms:  [ ]All other review of systems negative     Palliative Performance Status Version 2:        40 %      http://npcrc.org/files/news/palliative_performance_scale_ppsv2.pdf    PHYSICAL EXAM:  Vital Signs Last 24 Hrs  T(C): 36.7 (19 Aug 2022 06:08), Max: 36.7 (19 Aug 2022 06:08)  T(F): 98.1 (19 Aug 2022 06:08), Max: 98.1 (19 Aug 2022 06:08)  HR: 89 (19 Aug 2022 06:08) (89 - 114)  BP: 131/64 (19 Aug 2022 06:08) (105/61 - 131/64)  BP(mean): --  RR: 17 (19 Aug 2022 06:08) (17 - 17)  SpO2: 98% (19 Aug 2022 06:08) (98% - 98%)    Parameters below as of 19 Aug 2022 06:08  Patient On (Oxygen Delivery Method): room air     I&O's Summary    18 Aug 2022 07:01  -  19 Aug 2022 07:00  --------------------------------------------------------  IN: 0 mL / OUT: 1 mL / NET: -1 mL       GENERAL: [ ]Cachexia    [ ]Alert  [ ]Oriented x   [X]Lethargic  [ ]Unarousable  [X]Verbal  [ ]Non-Verbal  Behavioral:  [ ]Anxiety  [X]Delirium [X]Agitation [ ]Other  HEENT:  [X]Normal   [ ]Dry mouth   [ ]ET Tube/Trach  [ ]Oral lesions  PULMONARY:   [X]Clear [ ]Tachypnea  [ ]Audible excessive secretions   [ ]Rhonchi        [ ]Right [ ]Left [ ]Bilateral  [ ]Crackles        [ ]Right [ ]Left [ ]Bilateral  [ ]Wheezing     [ ]Right [ ]Left [ ]Bilateral  [ ]Diminished BS [ ] Right [ ]Left [ ]Bilateral  CARDIOVASCULAR:    [X]Regular [ ]Irregular [ ]Tachy  [ ]Juan Pablo [ ]Murmur [ ]Other  GASTROINTESTINAL:  [X]Soft  [ ]Distended   [ ]+BS  [X]Non tender [ ]Tender  [ ]Other [ ]PEG [ ]OGT/ NGT   Last BM:   GENITOURINARY:  [X]Normal [ ]Incontinent   [ ]Oliguria/Anuria   [ ]Daley  MUSCULOSKELETAL:   [X]Normal   [ ]Weakness  [ ]Bed/Wheelchair bound [ ]Edema  NEUROLOGIC:   [ ]No focal deficits  [X] Cognitive impairment  [ ] Dysphagia [ ]Dysarthria [ ] Paresis [ ]Other   SKIN:   [X]Normal  [ ]Rash  [ ]Other  [ ]Pressure ulcer(s) [ ]y [ ]n present on admission    LABS: None new    RADIOLOGY & ADDITIONAL STUDIES: None new    REFERRALS:   [ ]Chaplaincy  [ ]Hospice  [ ]Child Life  [X]Social Work  [ ]Case management [ ]Holistic Therapy

## 2022-08-19 NOTE — PROGRESS NOTE ADULT - SUBJECTIVE AND OBJECTIVE BOX
Medicine Progress Note     SUBJECTIVE / OVERNIGHT EVENTS:  O/N events: Had minced and moist diet yesterday with daughter at bedside. Started seroquel 50 BID  Patient was seen and examined at bedside, sleeping comfortably  Otherwise negative ROS    MEDICATIONS  (STANDING):  amLODIPine   Tablet 5 milliGRAM(s) Oral every 24 hours  diVALproex Sprinkle 250 milliGRAM(s) Oral every 12 hours  enoxaparin Injectable 40 milliGRAM(s) SubCutaneous every 24 hours  escitalopram 5 milliGRAM(s) Oral every 24 hours  QUEtiapine 50 milliGRAM(s) Oral two times a day    MEDICATIONS  (PRN):  aluminum hydroxide/magnesium hydroxide/simethicone Suspension 30 milliLiter(s) Oral every 4 hours PRN Dyspepsia  haloperidol    Injectable 0.5 milliGRAM(s) IntraMuscular every 8 hours PRN for agitation  melatonin 3 milliGRAM(s) Oral at bedtime PRN Insomnia    CAPILLARY BLOOD GLUCOSE            OBJECTIVE:  Vital Signs Last 24 Hrs  T(C): 36.7 (19 Aug 2022 06:08), Max: 36.7 (19 Aug 2022 06:08)  T(F): 98.1 (19 Aug 2022 06:08), Max: 98.1 (19 Aug 2022 06:08)  HR: 89 (19 Aug 2022 06:08) (89 - 114)  BP: 131/64 (19 Aug 2022 06:08) (105/61 - 131/64)  BP(mean): --  RR: 17 (19 Aug 2022 06:08) (17 - 17)  SpO2: 98% (19 Aug 2022 06:08) (98% - 98%)    Parameters below as of 19 Aug 2022 06:08  Patient On (Oxygen Delivery Method): room air        PHYSICAL EXAM:  General: sleeping comfortably unable to be aroused, NAD  Head: NC/AT; MMM; PERRL; EOMI;  Neck: Supple; no JVD  Respiratory: CTAB; no wheezes/rales/rhonchi  Cardiovascular: Regular rhythm/rate; S1/S2+, no murmurs, rubs gallops   Gastrointestinal: Soft; NTND; bowel sounds normal and present  Extremities: WWP; no edema/cyanosis  Neurological: CNII-XII grossly intact; no obvious focal deficits  Skin: Clean and intact. Good skin turgor. Without open wounds and sores  I&O's Summary    18 Aug 2022 07:01  -  19 Aug 2022 07:00  --------------------------------------------------------  IN: 0 mL / OUT: 1 mL / NET: -1 mL        LABS:    declined labs              RADIOLOGY & ADDITIONAL TESTS:  Imaging from Last 24 Hours: Medicine Progress Note     SUBJECTIVE / OVERNIGHT EVENTS:  O/N events: Had minced and moist diet yesterday with daughter at bedside. Started seroquel 50 BID  Patient was seen and examined at bedside, sleeping comfortably. Took half of medications with ice cream this AM. Otherwise negative ROS    MEDICATIONS  (STANDING):  amLODIPine   Tablet 5 milliGRAM(s) Oral every 24 hours  diVALproex Sprinkle 250 milliGRAM(s) Oral every 12 hours  enoxaparin Injectable 40 milliGRAM(s) SubCutaneous every 24 hours  escitalopram 5 milliGRAM(s) Oral every 24 hours  QUEtiapine 50 milliGRAM(s) Oral two times a day    MEDICATIONS  (PRN):  aluminum hydroxide/magnesium hydroxide/simethicone Suspension 30 milliLiter(s) Oral every 4 hours PRN Dyspepsia  haloperidol    Injectable 0.5 milliGRAM(s) IntraMuscular every 8 hours PRN for agitation  melatonin 3 milliGRAM(s) Oral at bedtime PRN Insomnia    CAPILLARY BLOOD GLUCOSE            OBJECTIVE:  Vital Signs Last 24 Hrs  T(C): 36.7 (19 Aug 2022 06:08), Max: 36.7 (19 Aug 2022 06:08)  T(F): 98.1 (19 Aug 2022 06:08), Max: 98.1 (19 Aug 2022 06:08)  HR: 89 (19 Aug 2022 06:08) (89 - 114)  BP: 131/64 (19 Aug 2022 06:08) (105/61 - 131/64)  BP(mean): --  RR: 17 (19 Aug 2022 06:08) (17 - 17)  SpO2: 98% (19 Aug 2022 06:08) (98% - 98%)    Parameters below as of 19 Aug 2022 06:08  Patient On (Oxygen Delivery Method): room air        PHYSICAL EXAM:  General: sleeping comfortably unable to be aroused, NAD  Head: NC/AT; MMM; PERRL; EOMI;  Neck: Supple; no JVD  Respiratory: CTAB; no wheezes/rales/rhonchi  Cardiovascular: Regular rhythm/rate; S1/S2+, no murmurs, rubs gallops   Gastrointestinal: Soft; NTND; bowel sounds normal and present  Extremities: WWP; no edema/cyanosis  Neurological: CNII-XII grossly intact; no obvious focal deficits  Skin: Clean and intact. Good skin turgor. Without open wounds and sores  I&O's Summary    18 Aug 2022 07:01  -  19 Aug 2022 07:00  --------------------------------------------------------  IN: 0 mL / OUT: 1 mL / NET: -1 mL        LABS:    declined labs              RADIOLOGY & ADDITIONAL TESTS:  Imaging from Last 24 Hours:

## 2022-08-20 PROCEDURE — 99232 SBSQ HOSP IP/OBS MODERATE 35: CPT | Mod: GC

## 2022-08-20 RX ORDER — QUETIAPINE FUMARATE 200 MG/1
25 TABLET, FILM COATED ORAL
Refills: 0 | Status: DISCONTINUED | OUTPATIENT
Start: 2022-08-20 | End: 2022-08-22

## 2022-08-20 RX ORDER — QUETIAPINE FUMARATE 200 MG/1
12.5 TABLET, FILM COATED ORAL EVERY 6 HOURS
Refills: 0 | Status: DISCONTINUED | OUTPATIENT
Start: 2022-08-20 | End: 2022-08-24

## 2022-08-20 RX ADMIN — ENOXAPARIN SODIUM 40 MILLIGRAM(S): 100 INJECTION SUBCUTANEOUS at 10:07

## 2022-08-20 RX ADMIN — QUETIAPINE FUMARATE 25 MILLIGRAM(S): 200 TABLET, FILM COATED ORAL at 22:29

## 2022-08-20 RX ADMIN — QUETIAPINE FUMARATE 50 MILLIGRAM(S): 200 TABLET, FILM COATED ORAL at 10:06

## 2022-08-20 RX ADMIN — DIVALPROEX SODIUM 250 MILLIGRAM(S): 500 TABLET, DELAYED RELEASE ORAL at 10:07

## 2022-08-20 RX ADMIN — HALOPERIDOL DECANOATE 0.5 MILLIGRAM(S): 100 INJECTION INTRAMUSCULAR at 04:42

## 2022-08-20 RX ADMIN — DIVALPROEX SODIUM 250 MILLIGRAM(S): 500 TABLET, DELAYED RELEASE ORAL at 22:28

## 2022-08-20 RX ADMIN — ESCITALOPRAM OXALATE 5 MILLIGRAM(S): 10 TABLET, FILM COATED ORAL at 09:17

## 2022-08-20 RX ADMIN — AMLODIPINE BESYLATE 5 MILLIGRAM(S): 2.5 TABLET ORAL at 09:16

## 2022-08-20 NOTE — PROGRESS NOTE ADULT - SUBJECTIVE AND OBJECTIVE BOX
OVERNIGHT EVENTS: Required bladder scan and straight cath x1    SUBJECTIVE:  Patient seen and examined at bedside.  Sleeping comfortably, awakes readily. Calm. Does not answer ROS and appears comfortable    Vital Signs Last 12 Hrs  T(F): 97.6 (08-20-22 @ 09:00), Max: 98 (08-20-22 @ 05:16)  HR: 81 (08-20-22 @ 09:00) (81 - 88)  BP: 158/78 (08-20-22 @ 09:00) (119/67 - 158/78)  BP(mean): --  RR: 17 (08-20-22 @ 09:00) (17 - 18)  SpO2: 97% (08-20-22 @ 09:00) (96% - 97%)  I&O's Summary      PHYSICAL EXAM:  Constitutional: NAD, comfortable in bed, elderly female.  HEENT: NC/AT, PERRLA, EOMI, no conjunctival pallor or scleral icterus, MMM  Neck: Supple, no JVD  Respiratory: CTA B/L. No w/r/r.   Cardiovascular: RRR, normal S1 and S2, no m/r/g.   Gastrointestinal: +BS, soft NTND, no guarding or rebound tenderness, no palpable masses   Extremities: wwp; no cyanosis, clubbing or edema.   Vascular: Pulses equal and strong throughout.   Neurological: AAOx1, moves all extremities spontaneously,  5/5 strength throughout   Skin: No gross skin abnormalities or rashes        LABS:                  RADIOLOGY & ADDITIONAL TESTS:    MEDICATIONS  (STANDING):  amLODIPine   Tablet 5 milliGRAM(s) Oral every 24 hours  diVALproex Sprinkle 250 milliGRAM(s) Oral every 12 hours  enoxaparin Injectable 40 milliGRAM(s) SubCutaneous every 24 hours  escitalopram 5 milliGRAM(s) Oral every 24 hours  pantoprazole    Tablet 40 milliGRAM(s) Oral before breakfast  QUEtiapine 25 milliGRAM(s) Oral two times a day    MEDICATIONS  (PRN):  aluminum hydroxide/magnesium hydroxide/simethicone Suspension 30 milliLiter(s) Oral every 4 hours PRN Dyspepsia  melatonin 3 milliGRAM(s) Oral at bedtime PRN Insomnia  QUEtiapine 12.5 milliGRAM(s) Oral every 6 hours PRN agitation

## 2022-08-20 NOTE — PHYSICAL THERAPY INITIAL EVALUATION ADULT - THERAPY FREQUENCY, PT EVAL
Patient educated on frequency of inpatient physical therapy at [facility], patient verbalized understanding./2-3x/week Patient educated on frequency of inpatient physical therapy at [facility], patient verbalized understanding./2-3x/week Patient educated on frequency of inpatient physical therapy at [facility], patient verbalized understanding./2-3x/week

## 2022-08-20 NOTE — PROGRESS NOTE ADULT - PROBLEM SELECTOR PLAN 4
Patient with urinary retention no admission which likely contributed to symptoms.   Pt able to void on her own, PVR <200 thus far     - cw bladder scan q6, straight cath if PVR>300

## 2022-08-20 NOTE — PROGRESS NOTE ADULT - PROBLEM SELECTOR PLAN 1
Pt came in for acute agitation from [facility].  Resting comfortably, now tolerating PO medications with ice cream    Plan  - IV valproate 250 BID transitioned to depakote sprinkles 250 BID   - c/w seroquel 25 BID with 12.5 PRN QHS  - c/w melatonin 3mg  - IM haldol 0.5 TID standing changed to PRN   - holding home Lexapro 5mg qd, olanzapine 5mg qd  - PRN agitation: haldol 0.5 TID, quetiapine 12.5mg po Q6H  - Obtain EKG to monitor QTc Pt came in for acute agitation from [facility].  Resting comfortably, now tolerating PO medications with ice cream    Plan  - IV valproate 250 BID transitioned to depakote sprinkles 250 BID   - c/w seroquel 25 BID with 12.5 PRN QHS  - c/w melatonin 3mg  - IM haldol 0.5 TID standing changed to PRN   - holding home Lexapro 5mg qd, olanzapine 5mg qd  - PRN agitation: haldol 0.5 TID, quetiapine 12.5mg po Q6H  - Obtain EKG to monitor QTc Pt came in for acute agitation from [facility].  Resting comfortably, now tolerating PO medications with ice cream    Plan  - IV valproate 250 BID transitioned to depakote sprinkles 250 BID   - c/w seroquel 25 BID with 12.5 PRN QHS  - c/w melatonin 3mg  - IM haldol 0.5 TID standing changed to PRN   - holding home Lexapro 5mg qd, olanzapine 5mg qd  - PRN agitation: haldol 0.5 TID, quetiapine 12.5mg po Q6H  - Obtain EKG to monitor QTc

## 2022-08-20 NOTE — PHYSICAL THERAPY INITIAL EVALUATION ADULT - GENERAL OBSERVATIONS, REHAB EVAL
PT IE completed. Chart reviewed. Pt received semi-supine, NAD, +IV heplock, +1:1. RN Kae cleared pt for PT.

## 2022-08-20 NOTE — BH CONSULTATION LIAISON PROGRESS NOTE - NSBHATTESTBILLINGAW_PSY_A_CORE
[id number] Inpatient care - low complexity - 15 minutes [id number] Inpatient care - low complexity - 15 minutes [id number] Inpatient care - low complexity - 15 minutes
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
## 2022-08-20 NOTE — PHYSICAL THERAPY INITIAL EVALUATION ADULT - IMPAIRMENTS CONTRIBUTING TO GAIT DEVIATIONS, PT EVAL
agitation, combative/impaired balance/cognition/narrow base of support/impaired postural control/decreased strength

## 2022-08-20 NOTE — PROGRESS NOTE ADULT - SUBJECTIVE AND OBJECTIVE BOX
O/N Events:    Subjective/ROS: Patient seen and examined at bedside.     Denies Fever/Chills, HA, CP, SOB, n/v, changes in bowel/urinary habits.  12pt ROS otherwise negative.    VITALS  Vital Signs Last 24 Hrs  T(C): 36.7 (20 Aug 2022 05:16), Max: 36.7 (19 Aug 2022 21:18)  T(F): 98 (20 Aug 2022 05:16), Max: 98.1 (19 Aug 2022 21:18)  HR: 88 (20 Aug 2022 05:16) (85 - 88)  BP: 119/67 (20 Aug 2022 05:16) (100/69 - 119/67)  BP(mean): --  RR: 18 (20 Aug 2022 05:16) (17 - 18)  SpO2: 96% (20 Aug 2022 05:16) (96% - 96%)    Parameters below as of 20 Aug 2022 05:16  Patient On (Oxygen Delivery Method): room air        CAPILLARY BLOOD GLUCOSE          PHYSICAL EXAM  General: NAD  Head: NC/AT; MMM; PERRL; EOMI;  Neck: Supple; no JVD  Respiratory: CTAB; no wheezes/rales/rhonchi  Cardiovascular: Regular rhythm/rate; S1/S2+, no murmurs, rubs gallops   Gastrointestinal: Soft; NTND; bowel sounds normal and present  Extremities: WWP; no edema/cyanosis  Neurological: A&Ox3, CNII-XII grossly intact; no obvious focal deficits    MEDICATIONS  (STANDING):  amLODIPine   Tablet 5 milliGRAM(s) Oral every 24 hours  diVALproex Sprinkle 250 milliGRAM(s) Oral every 12 hours  enoxaparin Injectable 40 milliGRAM(s) SubCutaneous every 24 hours  escitalopram 5 milliGRAM(s) Oral every 24 hours  pantoprazole    Tablet 40 milliGRAM(s) Oral before breakfast  QUEtiapine 50 milliGRAM(s) Oral two times a day    MEDICATIONS  (PRN):  aluminum hydroxide/magnesium hydroxide/simethicone Suspension 30 milliLiter(s) Oral every 4 hours PRN Dyspepsia  haloperidol    Injectable 0.5 milliGRAM(s) IntraMuscular every 8 hours PRN for agitation  melatonin 3 milliGRAM(s) Oral at bedtime PRN Insomnia      No Known Allergies      LABS                      IMAGING/EKG/ETC

## 2022-08-20 NOTE — BH CONSULTATION LIAISON PROGRESS NOTE - NSBHFUPINTERVALHXFT_PSY_A_CORE
Pt maintained on 1:1 observation for agitation. Received one PRN dose of 0.5mg haloperidol at 1218pm yesterday per MAR. Started back on oral quetiapine and VPA yesterday PM. Per sitter this morning, pt was intermittently agitated overnight, kicking and biting at staff, calm since her shift began at 7am.    On interview this morning, pt asleep and did not awaken to voice or light tactile stimuli and additional interview deferred.  Chart and nursing notes reviewed.  Patient received haldol 0.5 mg IM on 08/19/22 at 19:00 and on 08/20/22 at 4 am for agitation.  The patient was woken up for a procedure at 4 am which led to confusion and agitation.  The patient has been restless but redirectable this morning.  The CO 1:1 and the patient's daughter, Sridevi ([phone]) is at bedside.      On evaluation, the patient is restless, disoriented to person, place, time and situation, and demonstrating poor behavioral control but redirectable.  The patient is calling for her late  and addressing her daughter with Stephanie (the name of her sister).  The patient is unable to engage meaningfully in the interview but states that this writer "talks to much" and states that she wants to go home.  The patient's daughter states that her mother was living on a memory care unit at [facility] and acutely decompensated in early June.  She states that her mother had been doing well on Lexapro and Seroquel at the time but then refused the medications for six days.  The patient's daughter states that, when she returned to her mother after a week, she was "a shell of who she was on June 5th."  She states that the patient's outpatient geriatrician/palliative care physician is Dr. Crystal.  She states that she is currently visiting potential skilled nursing facilities for her mother.  She states that her mother has been paranoid and suspicious since June and she states that she was told that, at the assisted living facility, she was "going into other people's rooms, confused and eventually starting hitting people."  The patient's daughter is provided psychoeducation regarding neurocognitive disorders and medications.  All questions and concerns addressed.        Pt maintained on 1:1 observation for agitation. Received one PRN dose of 0.5mg haloperidol at 1218pm yesterday per MAR. Started back on oral quetiapine and VPA yesterday PM. Per sitter this morning, pt was intermittently agitated overnight, kicking and biting at staff, calm since her shift began at 7am.     Chart and nursing notes reviewed.  Patient received haldol 0.5 mg IM on 08/19/22 at 19:00 and on 08/20/22 at 4 am for agitation.  The patient was woken up for a procedure at 4 am which led to confusion and agitation.  The patient has been restless but redirectable this morning.  The CO 1:1 and the patient's daughter, Sridevi ([phone]) is at bedside.      On evaluation, the patient is restless, disoriented to person, place, time and situation, and demonstrating poor behavioral control but redirectable.  The patient is calling for her late  and addressing her daughter with Stephanie (the name of her sister).  The patient is unable to engage meaningfully in the interview but states that this writer "talks to much" and states that she wants to go home.  The patient's daughter states that her mother was living on a memory care unit at [facility] and acutely decompensated in early June.  She states that her mother had been doing well on Lexapro and Seroquel at the time but then refused the medications for six days.  The patient's daughter states that, when she returned to her mother after a week, she was "a shell of who she was on June 5th."  She states that the patient's outpatient geriatrician/palliative care physician is Dr. Crystal.  She states that she is currently visiting potential skilled nursing facilities for her mother.  She states that her mother has been paranoid and suspicious since June and she states that she was told that, at the assisted living facility, she was "going into other people's rooms, confused and eventually starting hitting people."  The patient's daughter is provided psychoeducation regarding neurocognitive disorders and medications.  All questions and concerns addressed.        Pt maintained on 1:1 observation for agitation. Received one PRN dose of 0.5mg haloperidol at 1218pm yesterday per MAR. Started back on oral quetiapine and VPA yesterday PM. Per sitter this morning, pt was intermittently agitated overnight, kicking and biting at staff, calm since her shift began at 7am.     Chart and nursing notes reviewed.  Patient received haldol 0.5 mg IM on 08/19/22 at 19:00 and on 08/20/22 at 4 am for agitation.  The patient was woken up for a procedure at 4 am which led to confusion and agitation.  The patient has been restless but redirectable this morning.  The CO 1:1 and the patient's daughter, Sridevi ([phone]) is at bedside.      On evaluation, the patient is restless, disoriented to person, place, time and situation, and demonstrating poor behavioral control but redirectable.  The patient is calling for her late  and addressing her daughter with Stephanie (the name of her sister).  The patient is unable to engage meaningfully in the interview but states that this writer "talks to much" and states that she wants to go home.  The patient's daughter states that her mother was living on a memory care unit at [facility] and acutely decompensated in early June.  She states that her mother had been doing well on Lexapro and Seroquel at the time but then refused the medications for six days.  The patient's daughter states that, when she returned to her mother after a week, she was "a shell of who she was on June 5th."  She states that the patient's outpatient geriatrician/palliative care physician is Dr. Crystal.  She states that she is currently visiting potential skilled nursing facilities for her mother.  She states that her mother has been paranoid and suspicious since June and she states that she was told that, at the assisted living facility, she was "going into other people's rooms, confused and eventually starting hitting people."  The patient's daughter is provided psychoeducation regarding neurocognitive disorders and medications.  All questions and concerns addressed.        Pt maintained on 1:1 observation for agitation. Received one PRN dose of 0.5mg haloperidol at 1218pm yesterday per MAR. Started back on oral quetiapine and VPA yesterday PM. Per sitter this morning, pt was intermittently agitated overnight, kicking and biting at staff, calm since her shift began at 7am.

## 2022-08-20 NOTE — PROGRESS NOTE ADULT - PROBLEM SELECTOR PLAN 3
Pt started on minced and moist diet, tolerating well. s/p D5, 1/2 NS standing when NPO  Severe protein calorie malnutrition    Plan  - continue to monitor

## 2022-08-20 NOTE — BH CONSULTATION LIAISON PROGRESS NOTE - NSBHCONSULTMEDAGITATION_PSY_A_CORE FT
Haldol 0.5-1mg IV/IM PRN - Recommend additional quetiapine 12.5mg PO Q6H PRN for acute agitation that is not verbally redirectable  - Recommend Haldol 0.5-1mg IV/IM PRN if refuses PO

## 2022-08-20 NOTE — PHYSICAL THERAPY INITIAL EVALUATION ADULT - ADDITIONAL COMMENTS
PT unable to accurately assess social history and PLOF 2/2 pt confusion and poor historian. As per chart, pt received from assisted living. When asked where pt lives, pt responded "[place]". PT unable to accurately assess social history and PLOF 2/2 pt confusion and poor historian. As per chart, pt received from assisted living. When asked where pt lives, pt responded "[place]". PT unable to accurately assess social history and PLOF 2/2 pt confusion and poor historian. As per chart, pt received from assisted living. When asked where pt lives, pt responded "[place]".

## 2022-08-20 NOTE — PROGRESS NOTE ADULT - ASSESSMENT
88 y/o female PMH HLD, GERD, Alzheimer's and possible frontotemporal dementia, sciatica sent from assisted living for agitation.   86 y/o female PMH HLD, GERD, Alzheimer's and possible frontotemporal dementia, sciatica sent from assisted living for agitation.

## 2022-08-20 NOTE — BH CONSULTATION LIAISON PROGRESS NOTE - OTHER
lying back in bed, gait not assessed asleep calm, asleep recently impaired lying back in bed or sitting, gait not assessed restless, unable to engage meaningfully in interview

## 2022-08-20 NOTE — PHYSICAL THERAPY INITIAL EVALUATION ADULT - PERTINENT HX OF CURRENT PROBLEM, REHAB EVAL
88 y/o female PMH HLD, GERD, Alzheimer's and possible frontotemporal dementia, sciatica sent from assisted living for agitation.

## 2022-08-20 NOTE — PHYSICAL THERAPY INITIAL EVALUATION ADULT - GAIT DEVIATIONS NOTED, PT EVAL
pt using UE to furniture surf; forward flexed posture/decreased chayo/decreased step length/decreased weight-shifting ability

## 2022-08-21 LAB
ALBUMIN SERPL ELPH-MCNC: 3.9 G/DL — SIGNIFICANT CHANGE UP (ref 3.3–5)
ALP SERPL-CCNC: 43 U/L — SIGNIFICANT CHANGE UP (ref 40–120)
ALT FLD-CCNC: 34 U/L — SIGNIFICANT CHANGE UP (ref 10–45)
ANION GAP SERPL CALC-SCNC: 10 MMOL/L — SIGNIFICANT CHANGE UP (ref 5–17)
AST SERPL-CCNC: 62 U/L — HIGH (ref 10–40)
BILIRUB DIRECT SERPL-MCNC: <0.2 MG/DL — SIGNIFICANT CHANGE UP (ref 0–0.3)
BILIRUB INDIRECT FLD-MCNC: SIGNIFICANT CHANGE UP MG/DL (ref 0.2–1)
BILIRUB SERPL-MCNC: 0.5 MG/DL — SIGNIFICANT CHANGE UP (ref 0.2–1.2)
BUN SERPL-MCNC: 18 MG/DL — SIGNIFICANT CHANGE UP (ref 7–23)
CALCIUM SERPL-MCNC: 9 MG/DL — SIGNIFICANT CHANGE UP (ref 8.4–10.5)
CHLORIDE SERPL-SCNC: 106 MMOL/L — SIGNIFICANT CHANGE UP (ref 96–108)
CHOLEST SERPL-MCNC: 226 MG/DL — HIGH
CO2 SERPL-SCNC: 25 MMOL/L — SIGNIFICANT CHANGE UP (ref 22–31)
CREAT SERPL-MCNC: 0.79 MG/DL — SIGNIFICANT CHANGE UP (ref 0.5–1.3)
EGFR: 73 ML/MIN/1.73M2 — SIGNIFICANT CHANGE UP
GLUCOSE SERPL-MCNC: 94 MG/DL — SIGNIFICANT CHANGE UP (ref 70–99)
HCT VFR BLD CALC: 38.5 % — SIGNIFICANT CHANGE UP (ref 34.5–45)
HDLC SERPL-MCNC: 49 MG/DL — LOW
HGB BLD-MCNC: 13.3 G/DL — SIGNIFICANT CHANGE UP (ref 11.5–15.5)
LIPID PNL WITH DIRECT LDL SERPL: 151 MG/DL — HIGH
MAGNESIUM SERPL-MCNC: 2.1 MG/DL — SIGNIFICANT CHANGE UP (ref 1.6–2.6)
MCHC RBC-ENTMCNC: 30.6 PG — SIGNIFICANT CHANGE UP (ref 27–34)
MCHC RBC-ENTMCNC: 34.5 GM/DL — SIGNIFICANT CHANGE UP (ref 32–36)
MCV RBC AUTO: 88.5 FL — SIGNIFICANT CHANGE UP (ref 80–100)
NON HDL CHOLESTEROL: 177 MG/DL — HIGH
NRBC # BLD: 0 /100 WBCS — SIGNIFICANT CHANGE UP (ref 0–0)
PHOSPHATE SERPL-MCNC: 3 MG/DL — SIGNIFICANT CHANGE UP (ref 2.5–4.5)
PLATELET # BLD AUTO: 193 K/UL — SIGNIFICANT CHANGE UP (ref 150–400)
POTASSIUM SERPL-MCNC: 3.5 MMOL/L — SIGNIFICANT CHANGE UP (ref 3.5–5.3)
POTASSIUM SERPL-SCNC: 3.5 MMOL/L — SIGNIFICANT CHANGE UP (ref 3.5–5.3)
PROT SERPL-MCNC: 6.3 G/DL — SIGNIFICANT CHANGE UP (ref 6–8.3)
RBC # BLD: 4.35 M/UL — SIGNIFICANT CHANGE UP (ref 3.8–5.2)
RBC # FLD: 12.4 % — SIGNIFICANT CHANGE UP (ref 10.3–14.5)
SODIUM SERPL-SCNC: 141 MMOL/L — SIGNIFICANT CHANGE UP (ref 135–145)
TRIGL SERPL-MCNC: 132 MG/DL — SIGNIFICANT CHANGE UP
WBC # BLD: 5.9 K/UL — SIGNIFICANT CHANGE UP (ref 3.8–10.5)
WBC # FLD AUTO: 5.9 K/UL — SIGNIFICANT CHANGE UP (ref 3.8–10.5)

## 2022-08-21 PROCEDURE — 99232 SBSQ HOSP IP/OBS MODERATE 35: CPT | Mod: GC

## 2022-08-21 RX ORDER — HALOPERIDOL DECANOATE 100 MG/ML
0.5 INJECTION INTRAMUSCULAR ONCE
Refills: 0 | Status: COMPLETED | OUTPATIENT
Start: 2022-08-21 | End: 2022-08-21

## 2022-08-21 RX ORDER — SENNA PLUS 8.6 MG/1
2 TABLET ORAL AT BEDTIME
Refills: 0 | Status: DISCONTINUED | OUTPATIENT
Start: 2022-08-21 | End: 2022-08-26

## 2022-08-21 RX ORDER — POLYETHYLENE GLYCOL 3350 17 G/17G
17 POWDER, FOR SOLUTION ORAL DAILY
Refills: 0 | Status: DISCONTINUED | OUTPATIENT
Start: 2022-08-21 | End: 2022-08-26

## 2022-08-21 RX ADMIN — AMLODIPINE BESYLATE 5 MILLIGRAM(S): 2.5 TABLET ORAL at 09:58

## 2022-08-21 RX ADMIN — ENOXAPARIN SODIUM 40 MILLIGRAM(S): 100 INJECTION SUBCUTANEOUS at 10:00

## 2022-08-21 RX ADMIN — QUETIAPINE FUMARATE 25 MILLIGRAM(S): 200 TABLET, FILM COATED ORAL at 10:00

## 2022-08-21 RX ADMIN — ESCITALOPRAM OXALATE 5 MILLIGRAM(S): 10 TABLET, FILM COATED ORAL at 09:59

## 2022-08-21 RX ADMIN — PANTOPRAZOLE SODIUM 40 MILLIGRAM(S): 20 TABLET, DELAYED RELEASE ORAL at 06:02

## 2022-08-21 RX ADMIN — DIVALPROEX SODIUM 250 MILLIGRAM(S): 500 TABLET, DELAYED RELEASE ORAL at 22:25

## 2022-08-21 RX ADMIN — QUETIAPINE FUMARATE 25 MILLIGRAM(S): 200 TABLET, FILM COATED ORAL at 22:04

## 2022-08-21 RX ADMIN — SENNA PLUS 2 TABLET(S): 8.6 TABLET ORAL at 22:04

## 2022-08-21 RX ADMIN — DIVALPROEX SODIUM 250 MILLIGRAM(S): 500 TABLET, DELAYED RELEASE ORAL at 10:00

## 2022-08-21 NOTE — PROGRESS NOTE ADULT - PROBLEM SELECTOR PLAN 4
Patient with urinary retention no admission which likely contributed to symptoms.   Pt able to void on her own, PVR <100 thus far     - cw bladder scan q6, straight cath if PVR>300

## 2022-08-21 NOTE — PROGRESS NOTE ADULT - ATTENDING COMMENTS
88 y/o female PMH HLD, GERD, Alzheimer's and possible frontotemporal dementia, sciatica sent from assisted living for agitation    Patient remains calm today, has not required PRN medications in > 24 hours    #Alzheimer's with agitation  Psychiatry following, appreciate recommendations for agitation in the setting of alzheimers  - c/w seroquel 25mg PO BID, and 12.5mg PO QHS PRN  - cleared for minced and moist diet  - depakote converted to PO sprinkles, tolerating  - PT eval recommends [facility]  - no need for 1:1    #Severe protein calorie malnutrition  - dietician eval, add ensure    Remainder of plan as above    Dispo: [facility] 86 y/o female PMH HLD, GERD, Alzheimer's and possible frontotemporal dementia, sciatica sent from assisted living for agitation    Patient remains calm today, has not required PRN medications in > 24 hours    #Alzheimer's with agitation  Psychiatry following, appreciate recommendations for agitation in the setting of alzheimers  - c/w seroquel 25mg PO BID, and 12.5mg PO QHS PRN  - cleared for minced and moist diet  - depakote converted to PO sprinkles, tolerating  - PT eval recommends [facility]  - no need for 1:1    #Severe protein calorie malnutrition  - dietician eval, add ensure    Remainder of plan as above    Dispo: [facility]

## 2022-08-21 NOTE — BH CONSULTATION LIAISON PROGRESS NOTE - NSBHFUPINTERVALHXFT_PSY_A_CORE
Chart and nursing notes reviewed.  No acute events overnight and VSS.  The patient has not received any PRN medications since last evaluation on 08/20/22.  The patient was sleeping quietly this morning. Nursing staff at bedside.      On evaluation, the patient is sleeping quietly in bed.  Nursing staff providing enhanced supervision reports that the patient was somewhat restless earlier this morning but was redirectable.  She states that the patient did not eat her breakfast this morning.  The patient has been asleep for the past two hours.  No episodes of agitation.  Per nursing staff, the patient remains disoriented and AOx1.  All questions and concerns addressed.

## 2022-08-21 NOTE — BH CONSULTATION LIAISON PROGRESS NOTE - NSBHASSESSMENTFT_PSY_ALL_CORE
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 circadian rhythm regulation  --delirium precautions (frequent redirection, familiar objects, family members present, lights off at night, avoid benzos, opioids, anticholinergic medications)  --continue to rule out medical conditions causing delirium, including any areas of pain (e.g., back), urinary retention  --[facility] will continue to follow

## 2022-08-21 NOTE — BH CONSULTATION LIAISON PROGRESS NOTE - NSBHCONSULTMEDAGITATION_PSY_A_CORE FT
- Recommend additional quetiapine 12.5mg PO Q6H PRN for acute agitation that is not verbally redirectable  - Recommend Haldol 0.5-1mg IV/IM PRN if refuses PO

## 2022-08-21 NOTE — PROGRESS NOTE ADULT - SUBJECTIVE AND OBJECTIVE BOX
Medicine Progress Note    SUBJECTIVE / OVERNIGHT EVENTS:  O/N events: No acute events overnight   Patient was seen and examined at bedside, sleeping comfortably.   Otherwise negative ROS    MEDICATIONS  (STANDING):  amLODIPine   Tablet 5 milliGRAM(s) Oral every 24 hours  diVALproex Sprinkle 250 milliGRAM(s) Oral every 12 hours  enoxaparin Injectable 40 milliGRAM(s) SubCutaneous every 24 hours  escitalopram 5 milliGRAM(s) Oral every 24 hours  pantoprazole    Tablet 40 milliGRAM(s) Oral before breakfast  QUEtiapine 25 milliGRAM(s) Oral two times a day    MEDICATIONS  (PRN):  aluminum hydroxide/magnesium hydroxide/simethicone Suspension 30 milliLiter(s) Oral every 4 hours PRN Dyspepsia  melatonin 3 milliGRAM(s) Oral at bedtime PRN Insomnia  QUEtiapine 12.5 milliGRAM(s) Oral every 6 hours PRN agitation    CAPILLARY BLOOD GLUCOSE            OBJECTIVE:  Vital Signs Last 24 Hrs  T(C): 36.5 (21 Aug 2022 10:12), Max: 36.7 (20 Aug 2022 20:48)  T(F): 97.7 (21 Aug 2022 10:12), Max: 98 (20 Aug 2022 20:48)  HR: 83 (21 Aug 2022 10:12) (77 - 99)  BP: 128/74 (21 Aug 2022 10:12) (122/69 - 158/72)  BP(mean): --  RR: 19 (21 Aug 2022 10:12) (18 - 19)  SpO2: 98% (21 Aug 2022 10:12) (94% - 99%)    Parameters below as of 21 Aug 2022 10:12  Patient On (Oxygen Delivery Method): room air        PHYSICAL EXAM:  General: AAOx3, NAD  Head: NC/AT; MMM; PERRL; EOMI;  Neck: Supple; no JVD  Respiratory: CTAB; no wheezes/rales/rhonchi  Cardiovascular: Regular rhythm/rate; S1/S2+, no murmurs, rubs gallops   Gastrointestinal: Soft; NTND; bowel sounds normal and present  Extremities: WWP; no edema/cyanosis  Neurological: CNII-XII grossly intact; no obvious focal deficits    I&O's Summary    20 Aug 2022 07:01  -  21 Aug 2022 07:00  --------------------------------------------------------  IN: 180 mL / OUT: 0 mL / NET: 180 mL        LABS:                        13.3   5.90  )-----------( 193      ( 21 Aug 2022 07:00 )             38.5     08-21    141  |  106  |  18  ----------------------------<  94  3.5   |  25  |  0.79    Ca    9.0      21 Aug 2022 07:00  Phos  3.0     08-21  Mg     2.1     08-21                    RADIOLOGY & ADDITIONAL TESTS:  Imaging from Last 24 Hours: Medicine Progress Note    SUBJECTIVE / OVERNIGHT EVENTS:  O/N events: No acute events overnight. PVR 10 at 06:00  Patient was seen and examined at bedside, sleeping comfortably.   Otherwise negative ROS    MEDICATIONS  (STANDING):  amLODIPine   Tablet 5 milliGRAM(s) Oral every 24 hours  diVALproex Sprinkle 250 milliGRAM(s) Oral every 12 hours  enoxaparin Injectable 40 milliGRAM(s) SubCutaneous every 24 hours  escitalopram 5 milliGRAM(s) Oral every 24 hours  pantoprazole    Tablet 40 milliGRAM(s) Oral before breakfast  QUEtiapine 25 milliGRAM(s) Oral two times a day    MEDICATIONS  (PRN):  aluminum hydroxide/magnesium hydroxide/simethicone Suspension 30 milliLiter(s) Oral every 4 hours PRN Dyspepsia  melatonin 3 milliGRAM(s) Oral at bedtime PRN Insomnia  QUEtiapine 12.5 milliGRAM(s) Oral every 6 hours PRN agitation    CAPILLARY BLOOD GLUCOSE            OBJECTIVE:  Vital Signs Last 24 Hrs  T(C): 36.5 (21 Aug 2022 10:12), Max: 36.7 (20 Aug 2022 20:48)  T(F): 97.7 (21 Aug 2022 10:12), Max: 98 (20 Aug 2022 20:48)  HR: 83 (21 Aug 2022 10:12) (77 - 99)  BP: 128/74 (21 Aug 2022 10:12) (122/69 - 158/72)  BP(mean): --  RR: 19 (21 Aug 2022 10:12) (18 - 19)  SpO2: 98% (21 Aug 2022 10:12) (94% - 99%)    Parameters below as of 21 Aug 2022 10:12  Patient On (Oxygen Delivery Method): room air        PHYSICAL EXAM:  General: AAOx3, NAD  Head: NC/AT; MMM; PERRL; EOMI;  Neck: Supple; no JVD  Respiratory: CTAB; no wheezes/rales/rhonchi  Cardiovascular: Regular rhythm/rate; S1/S2+, no murmurs, rubs gallops   Gastrointestinal: Soft; NTND; bowel sounds normal and present  Extremities: WWP; no edema/cyanosis  Neurological: CNII-XII grossly intact; no obvious focal deficits    I&O's Summary    20 Aug 2022 07:01  -  21 Aug 2022 07:00  --------------------------------------------------------  IN: 180 mL / OUT: 0 mL / NET: 180 mL        LABS:                        13.3   5.90  )-----------( 193      ( 21 Aug 2022 07:00 )             38.5     08-21    141  |  106  |  18  ----------------------------<  94  3.5   |  25  |  0.79    Ca    9.0      21 Aug 2022 07:00  Phos  3.0     08-21  Mg     2.1     08-21                    RADIOLOGY & ADDITIONAL TESTS:  Imaging from Last 24 Hours:

## 2022-08-21 NOTE — BH CONSULTATION LIAISON PROGRESS NOTE - NSBHCHARTREVIEWINVESTIGATE_PSY_A_CORE FT
no new ekg seen
no new ekg seen
no new ecg seen
no new EKG seen in EMR  EKG from admission with QTc 492

## 2022-08-21 NOTE — PROGRESS NOTE ADULT - PROBLEM SELECTOR PLAN 1
Pt came in for acute agitation from [facility].  Resting comfortably, now tolerating PO medications with ice cream    Plan  - c/w depakote sprinkles 250 BID   - c/w seroquel 25 BID with 12.5 PRN QHS  - c/w melatonin 3mg  - IM haldol 0.5 TID standing changed to PRN   - holding home Lexapro 5mg qd, olanzapine 5mg qd  - PRN agitation: haldol 0.5 TID, quetiapine 12.5mg po Q6H  - Obtain EKG to monitor QTc Pt came in for acute agitation from [facility].  Resting comfortably, now tolerating PO medications with ice cream    Plan  - c/w depakote sprinkles 250 BID   - c/w seroquel 25 BID with 12.5 PRN QHS  - c/w melatonin 3mg  - IM haldol 0.5 TID standing changed to PRN   - holding home Lexapro 5mg qd, olanzapine 5mg qd  - PRN agitation: haldol 0.5 TID, quetiapine 12.5mg po Q6H  - Obtain EKG to monitor QTc Pt came in for acute agitation from [facility].  Resting comfortably, now tolerating PO medications with ice cream    Plan  - c/w depakote sprinkles 250 BID   - c/w seroquel 25 BID with 12.5 PRN QHS  - c/w melatonin 3mg  - IM haldol 0.5 TID standing changed to PRN   - holding home Lexapro 5mg qd, olanzapine 5mg qd  - PRN agitation: haldol 0.5 TID, quetiapine 12.5mg po Q6H  - Obtain EKG to monitor QTc

## 2022-08-21 NOTE — BH CONSULTATION LIAISON PROGRESS NOTE - NSBHATTESTBILLINGAW_PSY_A_CORE
[id number] Inpatient care - low complexity - 15 minutes [id number] Inpatient care - low complexity - 15 minutes [id number] Inpatient care - low complexity - 15 minutes

## 2022-08-22 DIAGNOSIS — G30.9 ALZHEIMER'S DISEASE, UNSPECIFIED: ICD-10-CM

## 2022-08-22 PROCEDURE — 99232 SBSQ HOSP IP/OBS MODERATE 35: CPT | Mod: GC

## 2022-08-22 PROCEDURE — 99231 SBSQ HOSP IP/OBS SF/LOW 25: CPT

## 2022-08-22 RX ORDER — QUETIAPINE FUMARATE 200 MG/1
50 TABLET, FILM COATED ORAL EVERY 24 HOURS
Refills: 0 | Status: DISCONTINUED | OUTPATIENT
Start: 2022-08-22 | End: 2022-08-23

## 2022-08-22 RX ORDER — QUETIAPINE FUMARATE 200 MG/1
25 TABLET, FILM COATED ORAL EVERY 24 HOURS
Refills: 0 | Status: DISCONTINUED | OUTPATIENT
Start: 2022-08-23 | End: 2022-08-23

## 2022-08-22 RX ORDER — HALOPERIDOL DECANOATE 100 MG/ML
0.5 INJECTION INTRAMUSCULAR ONCE
Refills: 0 | Status: DISCONTINUED | OUTPATIENT
Start: 2022-08-22 | End: 2022-08-22

## 2022-08-22 RX ADMIN — DIVALPROEX SODIUM 250 MILLIGRAM(S): 500 TABLET, DELAYED RELEASE ORAL at 14:12

## 2022-08-22 RX ADMIN — QUETIAPINE FUMARATE 50 MILLIGRAM(S): 200 TABLET, FILM COATED ORAL at 23:00

## 2022-08-22 RX ADMIN — QUETIAPINE FUMARATE 25 MILLIGRAM(S): 200 TABLET, FILM COATED ORAL at 14:11

## 2022-08-22 RX ADMIN — AMLODIPINE BESYLATE 5 MILLIGRAM(S): 2.5 TABLET ORAL at 14:11

## 2022-08-22 RX ADMIN — ENOXAPARIN SODIUM 40 MILLIGRAM(S): 100 INJECTION SUBCUTANEOUS at 14:12

## 2022-08-22 RX ADMIN — PANTOPRAZOLE SODIUM 40 MILLIGRAM(S): 20 TABLET, DELAYED RELEASE ORAL at 14:11

## 2022-08-22 RX ADMIN — HALOPERIDOL DECANOATE 0.5 MILLIGRAM(S): 100 INJECTION INTRAMUSCULAR at 01:25

## 2022-08-22 RX ADMIN — ESCITALOPRAM OXALATE 5 MILLIGRAM(S): 10 TABLET, FILM COATED ORAL at 14:12

## 2022-08-22 RX ADMIN — POLYETHYLENE GLYCOL 3350 17 GRAM(S): 17 POWDER, FOR SOLUTION ORAL at 14:12

## 2022-08-22 NOTE — PROGRESS NOTE ADULT - ASSESSMENT
86 y/o female PMH HLD, GERD, Alzheimer's and possible frontotemporal dementia, sciatica sent from assisted living for agitation.   88 y/o female PMH HLD, GERD, Alzheimer's and possible frontotemporal dementia, sciatica sent from assisted living for agitation.   86 y/o female PMH HLD, GERD, Alzheimer's and possible frontotemporal dementia, sciatica sent from assisted living for agitation in setting of dementia.

## 2022-08-22 NOTE — PROGRESS NOTE ADULT - PROBLEM SELECTOR PLAN 7
Patient with history of GERD, currently not on any medication.      - PO protonix Patient with history of sciatica, per daughter she frequently has back pain but not currently on any medications.  It's possibly back pain was contributing to behavioral disturbance but she was unable to express.      - If patient remains agitated after resolution of urinary retention, will try Toradol IV

## 2022-08-22 NOTE — PROGRESS NOTE ADULT - PROBLEM SELECTOR PLAN 6
Patient with history of hyperlipidemia, not currently on any medications.      - f/u lipid panel Patient with history of hyperlipidemia, not currently on any medications.   Due to age, no clinical indication for starting statin Patient with history of GERD, currently not on any medication.      - PO protonix

## 2022-08-22 NOTE — BH CONSULTATION LIAISON PROGRESS NOTE - NSBHATTESTBILLINGAW_PSY_A_CORE
[id number] Inpatient care - low complexity - 15 minutes [id number] Inpatient care - low complexity - 15 minutes [id number] Inpatient care - low complexity - 15 minutes

## 2022-08-22 NOTE — BH CONSULTATION LIAISON PROGRESS NOTE - NSBHFUPINTERVALHXFT_PSY_A_CORE
Patient reportedly agitated overnight Received Haldol 0.5mg IM overnight. On initial evaluation, patient asleep in bed appears comfortable. On waking, she is calm. On asking if anything is bothering her she shakes her head "no" but otherwise is mumbling incomprehensibly.

## 2022-08-22 NOTE — PROGRESS NOTE ADULT - PROBLEM SELECTOR PLAN 9
F: none  E: replete PRN  N: minced and moist  GI: PO protonix 40  DVT: Lovenox 40  Code: Full  Dispo: [facility] F: none  E: replete PRN  N: minced and moist, ensure 2/D  GI: PO protonix 40  DVT: Lovenox 40  Code: Full  Dispo: [facility]

## 2022-08-22 NOTE — PROGRESS NOTE ADULT - PROBLEM SELECTOR PLAN 8
Patient with history of sciatica, per daughter she frequently has back pain but not currently on any medications.  It's possibly back pain was contributing to behavioral disturbance but she was unable to express.      - If patient remains agitated after resolution of urinary retention, will try Toradol IV F: none  E: replete PRN  N: minced and moist, ensure 2/D  GI: PO protonix 40  DVT: Lovenox 40  Code: Full  Dispo: [facility]

## 2022-08-22 NOTE — PROGRESS NOTE ADULT - ATTENDING COMMENTS
86 y/o female PMH HLD, GERD, Alzheimer's and possible frontotemporal dementia, sciatica sent from assisted living for agitation    Patient remains calm today, has not required PRN medications in > 24 hours    #Alzheimer's with agitation  Psychiatry following, appreciate recommendations for agitation in the setting of alzheimers  - c/w seroquel 25mg PO BID, and 12.5mg PO QHS PRN  - cleared for minced and moist diet  - depakote converted to PO sprinkles, tolerating  - PT eval recommends [facility], will discuss with psych regarding [facility] admission for continued titration of medications for behavioral improvements  - no need for 1:1    #Severe protein calorie malnutrition  - dietician jensen, add ensure    Remainder of plan as above    Dispo: [facility] vs [facility] 88 y/o female PMH HLD, GERD, Alzheimer's and possible frontotemporal dementia, sciatica sent from assisted living for agitation    #Alzheimer's with agitation  Psychiatry following, appreciate recommendations for agitation in the setting of alzheimers  - c/w seroquel 25mg PO BID, and 12.5mg PO QHS PRN, will titrate as per psych  - cleared for minced and moist diet  - depakote converted to PO sprinkles, tolerating  - PT eval recommends [facility], will discuss with psych regarding [facility] admission for continued titration of medications for behavioral improvements  - no need for 1:1    #Severe protein calorie malnutrition  - dietician jensen, add ensure    Remainder of plan as above    Dispo: [facility] vs [facility]

## 2022-08-22 NOTE — PROGRESS NOTE ADULT - PROBLEM SELECTOR PLAN 4
Patient with urinary retention no admission which likely contributed to symptoms.   Pt able to void on her own, PVR <100 thus far     - cw bladder scan q6, straight cath if PVR>300 BP has been wnl    - c/w amlodipine 5 qd
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
## 2022-08-22 NOTE — PROGRESS NOTE ADULT - PROBLEM SELECTOR PLAN 5
BP has been wnl    - c/w amlodipine 5 qd Patient with history of hyperlipidemia, not currently on any medications.   Due to age, no clinical indication for starting statin

## 2022-08-22 NOTE — PROGRESS NOTE ADULT - PROBLEM SELECTOR PLAN 3
Pt started on minced and moist diet, tolerating well. s/p D5, 1/2 NS standing when NPO  Severe protein calorie malnutrition    Plan  - continue to monitor Patient with urinary retention no admission which likely contributed to symptoms.   Pt able to void on her own, PVR <100 thus far     - cw bladder scan q6, straight cath if PVR>300

## 2022-08-22 NOTE — PROGRESS NOTE ADULT - PROBLEM SELECTOR PLAN 1
Pt came in for acute agitation from [facility].  Resting comfortably, now tolerating PO medications with coffee or ice cream    Plan  - c/w depakote sprinkles 250 BID   - c/w seroquel 25 BID with 12.5 PRN QHS  - c/w melatonin 3mg  - IM haldol 0.5 TID standing changed to PRN   - holding home Lexapro 5mg qd, olanzapine 5mg qd  - PRN agitation: haldol 0.5 TID, quetiapine 12.5mg po Q6H  - Obtain EKG to monitor QTc Pt came in for acute agitation from [facility].  Resting comfortably, now tolerating PO medications with coffee or ice cream    Plan  - c/w depakote sprinkles 250 BID   - c/w seroquel 25 BID with 12.5 PRN QHS  - c/w melatonin 3mg  - IM haldol 0.5 TID standing changed to PRN   - holding home Lexapro 5mg qd, olanzapine 5mg qd  - PRN agitation: haldol 0.5 TID, quetiapine 12.5mg po Q6H  - Obtain EKG to monitor QTc Pt came in for acute agitation from [facility].  Resting comfortably, now tolerating PO medications with coffee or ice cream    Plan  - c/w depakote sprinkles 250 BID   - c/w seroquel 25 BID with 12.5 PRN QHS  - c/w melatonin 3mg  - IM haldol 0.5 TID standing changed to PRN   - holding home Lexapro 5mg qd, olanzapine 5mg qd  - PRN agitation: haldol 0.5 TID, quetiapine 12.5mg po Q6H  - Obtain EKG to monitor QTc Patient with Alzheimer's and possible frontotemporal dementia (no previous MRIs) presenting with worsening agitation, likely in setting of urinary retention, medication noncompliance, sciatica.  Pt came in for acute agitation from [facility].  Resting comfortably, now tolerating PO medications with coffee or ice cream    Plan  - c/w depakote sprinkles 250 BID   - c/w seroquel 25 BID with 12.5 PRN QHS  - c/w melatonin 3mg  - IM haldol 0.5 TID standing changed to PRN   - holding home Lexapro 5mg qd, olanzapine 5mg qd  - PRN agitation: haldol 0.5 TID, quetiapine 12.5mg po Q6H  - Obtain EKG to monitor QTc Patient with Alzheimer's and possible frontotemporal dementia (no previous MRIs) presenting with worsening agitation, likely in setting of urinary retention, medication noncompliance, sciatica.  Pt came in for acute agitation from [facility].  Resting comfortably, now tolerating PO medications with coffee or ice cream    Plan  - c/w depakote sprinkles 250 BID   - c/w seroquel 25 BID with 12.5 PRN QHS  - c/w melatonin 3mg  - IM haldol 0.5 TID standing changed to PRN   - holding home Lexapro 5mg qd, olanzapine 5mg qd  - PRN agitation: haldol 0.5 TID, quetiapine 12.5mg po Q6H  - Obtain EKG to monitor QTc Patient with Alzheimer's and possible frontotemporal dementia (no previous MRIs) presenting with worsening agitation, likely in setting of urinary retention, medication noncompliance, sciatica.  Pt came in for acute agitation from [facility].  Resting comfortably, now tolerating PO medications with coffee or ice cream    Plan  - c/w depakote sprinkles 250 BID   - c/w seroquel 25 BID with 12.5 PRN QHS  - c/w melatonin 3mg  - IM haldol 0.5 TID standing changed to PRN   - holding home Lexapro 5mg qd, olanzapine 5mg qd  - PRN agitation: haldol 0.5 TID, quetiapine 12.5mg po Q6H  - Obtain EKG to monitor QTc
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
## 2022-08-22 NOTE — PROGRESS NOTE ADULT - SUBJECTIVE AND OBJECTIVE BOX
Medicine Progress Note     SUBJECTIVE / OVERNIGHT EVENTS:  O/N events: No acute events  Patient was seen and examined at bedside, sleeping comfortably. Tolerating minced and moist diet   Otherwise negative ROS    MEDICATIONS  (STANDING):  amLODIPine   Tablet 5 milliGRAM(s) Oral every 24 hours  diVALproex Sprinkle 250 milliGRAM(s) Oral every 12 hours  enoxaparin Injectable 40 milliGRAM(s) SubCutaneous every 24 hours  escitalopram 5 milliGRAM(s) Oral every 24 hours  pantoprazole    Tablet 40 milliGRAM(s) Oral before breakfast  polyethylene glycol 3350 17 Gram(s) Oral daily  QUEtiapine 25 milliGRAM(s) Oral two times a day  senna 2 Tablet(s) Oral at bedtime    MEDICATIONS  (PRN):  aluminum hydroxide/magnesium hydroxide/simethicone Suspension 30 milliLiter(s) Oral every 4 hours PRN Dyspepsia  melatonin 3 milliGRAM(s) Oral at bedtime PRN Insomnia  QUEtiapine 12.5 milliGRAM(s) Oral every 6 hours PRN agitation    CAPILLARY BLOOD GLUCOSE            OBJECTIVE:  Vital Signs Last 24 Hrs  T(C): 36.6 (22 Aug 2022 07:03), Max: 36.6 (22 Aug 2022 07:03)  T(F): 97.9 (22 Aug 2022 07:03), Max: 97.9 (22 Aug 2022 07:03)  HR: 68 (22 Aug 2022 07:03) (68 - 78)  BP: 150/69 (22 Aug 2022 07:03) (130/72 - 150/69)  BP(mean): --  RR: 18 (22 Aug 2022 07:03) (18 - 18)  SpO2: 98% (22 Aug 2022 07:03) (93% - 98%)    Parameters below as of 22 Aug 2022 07:03  Patient On (Oxygen Delivery Method): room air        PHYSICAL EXAM:  General: AAOx3, NAD  Head: NC/AT; MMM; PERRL; EOMI;  Neck: Supple; no JVD  Respiratory: CTAB; no wheezes/rales/rhonchi  Cardiovascular: Regular rhythm/rate; S1/S2+, no murmurs, rubs gallops   Gastrointestinal: Soft; NTND; bowel sounds normal and present  Extremities: WWP; no edema/cyanosis  Neurological: CNII-XII grossly intact; no obvious focal deficits  Skin: Clean and intact. Good skin turgor. Without open wounds and sores  I&O's Summary      LABS:                      RADIOLOGY & ADDITIONAL TESTS:  Imaging from Last 24 Hours: Medicine Progress Note     SUBJECTIVE / OVERNIGHT EVENTS:  O/N events: No acute events  Patient was seen and examined at bedside, sleeping comfortably. Tolerating minced and moist diet. Voiding independently with low PVR   Otherwise negative ROS    MEDICATIONS  (STANDING):  amLODIPine   Tablet 5 milliGRAM(s) Oral every 24 hours  diVALproex Sprinkle 250 milliGRAM(s) Oral every 12 hours  enoxaparin Injectable 40 milliGRAM(s) SubCutaneous every 24 hours  escitalopram 5 milliGRAM(s) Oral every 24 hours  pantoprazole    Tablet 40 milliGRAM(s) Oral before breakfast  polyethylene glycol 3350 17 Gram(s) Oral daily  QUEtiapine 25 milliGRAM(s) Oral two times a day  senna 2 Tablet(s) Oral at bedtime    MEDICATIONS  (PRN):  aluminum hydroxide/magnesium hydroxide/simethicone Suspension 30 milliLiter(s) Oral every 4 hours PRN Dyspepsia  melatonin 3 milliGRAM(s) Oral at bedtime PRN Insomnia  QUEtiapine 12.5 milliGRAM(s) Oral every 6 hours PRN agitation    CAPILLARY BLOOD GLUCOSE            OBJECTIVE:  Vital Signs Last 24 Hrs  T(C): 36.6 (22 Aug 2022 07:03), Max: 36.6 (22 Aug 2022 07:03)  T(F): 97.9 (22 Aug 2022 07:03), Max: 97.9 (22 Aug 2022 07:03)  HR: 68 (22 Aug 2022 07:03) (68 - 78)  BP: 150/69 (22 Aug 2022 07:03) (130/72 - 150/69)  BP(mean): --  RR: 18 (22 Aug 2022 07:03) (18 - 18)  SpO2: 98% (22 Aug 2022 07:03) (93% - 98%)    Parameters below as of 22 Aug 2022 07:03  Patient On (Oxygen Delivery Method): room air        PHYSICAL EXAM:  General: AAOx person, NAD  Head: NC/AT; MMM; PERRL; EOMI;  Neck: Supple; no JVD  Respiratory: CTAB; no wheezes/rales/rhonchi  Cardiovascular: Regular rhythm/rate; S1/S2+, no murmurs, rubs gallops   Gastrointestinal: Soft; NTND; bowel sounds normal and present  Extremities: WWP; no edema/cyanosis  Neurological: CNII-XII grossly intact; no obvious focal deficits  Skin: Clean and intact. Good skin turgor. Without open wounds and sores  I&O's Summary      LABS:                      RADIOLOGY & ADDITIONAL TESTS:  Imaging from Last 24 Hours:

## 2022-08-22 NOTE — PROGRESS NOTE ADULT - PROBLEM SELECTOR PLAN 2
Patient with Alzheimer's and possible frontotemporal dementia (no previous MRIs) presenting with worsening agitation, likely in setting of urinary retention, medication noncompliance, sciatica.      - plan as above Pt started on minced and moist diet, tolerating well. s/p D5, 1/2 NS standing when NPO  Severe protein calorie malnutrition    Plan  - continue to supplement Ensure 2cans/day

## 2022-08-23 LAB
SARS-COV-2 RNA SPEC QL NAA+PROBE: NEGATIVE — SIGNIFICANT CHANGE UP

## 2022-08-23 PROCEDURE — 99231 SBSQ HOSP IP/OBS SF/LOW 25: CPT

## 2022-08-23 PROCEDURE — 99233 SBSQ HOSP IP/OBS HIGH 50: CPT | Mod: GC

## 2022-08-23 RX ORDER — QUETIAPINE FUMARATE 200 MG/1
1 TABLET, FILM COATED ORAL
Qty: 0 | Refills: 0 | DISCHARGE
Start: 2022-08-23

## 2022-08-23 RX ORDER — DIVALPROEX SODIUM 500 MG/1
2 TABLET, DELAYED RELEASE ORAL
Qty: 0 | Refills: 0 | DISCHARGE
Start: 2022-08-23

## 2022-08-23 RX ORDER — QUETIAPINE FUMARATE 200 MG/1
50 TABLET, FILM COATED ORAL EVERY 12 HOURS
Refills: 0 | Status: DISCONTINUED | OUTPATIENT
Start: 2022-08-23 | End: 2022-08-26

## 2022-08-23 RX ADMIN — SENNA PLUS 2 TABLET(S): 8.6 TABLET ORAL at 22:22

## 2022-08-23 RX ADMIN — DIVALPROEX SODIUM 250 MILLIGRAM(S): 500 TABLET, DELAYED RELEASE ORAL at 10:03

## 2022-08-23 RX ADMIN — ESCITALOPRAM OXALATE 5 MILLIGRAM(S): 10 TABLET, FILM COATED ORAL at 09:57

## 2022-08-23 RX ADMIN — DIVALPROEX SODIUM 250 MILLIGRAM(S): 500 TABLET, DELAYED RELEASE ORAL at 22:22

## 2022-08-23 RX ADMIN — ENOXAPARIN SODIUM 40 MILLIGRAM(S): 100 INJECTION SUBCUTANEOUS at 09:58

## 2022-08-23 RX ADMIN — POLYETHYLENE GLYCOL 3350 17 GRAM(S): 17 POWDER, FOR SOLUTION ORAL at 13:30

## 2022-08-23 RX ADMIN — PANTOPRAZOLE SODIUM 40 MILLIGRAM(S): 20 TABLET, DELAYED RELEASE ORAL at 10:02

## 2022-08-23 RX ADMIN — AMLODIPINE BESYLATE 5 MILLIGRAM(S): 2.5 TABLET ORAL at 09:57

## 2022-08-23 RX ADMIN — QUETIAPINE FUMARATE 50 MILLIGRAM(S): 200 TABLET, FILM COATED ORAL at 22:22

## 2022-08-23 RX ADMIN — QUETIAPINE FUMARATE 25 MILLIGRAM(S): 200 TABLET, FILM COATED ORAL at 10:16

## 2022-08-23 NOTE — PROGRESS NOTE ADULT - SUBJECTIVE AND OBJECTIVE BOX
Medicine Progress Note    SUBJECTIVE / OVERNIGHT EVENTS:  O/N events: No acute events   Patient was seen and examined at bedside, sleeping comfortably.   Otherwise negative ROS    MEDICATIONS  (STANDING):  amLODIPine   Tablet 5 milliGRAM(s) Oral every 24 hours  diVALproex Sprinkle 250 milliGRAM(s) Oral every 12 hours  enoxaparin Injectable 40 milliGRAM(s) SubCutaneous every 24 hours  escitalopram 5 milliGRAM(s) Oral every 24 hours  pantoprazole    Tablet 40 milliGRAM(s) Oral before breakfast  polyethylene glycol 3350 17 Gram(s) Oral daily  QUEtiapine 50 milliGRAM(s) Oral every 24 hours  QUEtiapine 25 milliGRAM(s) Oral every 24 hours  senna 2 Tablet(s) Oral at bedtime    MEDICATIONS  (PRN):  aluminum hydroxide/magnesium hydroxide/simethicone Suspension 30 milliLiter(s) Oral every 4 hours PRN Dyspepsia  melatonin 3 milliGRAM(s) Oral at bedtime PRN Insomnia  QUEtiapine 12.5 milliGRAM(s) Oral every 6 hours PRN agitation    CAPILLARY BLOOD GLUCOSE            OBJECTIVE:  Vital Signs Last 24 Hrs  T(C): 36.4 (23 Aug 2022 09:50), Max: 36.6 (22 Aug 2022 14:18)  T(F): 97.5 (23 Aug 2022 09:50), Max: 97.9 (22 Aug 2022 15:16)  HR: 65 (23 Aug 2022 09:50) (65 - 76)  BP: 129/61 (23 Aug 2022 09:50) (121/56 - 150/71)  BP(mean): --  RR: 17 (23 Aug 2022 09:50) (17 - 19)  SpO2: 97% (23 Aug 2022 09:50) (96% - 100%)    Parameters below as of 23 Aug 2022 09:50  Patient On (Oxygen Delivery Method): room air        PHYSICAL EXAM:  General: AAOx1-2, NAD  Head: NC/AT; MMM; PERRL; EOMI;  Neck: Supple; no JVD  Respiratory: CTAB; no wheezes/rales/rhonchi  Cardiovascular: Regular rhythm/rate; S1/S2+, no murmurs, rubs gallops   Gastrointestinal: Soft; NTND; bowel sounds normal and present  Extremities: WWP; no edema/cyanosis  Neurological: CNII-XII grossly intact; no obvious focal deficits  Skin: Clean and intact. Good skin turgor. Without open wounds and sores  I&O's Summary      LABS:                        RADIOLOGY & ADDITIONAL TESTS:  Imaging from Last 24 Hours:
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
## 2022-08-23 NOTE — BH CONSULTATION LIAISON PROGRESS NOTE - NSBHLOCFT_PSY_A_CORE
Patient asleep
asleep, not arousable to voice or light tactile stimuli
Patient asleep
asleep, not arousable to voice or light tactile stimuli
Patient asleep
asleep, not arousable to light tactile stimuli

## 2022-08-23 NOTE — PROGRESS NOTE ADULT - ATTENDING COMMENTS
88 y/o female PMH HLD, GERD, Alzheimer's and possible frontotemporal dementia, sciatica sent from assisted living for agitation in setting of dementia.      Labs and imaging reviewed    Problem List:   #Alzheimer's Dementia  #Frontotemporal dementia  Rest of problems as above    Plan:  -PT recommending [facility] but functional status improvement  -Continue medication for agitation control  -Patient is much more cooperative with staffing, becomes startled when woken, needs redirection     Dispo: [facility] vs Memory Unit for long term care

## 2022-08-23 NOTE — BH CONSULTATION LIAISON PROGRESS NOTE - NSBHCONSULTWORKUPEKGFT_PSY_ALL_CORE
when able for QTc monitoring

## 2022-08-23 NOTE — PROGRESS NOTE ADULT - PROBLEM SELECTOR PLAN 2
Pt started on minced and moist diet, tolerating well. s/p D5, 1/2 NS standing when NPO  Severe protein calorie malnutrition    Plan  - continue to supplement Ensure 2cans/day

## 2022-08-23 NOTE — PROGRESS NOTE ADULT - ASSESSMENT
86 y/o female PMH HLD, GERD, Alzheimer's and possible frontotemporal dementia, sciatica sent from assisted living for agitation in setting of dementia.   88 y/o female PMH HLD, GERD, Alzheimer's and possible frontotemporal dementia, sciatica sent from assisted living for agitation in setting of dementia.

## 2022-08-23 NOTE — PROGRESS NOTE ADULT - PROBLEM SELECTOR PLAN 8
F: none  E: replete PRN  N: minced and moist, ensure 2/D  GI: PO protonix 40  DVT: Lovenox 40  Code: Full  Dispo: [id number]

## 2022-08-23 NOTE — PROGRESS NOTE ADULT - PROBLEM SELECTOR PLAN 1
Patient with Alzheimer's and possible frontotemporal dementia (no previous MRIs) presenting with worsening agitation, likely in setting of urinary retention, medication noncompliance, sciatica.  Pt came in for acute agitation from [facility].  Resting comfortably, now tolerating PO medications with coffee or ice cream    Plan  - c/w depakote sprinkles 250 BID   - c/w seroquel 25mg AM 50mg PM with 12.5 PRN QHS  - c/w melatonin 3mg  - holding home Lexapro 5mg qd, olanzapine 5mg qd  - PRN agitation: haldol 0.5 TID,seroquel 12.5mg po Q6H  - Obtain EKG to monitor QTc Patient with Alzheimer's and possible frontotemporal dementia (no previous MRIs) presenting with worsening agitation, likely in setting of urinary retention, medication noncompliance, sciatica.  Pt came in for acute agitation from [facility]  Resting comfortably, now tolerating PO medications with coffee or ice cream    Plan  - c/w depakote sprinkles 250 BID   - c/w seroquel 25mg AM 50mg PM with 12.5 PRN QHS  - c/w melatonin 3mg  - holding home Lexapro 5mg qd, olanzapine 5mg qd  - PRN agitation: haldol 0.5 TID,seroquel 12.5mg po Q6H  - Obtain EKG to monitor QTc Patient with Alzheimer's and possible frontotemporal dementia (no previous MRIs) presenting with worsening agitation, likely in setting of urinary retention, medication noncompliance, sciatica.  Pt came in for acute agitation from [facility].  Resting comfortably, now tolerating PO medications with coffee or ice cream    Plan  - c/w depakote sprinkles 250 BID   - c/w seroquel 25mg AM 50mg PM with 12.5 PRN QHS  - c/w melatonin 3mg  - holding home Lexapro 5mg qd, olanzapine 5mg qd  - PRN agitation: haldol 0.5 TID,seroquel 12.5mg po Q6H  - Obtain EKG to monitor QTc

## 2022-08-23 NOTE — BH CONSULTATION LIAISON PROGRESS NOTE - ADDITIONAL DETAILS / COMMENTS
cognition, insight and judgment recently impaired
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
## 2022-08-23 NOTE — BH CONSULTATION LIAISON PROGRESS NOTE - OTHER
Intermittently hostile towards staff. Inbetween episodes calm and mumblels incoherently Unintelligible mumbling

## 2022-08-23 NOTE — PROGRESS NOTE ADULT - PROBLEM SELECTOR PLAN 5
Patient with history of hyperlipidemia, not currently on any medications.   Due to age, no clinical indication for starting statin

## 2022-08-23 NOTE — BH CONSULTATION LIAISON PROGRESS NOTE - NSBHATTESTBILLINGAW_PSY_A_CORE
[id number] Inpatient care - low complexity - 15 minutes [id number] Inpatient care - low complexity - 15 minutes [id number] Inpatient care - low complexity - 15 minutes
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
## 2022-08-24 PROCEDURE — 99231 SBSQ HOSP IP/OBS SF/LOW 25: CPT

## 2022-08-24 PROCEDURE — 99232 SBSQ HOSP IP/OBS MODERATE 35: CPT | Mod: GC

## 2022-08-24 RX ORDER — QUETIAPINE FUMARATE 200 MG/1
25 TABLET, FILM COATED ORAL EVERY 6 HOURS
Refills: 0 | Status: DISCONTINUED | OUTPATIENT
Start: 2022-08-24 | End: 2022-08-26

## 2022-08-24 RX ADMIN — DIVALPROEX SODIUM 250 MILLIGRAM(S): 500 TABLET, DELAYED RELEASE ORAL at 22:11

## 2022-08-24 RX ADMIN — AMLODIPINE BESYLATE 5 MILLIGRAM(S): 2.5 TABLET ORAL at 09:30

## 2022-08-24 RX ADMIN — ESCITALOPRAM OXALATE 5 MILLIGRAM(S): 10 TABLET, FILM COATED ORAL at 09:30

## 2022-08-24 RX ADMIN — QUETIAPINE FUMARATE 50 MILLIGRAM(S): 200 TABLET, FILM COATED ORAL at 22:11

## 2022-08-24 RX ADMIN — QUETIAPINE FUMARATE 50 MILLIGRAM(S): 200 TABLET, FILM COATED ORAL at 09:33

## 2022-08-24 RX ADMIN — SENNA PLUS 2 TABLET(S): 8.6 TABLET ORAL at 22:11

## 2022-08-24 RX ADMIN — DIVALPROEX SODIUM 250 MILLIGRAM(S): 500 TABLET, DELAYED RELEASE ORAL at 09:30

## 2022-08-24 RX ADMIN — ENOXAPARIN SODIUM 40 MILLIGRAM(S): 100 INJECTION SUBCUTANEOUS at 09:29

## 2022-08-24 RX ADMIN — POLYETHYLENE GLYCOL 3350 17 GRAM(S): 17 POWDER, FOR SOLUTION ORAL at 09:34
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
## 2022-08-24 NOTE — PROGRESS NOTE ADULT - PROBLEM SELECTOR PLAN 2
Pt started on minced and moist diet, tolerating well. s/p D5, 1/2 NS standing when NPO  Severe protein calorie malnutrition    Plan  - Minced and moist diet, encourage PO intake  - continue to supplement Ensure 2cans/day

## 2022-08-24 NOTE — PROGRESS NOTE ADULT - PROBLEM SELECTOR PLAN 6
Patient with history of GERD, currently not on any medication.    Plan:  - PO protonix, spit out AM protonix this AM
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
## 2022-08-24 NOTE — BH CONSULTATION LIAISON PROGRESS NOTE - NSBHFUPINTERVALHXFT_PSY_A_CORE
Patient was seen at bedside. She presented accompanied by her daughter. Pt was calm, cooperative, smiling, responding to verbal re-direction from daughter when she asked to remove her hospital band.   Per family member, patient feels very comfortable when they visit as she is able to recognize them.   Chart review shows that pt hasn't received any prns nor had any episodes of agitation during the last 24hrs.

## 2022-08-24 NOTE — BH CONSULTATION LIAISON PROGRESS NOTE - NSBHATTESTBILLINGAW_PSY_A_CORE
[id number] Inpatient care - low complexity - 15 minutes [id number] Inpatient care - low complexity - 15 minutes [id number] Inpatient care - low complexity - 15 minutes

## 2022-08-24 NOTE — PROGRESS NOTE ADULT - ATTENDING COMMENTS
88 y/o female PMH HLD, GERD, Alzheimer's and possible frontotemporal dementia, sciatica sent from assisted living for agitation    #Alzheimer's  #Frontotemporal dementia  Psychiatry following, appreciate recommendations for agitation in the setting of alzheimers  - c/w seroquel 50mg PO BID, and 25mg PO q6 PRN, will titrate as per psych  - cleared for minced and moist diet  - depakote converted to PO sprinkles, tolerating  - PT eval recommends [facility]  - frequent reorientation  - melatonin QHS    #Severe protein calorie malnutrition  - dietician eval, add ensure    Remainder of plan as above    Dispo: [facility] 86 y/o female PMH HLD, GERD, Alzheimer's and possible frontotemporal dementia, sciatica sent from assisted living for agitation    #Alzheimer's  #Frontotemporal dementia  Psychiatry following, appreciate recommendations for agitation in the setting of alzheimers  - c/w seroquel 50mg PO BID, and 25mg PO q6 PRN, will titrate as per psych  - cleared for minced and moist diet  - depakote converted to PO sprinkles, tolerating  - PT eval recommends [facility]  - frequent reorientation  - melatonin QHS    #Severe protein calorie malnutrition  - dietician eval, add ensure    Remainder of plan as above    Dispo: [facility]

## 2022-08-24 NOTE — CHART NOTE - NSCHARTNOTEFT_GEN_A_CORE
Admitting Diagnosis:   Patient is a 87y old  Female who presents with a chief complaint of Alzheimers with behavioral changes (24 Aug 2022 08:12)    PAST MEDICAL & SURGICAL HISTORY:  HLD (hyperlipidemia)  GERD (gastroesophageal reflux disease)  Dementia with behavioral disturbance  Sciatica    Current Nutrition Order: Minced and Moist diet with Ensure ONS BID (350kcal, 20gPRO per serving)  PO Intake: Good (%) [   ]  Fair (50-75%) [   ] Poor (<25%) [ x ]  GI Issues: within normal limits at this time per flowsheets data  Pain: denies pain/discomfort  Skin Integrity: abrasions noted to elbow, knee, skin; no edema noted; Fredi: 16    Labs:     CAPILLARY BLOOD GLUCOSE  Medications:  MEDICATIONS  (STANDING):  amLODIPine   Tablet 5 milliGRAM(s) Oral every 24 hours  diVALproex Sprinkle 250 milliGRAM(s) Oral every 12 hours  enoxaparin Injectable 40 milliGRAM(s) SubCutaneous every 24 hours  escitalopram 5 milliGRAM(s) Oral every 24 hours  pantoprazole    Tablet 40 milliGRAM(s) Oral before breakfast  polyethylene glycol 3350 17 Gram(s) Oral daily  QUEtiapine 50 milliGRAM(s) Oral every 12 hours  senna 2 Tablet(s) Oral at bedtime    MEDICATIONS  (PRN):  aluminum hydroxide/magnesium hydroxide/simethicone Suspension 30 milliLiter(s) Oral every 4 hours PRN Dyspepsia  melatonin 3 milliGRAM(s) Oral at bedtime PRN Insomnia  QUEtiapine 12.5 milliGRAM(s) Oral every 6 hours PRN agitation    Anthropometrics on admission:   Height for BMI (FEET)	5 Feet  Height for BMI (INCHES)	7 Inch(s)  Height for BMI (CENTIMETERS)170.18 Centimeter(s)  Weight for BMI (lbs)	130.1 lb  Weight for BMI (kg)	59 kg  Body Mass Index	20.3     Weight Change: Daily Weight in k.2 (24 Aug 2022 10:54)  New BMI: 17.3     Estimated energy needs:   Based on Ideal Body weight (135#); needs adjusted for advanced age and malnutrition:  kcal: 30-35kcal/kg IBW = 1841-2148kcal/day   PRO: 1.2-2.0g/kg = 74-123gPRO  Fluid recs per team    Subjective: 88 y/o female PMH HLD, GERD, Alzheimer's and possible frontotemporal dementia, sciatica sent from assisted living for agitation and violent behavior likely due to urinary retention, medication changes, and possibly sciatica    Pt seen on 4UR at bedside; pt was asleep, RD spoke to PCA and pt's daughter, Sridevi. Pt is on a Minced and Moist diet with Ensure ONS BID (350kcal, 20gPRO per serving) with poor PO intakes overall noted. No breakfast eaten yet at the time of RD interview (pt's daughter and PCA were waiting until pt woke up to try breakfast and Ensure ONS r/t pt's agitated behaviors). GI Issues: within normal limits at this time per flowsheets data; no N/V/D, no recent BMs noted by RN but pt has had poor PO intakes; bowel regimen is in place as confirmed by EMR and team. Pain: denies pain/discomfort. Skin Integrity: abrasions noted to elbow, knee, skin; no edema noted; Fredi: 16. RD spoke to pt's daughter in regards to weight status. Recent weight loss of 20# (15% admission body weight) and poor PO intakes signify that pt meets criteria for severe malnutrition in the context of acute illness/injury. RD provided education and encouragement to pt's daughter in regards to PO intakes; pt's daughter endorsed that enteral nutrition would not be appropriate and medical team did not believe parenteral nutrition was indicated at the time. No additional nutritional concerns. RD will remain available. Additional nutrition recommendations below.     Previous Nutrition Diagnosis: Inadequate Oral Intake r/t delayed inpatient oral diet initiation AEB 0-<50% PO intakes since admission     Active [   ]  Resolved [ x ]    If resolved, new PES: Malnutrition of severe degree in the context of acute illness/injury r/t inadequate oral intakes secondary to decrease in appetite AEB significant wt loss of 20#/15% within 9 days since admission, <25% PO intakes over the past 9 days since admission     Goal: Pt to consistently meet at least 75% of EEE via tolerated route that is consistent with GOC; pt will no longer exhibit s/s of malnutrition     Recommendations:  1. Continue with current diet order  >>Ensure High Protein Plus 2x/day TID (350kcal, 20gPRO per serving)   2. Encourage pt to meet nutritional needs as able   3. Monitor PO intakes, trend weights (weekly), monitor skin integrity, monitor labs (electrolytes, CMP), monitor GI fxn   4. Encourage adherence to diet education (reinforce as able)   5. Pain and bowel regimen per team  6. Will continue to assess/honor preferences as able   7. Align nutrition interventions with GOC at all times    Education: Pt's daughter amenable to education. RD provided education to pt's daughter Sridevi in regards to the importance of adequate macro and micronutrients, as well as hydration to support ADLs, maintain energy levels and overall functional/nutritional status. Pt was receptive and verbalized understanding. RD encouraged pt's daughter to continue to encourage pt to increase PO intakes via small meals, snacks, nourishments and ONS. RD will remain available. Additional nutrition recommendations listed below to follow.     Risk Level: High [ x ] Moderate [   ] Low [   ] Admitting Diagnosis:   Patient is a 87y old  Female who presents with a chief complaint of Alzheimers with behavioral changes (24 Aug 2022 08:12)    PAST MEDICAL & SURGICAL HISTORY:  HLD (hyperlipidemia)  GERD (gastroesophageal reflux disease)  Dementia with behavioral disturbance  Sciatica    Current Nutrition Order: Minced and Moist diet with Ensure ONS BID (350kcal, 20gPRO per serving)  PO Intake: Good (%) [   ]  Fair (50-75%) [   ] Poor (<25%) [ x ]  GI Issues: within normal limits at this time per flowsheets data  Pain: denies pain/discomfort  Skin Integrity: abrasions noted to elbow, knee, skin; no edema noted; Fredi: 16    Labs:     CAPILLARY BLOOD GLUCOSE  Medications:  MEDICATIONS  (STANDING):  amLODIPine   Tablet 5 milliGRAM(s) Oral every 24 hours  diVALproex Sprinkle 250 milliGRAM(s) Oral every 12 hours  enoxaparin Injectable 40 milliGRAM(s) SubCutaneous every 24 hours  escitalopram 5 milliGRAM(s) Oral every 24 hours  pantoprazole    Tablet 40 milliGRAM(s) Oral before breakfast  polyethylene glycol 3350 17 Gram(s) Oral daily  QUEtiapine 50 milliGRAM(s) Oral every 12 hours  senna 2 Tablet(s) Oral at bedtime    MEDICATIONS  (PRN):  aluminum hydroxide/magnesium hydroxide/simethicone Suspension 30 milliLiter(s) Oral every 4 hours PRN Dyspepsia  melatonin 3 milliGRAM(s) Oral at bedtime PRN Insomnia  QUEtiapine 12.5 milliGRAM(s) Oral every 6 hours PRN agitation    Anthropometrics on admission:   Height for BMI (FEET)	5 Feet  Height for BMI (INCHES)	7 Inch(s)  Height for BMI (CENTIMETERS)170.18 Centimeter(s)  Weight for BMI (lbs)	130.1 lb  Weight for BMI (kg)	59 kg  Body Mass Index	20.3     Weight Change: Daily Weight in k.2 (24 Aug 2022 10:54)  New BMI: 17.3     Estimated energy needs:   Based on Ideal Body weight (135#); needs adjusted for advanced age and malnutrition:  kcal: 30-35kcal/kg IBW = 1841-2148kcal/day   PRO: 1.2-2.0g/kg = 74-123gPRO  Fluid recs per team    Subjective: 86 y/o female PMH HLD, GERD, Alzheimer's and possible frontotemporal dementia, sciatica sent from assisted living for agitation and violent behavior likely due to urinary retention, medication changes, and possibly sciatica    Pt seen on 4UR at bedside; pt was asleep, RD spoke to PCA and pt's daughter, Sridevi. Pt is on a Minced and Moist diet with Ensure ONS BID (350kcal, 20gPRO per serving) with poor PO intakes overall noted. No breakfast eaten yet at the time of RD interview (pt's daughter and PCA were waiting until pt woke up to try breakfast and Ensure ONS r/t pt's agitated behaviors). GI Issues: within normal limits at this time per flowsheets data; no N/V/D, no recent BMs noted by RN but pt has had poor PO intakes; bowel regimen is in place as confirmed by EMR and team. Pain: denies pain/discomfort. Skin Integrity: abrasions noted to elbow, knee, skin; no edema noted; Fredi: 16. RD spoke to pt's daughter in regards to weight status. Recent weight loss of 20# (15% admission body weight) and poor PO intakes signify that pt meets criteria for severe malnutrition in the context of acute illness/injury. RD provided education and encouragement to pt's daughter in regards to PO intakes; pt's daughter endorsed that enteral nutrition would not be appropriate and medical team did not believe parenteral nutrition was indicated at the time. No additional nutritional concerns. RD will remain available. Additional nutrition recommendations below.     Previous Nutrition Diagnosis: Inadequate Oral Intake r/t delayed inpatient oral diet initiation AEB 0-<50% PO intakes since admission     Active [   ]  Resolved [ x ]    If resolved, new PES: Malnutrition of severe degree in the context of acute illness/injury r/t inadequate oral intakes secondary to decrease in appetite AEB significant wt loss of 20#/15% within 9 days since admission, <25% PO intakes over the past 9 days since admission     Goal: Pt to consistently meet at least 75% of EEE via tolerated route that is consistent with GOC; pt will no longer exhibit s/s of malnutrition     Recommendations:  1. Continue with current diet order  >>Ensure High Protein Plus 2x/day TID (350kcal, 20gPRO per serving)   2. Encourage pt to meet nutritional needs as able   3. Monitor PO intakes, trend weights (weekly), monitor skin integrity, monitor labs (electrolytes, CMP), monitor GI fxn   4. Encourage adherence to diet education (reinforce as able)   5. Pain and bowel regimen per team  6. Will continue to assess/honor preferences as able   7. Align nutrition interventions with GOC at all times    Education: Pt's daughter amenable to education. RD provided education to pt's daughter Sridevi in regards to the importance of adequate macro and micronutrients, as well as hydration to support ADLs, maintain energy levels and overall functional/nutritional status. Pt was receptive and verbalized understanding. RD encouraged pt's daughter to continue to encourage pt to increase PO intakes via small meals, snacks, nourishments and ONS. RD will remain available. Additional nutrition recommendations listed below to follow.     Risk Level: High [ x ] Moderate [   ] Low [   ]

## 2022-08-24 NOTE — BH CONSULTATION LIAISON PROGRESS NOTE - NSBHMSEMUSCLE_PSY_A_CORE
Detail Level: Zone Consent: The patient's consent was obtained including but not limited to risks of crusting, scabbing, blistering, scarring, darker or lighter pigmentary change, recurrence, incomplete removal and infection. Duration Of Freeze Thaw-Cycle (Seconds): 3 Number Of Freeze-Thaw Cycles: 1 freeze-thaw cycle Render Post-Care Instructions In Note?: no Post-Care Instructions: I reviewed with the patient in detail post-care instructions. Patient is to wear sunprotection, and avoid picking at any of the treated lesions. Pt may apply Vaseline to crusted or scabbing areas. Unable to assess

## 2022-08-24 NOTE — PROGRESS NOTE ADULT - PROBLEM SELECTOR PLAN 3
Patient with urinary retention no admission which likely contributed to symptoms.   Pt able to void on her own, PVR <100 thus far, 36 cc on bladder scan today at 12PM  Plan:  - cw bladder scan q6, straight cath if PVR>300

## 2022-08-24 NOTE — PROGRESS NOTE ADULT - PROBLEM SELECTOR PLAN 7
Patient with history of sciatica, per daughter she frequently has back pain but not currently on any medications.  It's possibly back pain was contributing to behavioral disturbance but she was unable to express.    Plan:  - If patient remains agitated after resolution of urinary retention, will try Toradol IV

## 2022-08-24 NOTE — PROGRESS NOTE ADULT - PROBLEM SELECTOR PROBLEM 1
Rintalin      Last Written Prescription Date:  5.20.2020  Last Fill Quantity: 30,   # refills: 0  Last Office Visit: 5.22.2020       Alzheimer's disease with behavioral disturbance

## 2022-08-24 NOTE — PROGRESS NOTE ADULT - PROBLEM SELECTOR PLAN 1
Patient with Alzheimer's and possible frontotemporal dementia (no previous MRIs) presenting with worsening agitation, likely in setting of urinary retention, medication noncompliance, sciatica.  Pt came in for acute agitation from [facility].  Resting comfortably, now tolerating PO medications with coffee or ice cream    Plan  - c/w depakote sprinkles 250 BID   - c/w seroquel 25mg AM 50mg PM with 12.5 PRN QHS  - c/w melatonin 3mg  - holding home Lexapro 5mg qd, olanzapine 5mg qd  - PRN agitation: haldol 0.5 TID,seroquel 12.5mg po Q6H  - Obtain EKG to monitor QTc Patient with Alzheimer's and possible frontotemporal dementia (no previous MRIs) presenting with worsening agitation, likely in setting of urinary retention, medication noncompliance, sciatica.  Pt came in for acute agitation from [facility].  Resting comfortably, now tolerating PO medications with coffee or ice cream    Plan  - c/w depakote sprinkles 250 BID   - c/w seroquel 25mg AM 50mg PM with 12.5 PRN QHS  - c/w melatonin 3mg  - holding home Lexapro 5mg qd, olanzapine 5mg qd  - PRN agitation: haldol 0.5 TID,seroquel 12.5mg po Q6H  - Obtain EKG to monitor QTc Patient with Alzheimer's and possible frontotemporal dementia (no previous MRIs) presenting with worsening agitation, likely in setting of urinary retention, medication noncompliance, sciatica.  Pt came in for acute agitation from [facility].  Resting comfortably, now tolerating PO medications with coffee or ice cream    Plan  - c/w depakote sprinkles 250 BID   - c/w seroquel 25mg AM 50mg PM with 12.5 PRN QHS  - c/w melatonin 3mg  - holding home Lexapro 5mg qd, olanzapine 5mg qd  - PRN agitation: haldol 0.5 TID,seroquel 12.5mg po Q6H  - Obtain EKG to monitor QTc

## 2022-08-25 PROCEDURE — 99231 SBSQ HOSP IP/OBS SF/LOW 25: CPT

## 2022-08-25 PROCEDURE — 99232 SBSQ HOSP IP/OBS MODERATE 35: CPT | Mod: GC

## 2022-08-25 RX ORDER — QUETIAPINE FUMARATE 200 MG/1
1 TABLET, FILM COATED ORAL
Qty: 0 | Refills: 0 | DISCHARGE
Start: 2022-08-25

## 2022-08-25 RX ADMIN — ESCITALOPRAM OXALATE 5 MILLIGRAM(S): 10 TABLET, FILM COATED ORAL at 10:36

## 2022-08-25 RX ADMIN — SENNA PLUS 2 TABLET(S): 8.6 TABLET ORAL at 22:40

## 2022-08-25 RX ADMIN — QUETIAPINE FUMARATE 50 MILLIGRAM(S): 200 TABLET, FILM COATED ORAL at 22:40

## 2022-08-25 RX ADMIN — DIVALPROEX SODIUM 250 MILLIGRAM(S): 500 TABLET, DELAYED RELEASE ORAL at 22:40

## 2022-08-25 RX ADMIN — AMLODIPINE BESYLATE 5 MILLIGRAM(S): 2.5 TABLET ORAL at 10:35

## 2022-08-25 RX ADMIN — ENOXAPARIN SODIUM 40 MILLIGRAM(S): 100 INJECTION SUBCUTANEOUS at 09:34

## 2022-08-25 RX ADMIN — QUETIAPINE FUMARATE 50 MILLIGRAM(S): 200 TABLET, FILM COATED ORAL at 10:38

## 2022-08-25 RX ADMIN — DIVALPROEX SODIUM 250 MILLIGRAM(S): 500 TABLET, DELAYED RELEASE ORAL at 10:36

## 2022-08-25 NOTE — PROGRESS NOTE ADULT - PROBLEM SELECTOR PLAN 3
Patient with urinary retention no admission which likely contributed to symptoms.   Pt able to void on her own, PVR <100 thus far, 36 cc on bladder scan today at 12PM  Plan:  - cw bladder scan q6, straight cath if PVR>300 Patient with urinary retention no admission which likely contributed to symptoms.   Pt able to void on her own, PVR <100 thus far, 131 cc ON  Plan:  - cw bladder scan q6, straight cath if PVR>300

## 2022-08-25 NOTE — BH CONSULTATION LIAISON PROGRESS NOTE - NSBHCHARTREVIEWVS_PSY_A_CORE FT
Vital Signs Last 24 Hrs  T(C): 36.6 (16 Aug 2022 09:05), Max: 36.7 (15 Aug 2022 13:45)  T(F): 97.8 (16 Aug 2022 09:05), Max: 98.1 (15 Aug 2022 13:45)  HR: 75 (16 Aug 2022 09:05) (75 - 124)  BP: 143/78 (16 Aug 2022 09:05) (143/70 - 167/75)  BP(mean): 106 (15 Aug 2022 21:52) (106 - 106)  RR: 18 (16 Aug 2022 09:05) (16 - 20)  SpO2: 97% (16 Aug 2022 09:05) (95% - 98%)    Parameters below as of 16 Aug 2022 09:05  Patient On (Oxygen Delivery Method): room air    
Vital Signs Last 24 Hrs  T(C): 36.5 (24 Aug 2022 08:40), Max: 36.5 (24 Aug 2022 06:01)  T(F): 97.7 (24 Aug 2022 08:40), Max: 97.7 (24 Aug 2022 06:01)  HR: 83 (24 Aug 2022 08:40) (77 - 97)  BP: 142/90 (24 Aug 2022 08:40) (124/73 - 142/90)  BP(mean): --  RR: 17 (24 Aug 2022 08:40) (16 - 18)  SpO2: 99% (24 Aug 2022 08:40) (97% - 99%)    Parameters below as of 24 Aug 2022 08:40  Patient On (Oxygen Delivery Method): room air    
Vital Signs Last 24 Hrs  T(C): 36.7 (19 Aug 2022 06:08), Max: 36.7 (19 Aug 2022 06:08)  T(F): 98.1 (19 Aug 2022 06:08), Max: 98.1 (19 Aug 2022 06:08)  HR: 89 (19 Aug 2022 06:08) (89 - 114)  BP: 131/64 (19 Aug 2022 06:08) (105/61 - 131/64)  BP(mean): --  RR: 17 (19 Aug 2022 06:08) (17 - 17)  SpO2: 98% (19 Aug 2022 06:08) (98% - 98%)    Parameters below as of 19 Aug 2022 06:08  Patient On (Oxygen Delivery Method): room air    
Vital Signs Last 24 Hrs  T(C): 36.6 (22 Aug 2022 07:03), Max: 36.6 (22 Aug 2022 07:03)  T(F): 97.9 (22 Aug 2022 07:03), Max: 97.9 (22 Aug 2022 07:03)  HR: 68 (22 Aug 2022 07:03) (68 - 83)  BP: 150/69 (22 Aug 2022 07:03) (128/74 - 150/69)  BP(mean): --  RR: 18 (22 Aug 2022 07:03) (18 - 19)  SpO2: 98% (22 Aug 2022 07:03) (93% - 98%)    Parameters below as of 22 Aug 2022 07:03  Patient On (Oxygen Delivery Method): room air    
Vital Signs Last 24 Hrs  T(C): 36.4 (23 Aug 2022 09:50), Max: 36.6 (22 Aug 2022 14:18)  T(F): 97.5 (23 Aug 2022 09:50), Max: 97.9 (22 Aug 2022 15:16)  HR: 65 (23 Aug 2022 09:50) (65 - 76)  BP: 129/61 (23 Aug 2022 09:50) (121/56 - 150/71)  BP(mean): --  RR: 17 (23 Aug 2022 09:50) (17 - 19)  SpO2: 97% (23 Aug 2022 09:50) (96% - 100%)    Parameters below as of 23 Aug 2022 09:50  Patient On (Oxygen Delivery Method): room air    
Vital Signs Last 24 Hrs  T(C): 36.6 (25 Aug 2022 09:22), Max: 36.6 (25 Aug 2022 09:22)  T(F): 97.8 (25 Aug 2022 09:22), Max: 97.8 (25 Aug 2022 09:22)  HR: 60 (25 Aug 2022 09:22) (60 - 71)  BP: 157/71 (25 Aug 2022 09:22) (102/55 - 162/66)  BP(mean): --  RR: 17 (25 Aug 2022 09:22) (16 - 18)  SpO2: 97% (25 Aug 2022 09:22) (96% - 98%)    Parameters below as of 25 Aug 2022 09:22  Patient On (Oxygen Delivery Method): room air    
Vital Signs Last 24 Hrs  T(C): 36.4 (17 Aug 2022 12:46), Max: 36.4 (17 Aug 2022 12:46)  T(F): 97.5 (17 Aug 2022 12:46), Max: 97.5 (17 Aug 2022 12:46)  HR: 108 (17 Aug 2022 12:46) (108 - 108)  BP: 109/69 (17 Aug 2022 12:46) (109/69 - 109/69)  BP(mean): --  RR: 17 (17 Aug 2022 12:46) (17 - 17)  SpO2: 96% (17 Aug 2022 12:46) (96% - 96%)    Parameters below as of 17 Aug 2022 12:46  Patient On (Oxygen Delivery Method): room air    
Vital Signs Last 24 Hrs  T(C): 36.4 (17 Aug 2022 12:46), Max: 37.1 (16 Aug 2022 22:55)  T(F): 97.5 (17 Aug 2022 12:46), Max: 98.8 (16 Aug 2022 22:55)  HR: 108 (17 Aug 2022 12:46) (50 - 108)  BP: 109/69 (17 Aug 2022 12:46) (109/69 - 149/69)  BP(mean): --  RR: 17 (17 Aug 2022 12:46) (16 - 19)  SpO2: 96% (17 Aug 2022 12:46) (96% - 99%)    Parameters below as of 17 Aug 2022 12:46  Patient On (Oxygen Delivery Method): room air    
Vital Signs Last 24 Hrs  T(C): 36.5 (21 Aug 2022 10:12), Max: 36.7 (20 Aug 2022 20:48)  T(F): 97.7 (21 Aug 2022 10:12), Max: 98 (20 Aug 2022 20:48)  HR: 83 (21 Aug 2022 10:12) (77 - 99)  BP: 128/74 (21 Aug 2022 10:12) (122/69 - 140/77)  BP(mean): --  RR: 19 (21 Aug 2022 10:12) (18 - 19)  SpO2: 98% (21 Aug 2022 10:12) (94% - 99%)    Parameters below as of 21 Aug 2022 10:12  Patient On (Oxygen Delivery Method): room air    
Vital Signs Last 24 Hrs  T(C): 36.4 (20 Aug 2022 09:00), Max: 36.7 (19 Aug 2022 21:18)  T(F): 97.6 (20 Aug 2022 09:00), Max: 98.1 (19 Aug 2022 21:18)  HR: 81 (20 Aug 2022 09:00) (81 - 88)  BP: 158/78 (20 Aug 2022 09:00) (100/69 - 158/78)  BP(mean): --  RR: 17 (20 Aug 2022 09:00) (17 - 18)  SpO2: 97% (20 Aug 2022 09:00) (96% - 97%)    Parameters below as of 20 Aug 2022 09:00  Patient On (Oxygen Delivery Method): room air

## 2022-08-25 NOTE — PROGRESS NOTE ADULT - PROBLEM SELECTOR PLAN 6
Patient with history of GERD, currently not on any medication.    Plan:  - PO protonix, spit out AM protonix this AM Patient with history of GERD, currently not on any medication.    Plan:  - PO protonix

## 2022-08-25 NOTE — PROGRESS NOTE ADULT - PROBLEM SELECTOR PLAN 1
Patient with Alzheimer's and possible frontotemporal dementia (no previous MRIs) presenting with worsening agitation, likely in setting of urinary retention, medication noncompliance, sciatica.  Pt came in for acute agitation from [facility].  Resting comfortably, now tolerating PO medications with coffee or ice cream    Plan  - c/w depakote sprinkles 250 BID   - c/w seroquel 25mg AM 50mg PM with 12.5 PRN QHS  - c/w melatonin 3mg  - holding home Lexapro 5mg qd, olanzapine 5mg qd  - PRN agitation: haldol 0.5 TID,seroquel 12.5mg po Q6H  - Obtain EKG to monitor QTc Patient with Alzheimer's and possible frontotemporal dementia (no previous MRIs) presenting with worsening agitation, likely in setting of urinary retention, medication noncompliance, sciatica.  Pt came in for acute agitation from [facility].  Resting comfortably, now tolerating PO medications with coffee or ice cream    Plan  - c/w depakote sprinkles 250 BID   - c/w seroquel 25mg AM 50mg PM with 12.5 PRN QHS  - c/w melatonin 3mg  - holding home Lexapro 5mg qd, olanzapine 5mg qd  - PRN agitation: haldol 0.5 TID,seroquel 12.5mg po Q6H  - Obtain EKG to monitor QTc Patient with Alzheimer's and possible frontotemporal dementia (no previous MRIs) presenting with worsening agitation, likely in setting of urinary retention, medication noncompliance, sciatica.  Pt came in for acute agitation from [facility].  Resting comfortably, now tolerating PO medications with coffee or ice cream    Plan  - c/w depakote sprinkles 250 BID   - c/w seroquel 25mg AM 50mg PM with 12.5 PRN QHS  - c/w melatonin 3mg  - holding home Lexapro 5mg qd, olanzapine 5mg qd  - PRN agitation: haldol 0.5 TID,seroquel 12.5mg po Q6H  - Obtain EKG to monitor QTc Patient with Alzheimer's and possible frontotemporal dementia (no previous MRIs) presenting with worsening agitation, likely in setting of urinary retention, medication noncompliance, sciatica.  Pt came in for acute agitation from [facility].  Resting comfortably, now tolerating PO medications with coffee or ice cream    Plan  - c/w depakote sprinkles 250 BID, patient tolerating well  - c/w seroquel 25mg AM, 50mg PM, with 25 PRN QH6, did not require PO agitation medication yesterday  - c/w melatonin 3mg  - holding home Lexapro 5mg qd, olanzapine 5mg qd  - PRN agitation: haldol 0.5 IM/IV if needed, seroquel 25 PO q6 Patient with Alzheimer's and possible frontotemporal dementia (no previous MRIs) presenting with worsening agitation, likely in setting of urinary retention, medication noncompliance, sciatica.  Pt came in for acute agitation from [facility].  Resting comfortably, now tolerating PO medications with coffee or ice cream    Plan  - c/w depakote sprinkles 250 BID, patient tolerating well  - c/w seroquel 25mg AM, 50mg PM, with 25 PRN QH6, did not require PO agitation medication yesterday  - c/w melatonin 3mg  - holding home Lexapro 5mg qd, olanzapine 5mg qd  - PRN agitation: haldol 0.5 IM/IV if needed, seroquel 25 PO q6

## 2022-08-25 NOTE — BH CONSULTATION LIAISON PROGRESS NOTE - NSBHFUPINTERVALHXFT_PSY_A_CORE
Patient was seen at bedside. She presented in bed, calm, cooperative, with baseline disorientation.  Chart review shows that pt hasn't had any episodes of agitation during the last 48hrs.

## 2022-08-25 NOTE — BH CONSULTATION LIAISON PROGRESS NOTE - NSBHADMITCOUNSEL_PSY_A_CORE
diagnostic results/impressions and/or recommended studies/risks and benefits of treatment options/risk factor reduction/client/family/caregiver education
instructions for management, treatment and follow up/client/family/caregiver education/other...

## 2022-08-25 NOTE — BH CONSULTATION LIAISON PROGRESS NOTE - NSBHPTASSESSDT_PSY_A_CORE
17-Aug-2022 14:22
24-Aug-2022 12:58
16-Aug-2022 10:58
25-Aug-2022 12:49
23-Aug-2022 11:01
20-Aug-2022 11:36
18-Aug-2022 12:02
19-Aug-2022 12:21
22-Aug-2022 09:51
21-Aug-2022 12:54

## 2022-08-25 NOTE — BH CONSULTATION LIAISON PROGRESS NOTE - NSBHMSESPABN_PSY_A_CORE
Impaired articulation
Soft volume/Slowed rate
Soft volume/Slowed rate
Impaired articulation
Impaired articulation
Other

## 2022-08-25 NOTE — PROGRESS NOTE ADULT - PROBLEM SELECTOR PLAN 7
Patient with history of sciatica, per daughter she frequently has back pain but not currently on any medications.  It's possibly back pain was contributing to behavioral disturbance but she was unable to express.    Plan:  - If patient remains agitated after resolution of urinary retention, will try Toradol IV Patient with history of sciatica, per daughter she frequently has back pain but not currently on any medications.  It's possibly back pain was contributing to behavioral disturbance but she was unable to express.    Plan:  -Patient agitation improving, reports no pain currently

## 2022-08-25 NOTE — BH CONSULTATION LIAISON PROGRESS NOTE - NSBHPSYCHOLCOGABN_PSY_A_CORE
disoriented to time/disoriented to place/disoriented to situation
disoriented to time/disoriented to place/disoriented to person/disoriented to situation
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
## 2022-08-25 NOTE — PROGRESS NOTE ADULT - PROBLEM SELECTOR PLAN 8
F: none  E: replete PRN  N: minced and moist, ensure 2/D  GI: PO protonix 40  DVT: Lovenox 40  Code: Full  Dispo: [facility] F: none  E: replete PRN  N: minced and moist, ensure 3 TID  GI: PO protonix 40  DVT: Lovenox 40  Code: Full  Dispo: [email or web address]

## 2022-08-25 NOTE — BH CONSULTATION LIAISON PROGRESS NOTE - CURRENT MEDICATION
MEDICATIONS  (STANDING):  amLODIPine   Tablet 5 milliGRAM(s) Oral every 24 hours  dextrose 5% + sodium chloride 0.45%. 1000 milliLiter(s) (75 mL/Hr) IV Continuous <Continuous>  enoxaparin Injectable 40 milliGRAM(s) SubCutaneous every 24 hours  escitalopram 5 milliGRAM(s) Oral every 24 hours  haloperidol    Injectable 0.5 milliGRAM(s) IntraMuscular every 8 hours  haloperidol    Injectable 0.5 milliGRAM(s) IntraMuscular once  valproate sodium  IVPB 250 milliGRAM(s) IV Intermittent two times a day    MEDICATIONS  (PRN):  aluminum hydroxide/magnesium hydroxide/simethicone Suspension 30 milliLiter(s) Oral every 4 hours PRN Dyspepsia  melatonin 3 milliGRAM(s) Oral at bedtime PRN Insomnia  
MEDICATIONS  (STANDING):  amLODIPine   Tablet 5 milliGRAM(s) Oral every 24 hours  diVALproex Sprinkle 250 milliGRAM(s) Oral every 12 hours  enoxaparin Injectable 40 milliGRAM(s) SubCutaneous every 24 hours  escitalopram 5 milliGRAM(s) Oral every 24 hours  QUEtiapine 50 milliGRAM(s) Oral two times a day    MEDICATIONS  (PRN):  aluminum hydroxide/magnesium hydroxide/simethicone Suspension 30 milliLiter(s) Oral every 4 hours PRN Dyspepsia  melatonin 3 milliGRAM(s) Oral at bedtime PRN Insomnia  
MEDICATIONS  (STANDING):  amLODIPine   Tablet 5 milliGRAM(s) Oral every 24 hours  diVALproex Sprinkle 250 milliGRAM(s) Oral every 12 hours  enoxaparin Injectable 40 milliGRAM(s) SubCutaneous every 24 hours  escitalopram 5 milliGRAM(s) Oral every 24 hours  pantoprazole    Tablet 40 milliGRAM(s) Oral before breakfast  polyethylene glycol 3350 17 Gram(s) Oral daily  QUEtiapine 25 milliGRAM(s) Oral two times a day  senna 2 Tablet(s) Oral at bedtime    MEDICATIONS  (PRN):  aluminum hydroxide/magnesium hydroxide/simethicone Suspension 30 milliLiter(s) Oral every 4 hours PRN Dyspepsia  melatonin 3 milliGRAM(s) Oral at bedtime PRN Insomnia  QUEtiapine 12.5 milliGRAM(s) Oral every 6 hours PRN agitation  
MEDICATIONS  (STANDING):  amLODIPine   Tablet 5 milliGRAM(s) Oral every 24 hours  diVALproex Sprinkle 250 milliGRAM(s) Oral every 12 hours  enoxaparin Injectable 40 milliGRAM(s) SubCutaneous every 24 hours  escitalopram 5 milliGRAM(s) Oral every 24 hours  pantoprazole    Tablet 40 milliGRAM(s) Oral before breakfast  QUEtiapine 50 milliGRAM(s) Oral two times a day    MEDICATIONS  (PRN):  aluminum hydroxide/magnesium hydroxide/simethicone Suspension 30 milliLiter(s) Oral every 4 hours PRN Dyspepsia  haloperidol    Injectable 0.5 milliGRAM(s) IntraMuscular every 8 hours PRN for agitation  melatonin 3 milliGRAM(s) Oral at bedtime PRN Insomnia  
MEDICATIONS  (STANDING):  amLODIPine   Tablet 5 milliGRAM(s) Oral every 24 hours  diVALproex Sprinkle 250 milliGRAM(s) Oral every 12 hours  enoxaparin Injectable 40 milliGRAM(s) SubCutaneous every 24 hours  escitalopram 5 milliGRAM(s) Oral every 24 hours  pantoprazole    Tablet 40 milliGRAM(s) Oral before breakfast  QUEtiapine 25 milliGRAM(s) Oral two times a day    MEDICATIONS  (PRN):  aluminum hydroxide/magnesium hydroxide/simethicone Suspension 30 milliLiter(s) Oral every 4 hours PRN Dyspepsia  haloperidol    Injectable 0.5 milliGRAM(s) IntraMuscular every 8 hours PRN for agitation  melatonin 3 milliGRAM(s) Oral at bedtime PRN Insomnia  QUEtiapine 12.5 milliGRAM(s) Oral every 6 hours PRN agitation  
MEDICATIONS  (STANDING):  amLODIPine   Tablet 5 milliGRAM(s) Oral every 24 hours  diVALproex Sprinkle 250 milliGRAM(s) Oral every 12 hours  enoxaparin Injectable 40 milliGRAM(s) SubCutaneous every 24 hours  escitalopram 5 milliGRAM(s) Oral every 24 hours  pantoprazole    Tablet 40 milliGRAM(s) Oral before breakfast  polyethylene glycol 3350 17 Gram(s) Oral daily  QUEtiapine 50 milliGRAM(s) Oral every 12 hours  senna 2 Tablet(s) Oral at bedtime    MEDICATIONS  (PRN):  aluminum hydroxide/magnesium hydroxide/simethicone Suspension 30 milliLiter(s) Oral every 4 hours PRN Dyspepsia  melatonin 3 milliGRAM(s) Oral at bedtime PRN Insomnia  QUEtiapine 25 milliGRAM(s) Oral every 6 hours PRN breakthrough agitation  
MEDICATIONS  (STANDING):  amLODIPine   Tablet 5 milliGRAM(s) Oral every 24 hours  dextrose 5% + sodium chloride 0.45%. 1000 milliLiter(s) (75 mL/Hr) IV Continuous <Continuous>  enoxaparin Injectable 40 milliGRAM(s) SubCutaneous every 24 hours  escitalopram 5 milliGRAM(s) Oral every 24 hours  haloperidol    Injectable 1 milliGRAM(s) IntraMuscular two times a day  valproate sodium  IVPB 250 milliGRAM(s) IV Intermittent two times a day    MEDICATIONS  (PRN):  aluminum hydroxide/magnesium hydroxide/simethicone Suspension 30 milliLiter(s) Oral every 4 hours PRN Dyspepsia  melatonin 3 milliGRAM(s) Oral at bedtime PRN Insomnia  
MEDICATIONS  (STANDING):  amLODIPine   Tablet 5 milliGRAM(s) Oral every 24 hours  diVALproex Sprinkle 250 milliGRAM(s) Oral every 12 hours  enoxaparin Injectable 40 milliGRAM(s) SubCutaneous every 24 hours  escitalopram 5 milliGRAM(s) Oral every 24 hours  pantoprazole    Tablet 40 milliGRAM(s) Oral before breakfast  polyethylene glycol 3350 17 Gram(s) Oral daily  QUEtiapine 50 milliGRAM(s) Oral every 12 hours  senna 2 Tablet(s) Oral at bedtime    MEDICATIONS  (PRN):  aluminum hydroxide/magnesium hydroxide/simethicone Suspension 30 milliLiter(s) Oral every 4 hours PRN Dyspepsia  melatonin 3 milliGRAM(s) Oral at bedtime PRN Insomnia  QUEtiapine 12.5 milliGRAM(s) Oral every 6 hours PRN agitation  
MEDICATIONS  (STANDING):  amLODIPine   Tablet 5 milliGRAM(s) Oral every 24 hours  diVALproex Sprinkle 250 milliGRAM(s) Oral every 12 hours  enoxaparin Injectable 40 milliGRAM(s) SubCutaneous every 24 hours  escitalopram 5 milliGRAM(s) Oral every 24 hours  pantoprazole    Tablet 40 milliGRAM(s) Oral before breakfast  polyethylene glycol 3350 17 Gram(s) Oral daily  QUEtiapine 50 milliGRAM(s) Oral every 24 hours  QUEtiapine 25 milliGRAM(s) Oral every 24 hours  senna 2 Tablet(s) Oral at bedtime    MEDICATIONS  (PRN):  aluminum hydroxide/magnesium hydroxide/simethicone Suspension 30 milliLiter(s) Oral every 4 hours PRN Dyspepsia  melatonin 3 milliGRAM(s) Oral at bedtime PRN Insomnia  QUEtiapine 12.5 milliGRAM(s) Oral every 6 hours PRN agitation  
MEDICATIONS  (STANDING):  amLODIPine   Tablet 5 milliGRAM(s) Oral every 24 hours  diVALproex Sprinkle 250 milliGRAM(s) Oral every 12 hours  enoxaparin Injectable 40 milliGRAM(s) SubCutaneous every 24 hours  escitalopram 5 milliGRAM(s) Oral every 24 hours  pantoprazole    Tablet 40 milliGRAM(s) Oral before breakfast  QUEtiapine 25 milliGRAM(s) Oral two times a day    MEDICATIONS  (PRN):  aluminum hydroxide/magnesium hydroxide/simethicone Suspension 30 milliLiter(s) Oral every 4 hours PRN Dyspepsia  melatonin 3 milliGRAM(s) Oral at bedtime PRN Insomnia  QUEtiapine 12.5 milliGRAM(s) Oral every 6 hours PRN agitation

## 2022-08-25 NOTE — BH CONSULTATION LIAISON PROGRESS NOTE - NSBHADMITCOORDWITH_PSY_A_CORE
medical staff/family/Caregiver
medical staff/family/Caregiver
medical staff
medical staff
medical staff/family/Caregiver

## 2022-08-25 NOTE — BH CONSULTATION LIAISON PROGRESS NOTE - NSBHMSEKNOW_PSY_A_CORE
Unable to assess
Impaired
Unable to assess
Unable to assess
Impaired
Unable to assess

## 2022-08-25 NOTE — BH CONSULTATION LIAISON PROGRESS NOTE - NSBHMSEBODY_PSY_A_CORE
Average build
Underweight
Average build
Underweight
Average build

## 2022-08-25 NOTE — PROGRESS NOTE ADULT - ATTENDING COMMENTS
88 y/o female PMH HLD, GERD, Alzheimer's and possible frontotemporal dementia, sciatica sent from assisted living for agitation    #Alzheimer's  #Frontotemporal dementia  Psychiatry following, appreciate recommendations for agitation in the setting of alzheimer's  - c/w seroquel 50mg PO BID, and 25mg PO q6 PRN, patient calm and stable on this regimen  - lexapro 5mg daily  - c/w minced and moist diet  - depakote converted to PO sprinkles, tolerating well  - PT eval recommends [facility]  - frequent reorientation  - melatonin QHS    #Severe protein calorie malnutrition  - dietician eval, add ensure    Remainder of plan as above    Dispo: [facility].  Patient has [facility] acceptance. Dc notice provided to daughter today, patient medically stable for dc 86 y/o female PMH HLD, GERD, Alzheimer's and possible frontotemporal dementia, sciatica sent from assisted living for agitation    #Alzheimer's  #Frontotemporal dementia  Psychiatry following, appreciate recommendations for agitation in the setting of alzheimer's  - c/w seroquel 50mg PO BID, and 25mg PO q6 PRN, patient calm and stable on this regimen  - lexapro 5mg daily  - c/w minced and moist diet  - depakote converted to PO sprinkles, tolerating well  - PT eval recommends [facility]  - frequent reorientation  - melatonin QHS    #Severe protein calorie malnutrition  - dietician eval, add ensure    Remainder of plan as above    Dispo: [facility].  Patient has [facility] acceptance. Dc notice provided to daughter today, patient medically stable for dc

## 2022-08-25 NOTE — BH CONSULTATION LIAISON PROGRESS NOTE - NSICDXBHPRIMARYDX_PSY_ALL_CORE
Dementia   F03.90  

## 2022-08-25 NOTE — BH CONSULTATION LIAISON PROGRESS NOTE - NSBHFUPREASONCONS_PSY_A_CORE
dementia
dementia/agitation
dementia
dementia/agitation
dementia
dementia/agitation
dementia/agitation

## 2022-08-25 NOTE — PROGRESS NOTE ADULT - PROBLEM SELECTOR PLAN 2
Pt started on minced and moist diet, tolerating well. s/p D5, 1/2 NS standing when NPO  Severe protein calorie malnutrition    Plan  - Minced and moist diet, encourage PO intake  - continue to supplement Ensure 2cans/day Pt started on minced and moist diet, tolerating well. s/p D5, 1/2 NS standing when NPO  Severe protein calorie malnutrition    Plan  - Minced and moist diet, encourage PO intake  - continue to supplement Ensure 2can a day TID.

## 2022-08-25 NOTE — PROGRESS NOTE ADULT - PROVIDER SPECIALTY LIST ADULT
Internal Medicine
Palliative Care
Internal Medicine

## 2022-08-25 NOTE — BH CONSULTATION LIAISON PROGRESS NOTE - NSBHATTESTBILLINGAW_PSY_A_CORE
[id number] Inpatient care - low complexity - 15 minutes [id number] Inpatient care - low complexity - 15 minutes [id number] Inpatient care - low complexity - 15 minutes

## 2022-08-25 NOTE — PROGRESS NOTE ADULT - SUBJECTIVE AND OBJECTIVE BOX
OVERNIGHT EVENTS: Patient 131 cc on BS. Encouraged to urinate.    SUBJECTIVE:  Patient seen and examined at bedside. Patient AAOx1. Patient remains baseline confused. Denies any current complaints. Denies c/p, palpitations, SOB, wheezing, abdominal pain, nausea, vomiting, dysuria, hematuria, polyuria.    Vital Signs Last 12 Hrs  T(F): 97.7 (08-24-22 @ 08:40), Max: 97.7 (08-24-22 @ 06:01)  HR: 83 (08-24-22 @ 08:40) (78 - 83)  BP: 142/90 (08-24-22 @ 08:40) (133/76 - 142/90)  BP(mean): --  RR: 17 (08-24-22 @ 08:40) (17 - 18)  SpO2: 99% (08-24-22 @ 08:40) (98% - 99%)  I&O's Summary    PHYSICAL EXAM:  General: AAOx1-2, NAD  Head: NC/AT; MMM; PERRL; EOMI;  Neck: Supple; no JVD  Respiratory: Diminished breath sounds B/L, No r/r/w  Cardiovascular: Regular rhythm/rate; S1/S2+, no murmurs, rubs gallops   Gastrointestinal: Soft; NTND; +4/4 BS  Extremities: WWP; no edema/cyanosis  Neurological: CNII-XII grossly intact; no obvious focal deficits  Skin: Clean and intact. Good skin turgor. Without open wounds and sores      LABS:                         RADIOLOGY, EKG & ADDITIONAL TESTS:                   RADIOLOGY & ADDITIONAL TESTS:    MEDICATIONS  (STANDING):  amLODIPine   Tablet 5 milliGRAM(s) Oral every 24 hours  diVALproex Sprinkle 250 milliGRAM(s) Oral every 12 hours  enoxaparin Injectable 40 milliGRAM(s) SubCutaneous every 24 hours  escitalopram 5 milliGRAM(s) Oral every 24 hours  pantoprazole    Tablet 40 milliGRAM(s) Oral before breakfast  polyethylene glycol 3350 17 Gram(s) Oral daily  QUEtiapine 50 milliGRAM(s) Oral every 12 hours  senna 2 Tablet(s) Oral at bedtime    MEDICATIONS  (PRN):  aluminum hydroxide/magnesium hydroxide/simethicone Suspension 30 milliLiter(s) Oral every 4 hours PRN Dyspepsia  melatonin 3 milliGRAM(s) Oral at bedtime PRN Insomnia  QUEtiapine 12.5 milliGRAM(s) Oral every 6 hours PRN agitation                                RADIOLOGY & ADDITIONAL TESTS:  Imaging from Last 24 Hours: OVERNIGHT EVENTS: Patient 131 cc on BS. Encouraged to urinate. No agitation overnight, did not require PO meds.    SUBJECTIVE:  Patient seen and examined at bedside. Patient AAOx1. Patient remains baseline confused. Denies any current complaints. Denies c/p, palpitations, SOB, wheezing, abdominal pain, nausea, vomiting, dysuria, hematuria, polyuria.      Vital Signs Last 24 Hrs  T(C): 36.6 (25 Aug 2022 09:22), Max: 36.6 (25 Aug 2022 09:22)  T(F): 97.8 (25 Aug 2022 09:22), Max: 97.8 (25 Aug 2022 09:22)  HR: 60 (25 Aug 2022 09:22) (60 - 71)  BP: 157/71 (25 Aug 2022 09:22) (102/55 - 162/66)  BP(mean): --  ABP: --  ABP(mean): --  RR: 17 (25 Aug 2022 09:22) (16 - 18)  SpO2: 97% (25 Aug 2022 09:22) (96% - 98%)    O2 Parameters below as of 25 Aug 2022 09:22  Patient On (Oxygen Delivery Method): room air      PHYSICAL EXAM:  General: AAOx1-2, NAD  Head: NC/AT; MMM; PERRL; EOMI;  Neck: Supple; no JVD  Respiratory: Diminished breath sounds B/L, No r/r/w  Cardiovascular: Regular rhythm/rate; S1/S2+, no murmurs, rubs gallops   Gastrointestinal: Soft; NTND; +4/4 BS  Extremities: WWP; no edema/cyanosis  Neurological: CNII-XII grossly intact; no obvious focal deficits  Skin: Clean and intact. Good skin turgor. Without open wounds and sores      LABS:                         RADIOLOGY, EKG & ADDITIONAL TESTS:                   RADIOLOGY & ADDITIONAL TESTS:    MEDICATIONS  (STANDING):  amLODIPine   Tablet 5 milliGRAM(s) Oral every 24 hours  diVALproex Sprinkle 250 milliGRAM(s) Oral every 12 hours  enoxaparin Injectable 40 milliGRAM(s) SubCutaneous every 24 hours  escitalopram 5 milliGRAM(s) Oral every 24 hours  pantoprazole    Tablet 40 milliGRAM(s) Oral before breakfast  polyethylene glycol 3350 17 Gram(s) Oral daily  QUEtiapine 50 milliGRAM(s) Oral every 12 hours  senna 2 Tablet(s) Oral at bedtime    MEDICATIONS  (PRN):  aluminum hydroxide/magnesium hydroxide/simethicone Suspension 30 milliLiter(s) Oral every 4 hours PRN Dyspepsia  melatonin 3 milliGRAM(s) Oral at bedtime PRN Insomnia  QUEtiapine 12.5 milliGRAM(s) Oral every 6 hours PRN agitation                                RADIOLOGY & ADDITIONAL TESTS:  Imaging from Last 24 Hours:

## 2022-08-25 NOTE — BH CONSULTATION LIAISON PROGRESS NOTE - NSBHMSEINTELL_PSY_A_CORE
Unable to assess
Unable to assess
Average
Unable to assess
Average
Unable to assess

## 2022-08-26 ENCOUNTER — TRANSCRIPTION ENCOUNTER (OUTPATIENT)
Age: 87
End: 2022-08-26

## 2022-08-26 VITALS
HEART RATE: 82 BPM | OXYGEN SATURATION: 97 % | SYSTOLIC BLOOD PRESSURE: 136 MMHG | RESPIRATION RATE: 18 BRPM | TEMPERATURE: 98 F | DIASTOLIC BLOOD PRESSURE: 68 MMHG

## 2022-08-26 PROCEDURE — 99238 HOSP IP/OBS DSCHRG MGMT 30/<: CPT

## 2022-08-26 RX ORDER — ESCITALOPRAM OXALATE 10 MG/1
1 TABLET, FILM COATED ORAL
Qty: 0 | Refills: 0 | DISCHARGE
Start: 2022-08-26

## 2022-08-26 RX ORDER — SENNA PLUS 8.6 MG/1
2 TABLET ORAL
Qty: 0 | Refills: 0 | DISCHARGE
Start: 2022-08-26

## 2022-08-26 RX ORDER — POLYETHYLENE GLYCOL 3350 17 G/17G
17 POWDER, FOR SOLUTION ORAL
Qty: 0 | Refills: 0 | DISCHARGE
Start: 2022-08-26

## 2022-08-26 RX ORDER — QUETIAPINE FUMARATE 200 MG/1
1 TABLET, FILM COATED ORAL
Qty: 0 | Refills: 0 | DISCHARGE
Start: 2022-08-26

## 2022-08-26 RX ADMIN — PANTOPRAZOLE SODIUM 40 MILLIGRAM(S): 20 TABLET, DELAYED RELEASE ORAL at 07:56

## 2022-08-26 NOTE — DISCHARGE NOTE NURSING/CASE MANAGEMENT/SOCIAL WORK - NSDCPEFALRISK_GEN_ALL_CORE
For information on Fall & Injury Prevention, visit: [email or web address]-[email or web address] OR  [email or web address]/[email or web address]-[email or web address]-[email or web address]-injury OR  https://www.cdc.gov/steadi/patient.html For information on Fall & Injury Prevention, visit: [email or web address]://[email or web address].[email or web address]/[email or web address]-[email or web address]-[email or web address]-mobility OR  https://www.[facility]/news/fall-prevention-tips-[email or web address]-injury OR  https://www.cdc.gov/steadi/patient.html For information on Fall & Injury Prevention, visit: [email or web address]://www.[email or web address]/news/[email or web address]-[email or web address]-health-[email or web address]-mobility OR  https://www.[email or web address]/[email or web address]-tips-to-avoid-injury OR  https://www.cdc.gov/steadi/patient.html

## 2022-08-26 NOTE — DISCHARGE NOTE NURSING/CASE MANAGEMENT/SOCIAL WORK - PATIENT PORTAL LINK FT
You can access the FollowMyHealth Patient Portal offered by [facility] by registering at the following website: [email or web address]. By joining [employer]’s FollowMyHealth portal, you will also be able to view your health information using other applications (apps) compatible with our system. You can access the FollowMyHealth Patient Portal offered by [facility] by registering at the following website: [email or web address]. By joining [employer]’s FollowMyHealth portal, you will also be able to view your health information using other applications (apps) compatible with our system. You can access the FollowMyHealth Patient Portal offered by [facility] by registering at the following website: [email or web address]. By joining [employer]’s FollowMyHealth portal, you will also be able to view your health information using other applications (apps) compatible with our system.

## 2022-09-02 DIAGNOSIS — M54.30 SCIATICA, UNSPECIFIED SIDE: ICD-10-CM

## 2022-09-02 DIAGNOSIS — R45.1 RESTLESSNESS AND AGITATION: ICD-10-CM

## 2022-09-02 DIAGNOSIS — E43 UNSPECIFIED SEVERE PROTEIN-CALORIE MALNUTRITION: ICD-10-CM

## 2022-09-02 DIAGNOSIS — R45.6 VIOLENT BEHAVIOR: ICD-10-CM

## 2022-09-02 DIAGNOSIS — G31.09 OTHER FRONTOTEMPORAL NEUROCOGNITIVE DISORDER: ICD-10-CM

## 2022-09-02 DIAGNOSIS — K21.9 GASTRO-ESOPHAGEAL REFLUX DISEASE WITHOUT ESOPHAGITIS: ICD-10-CM

## 2022-09-02 DIAGNOSIS — Z91.14 PATIENT'S OTHER NONCOMPLIANCE WITH MEDICATION REGIMEN: ICD-10-CM

## 2022-09-02 DIAGNOSIS — G30.9 ALZHEIMER'S DISEASE, UNSPECIFIED: ICD-10-CM

## 2022-09-02 DIAGNOSIS — Z66 DO NOT RESUSCITATE: ICD-10-CM

## 2022-09-02 DIAGNOSIS — R33.9 RETENTION OF URINE, UNSPECIFIED: ICD-10-CM

## 2022-09-02 DIAGNOSIS — F02.81 DEMENTIA IN OTHER DISEASES CLASSIFIED ELSEWHERE, UNSPECIFIED SEVERITY, WITH BEHAVIORAL DISTURBANCE: ICD-10-CM

## 2022-09-02 DIAGNOSIS — E78.5 HYPERLIPIDEMIA, UNSPECIFIED: ICD-10-CM

## 2022-09-09 ENCOUNTER — APPOINTMENT (OUTPATIENT)
Dept: GERIATRICS | Facility: ASSISTED LIVING FACILITY | Age: 87
End: 2022-09-09

## 2022-09-09 ENCOUNTER — NON-APPOINTMENT (OUTPATIENT)
Age: 87
End: 2022-09-09

## 2022-09-15 PROCEDURE — 85027 COMPLETE CBC AUTOMATED: CPT

## 2022-09-15 PROCEDURE — 81003 URINALYSIS AUTO W/O SCOPE: CPT

## 2022-09-15 PROCEDURE — 87635 SARS-COV-2 COVID-19 AMP PRB: CPT

## 2022-09-15 PROCEDURE — 96372 THER/PROPH/DIAG INJ SC/IM: CPT | Mod: XU

## 2022-09-15 PROCEDURE — 99285 EMERGENCY DEPT VISIT HI MDM: CPT | Mod: 25

## 2022-09-15 PROCEDURE — 36415 COLL VENOUS BLD VENIPUNCTURE: CPT

## 2022-09-15 PROCEDURE — 84443 ASSAY THYROID STIM HORMONE: CPT

## 2022-09-15 PROCEDURE — 80053 COMPREHEN METABOLIC PANEL: CPT

## 2022-09-15 PROCEDURE — 81001 URINALYSIS AUTO W/SCOPE: CPT

## 2022-09-15 PROCEDURE — 80164 ASSAY DIPROPYLACETIC ACD TOT: CPT

## 2022-09-15 PROCEDURE — 82553 CREATINE MB FRACTION: CPT

## 2022-09-15 PROCEDURE — 97530 THERAPEUTIC ACTIVITIES: CPT

## 2022-09-15 PROCEDURE — 71045 X-RAY EXAM CHEST 1 VIEW: CPT

## 2022-09-15 PROCEDURE — 80076 HEPATIC FUNCTION PANEL: CPT

## 2022-09-15 PROCEDURE — 80048 BASIC METABOLIC PNL TOTAL CA: CPT

## 2022-09-15 PROCEDURE — 84100 ASSAY OF PHOSPHORUS: CPT

## 2022-09-15 PROCEDURE — 83735 ASSAY OF MAGNESIUM: CPT

## 2022-09-15 PROCEDURE — 97116 GAIT TRAINING THERAPY: CPT

## 2022-09-15 PROCEDURE — 97161 PT EVAL LOW COMPLEX 20 MIN: CPT

## 2022-09-15 PROCEDURE — 80061 LIPID PANEL: CPT

## 2022-09-15 PROCEDURE — 82550 ASSAY OF CK (CPK): CPT

## 2022-09-15 PROCEDURE — 97110 THERAPEUTIC EXERCISES: CPT

## 2022-09-15 PROCEDURE — 82962 GLUCOSE BLOOD TEST: CPT

## 2022-09-15 PROCEDURE — 93005 ELECTROCARDIOGRAM TRACING: CPT

## 2022-09-15 PROCEDURE — 85025 COMPLETE CBC W/AUTO DIFF WBC: CPT

## 2022-09-16 ENCOUNTER — TRANSCRIPTION ENCOUNTER (OUTPATIENT)
Age: 87
End: 2022-09-16

## 2022-09-22 ENCOUNTER — NON-APPOINTMENT (OUTPATIENT)
Age: 87
End: 2022-09-22

## 2022-09-28 NOTE — H&P ADULT - PROBLEM/PLAN-2
Name: Mariano Bernheim      : 1994      MRN: [id number]  Encounter Provider: Nurse Alison Cushing FP  Encounter Date: 2022   Encounter department: [place] [id number]  Encounter for PPD skin test reading           Subjective      HPI  Review of Systems    Current Outpatient Medications on File Prior to Visit   Medication Sig    escitalopram (LEXAPRO) 10 mg tablet TAKE 1 TABLET BY MOUTH EVERY DAY    Multiple Vitamins-Calcium (ONE-A-DAY WOMENS FORMULA PO) Take by mouth       Objective     There were no vitals taken for this visit      Physical Exam  Nurse Alison Cushing FP
DISPLAY PLAN FREE TEXT

## 2022-10-05 ENCOUNTER — APPOINTMENT (OUTPATIENT)
Dept: GERIATRICS | Facility: ASSISTED LIVING FACILITY | Age: 87
End: 2022-10-05

## 2022-10-24 ENCOUNTER — NON-APPOINTMENT (OUTPATIENT)
Age: 87
End: 2022-10-24

## 2022-10-24 NOTE — PROGRESS NOTE ADULT - PROBLEM/PLAN-7
DISPLAY PLAN FREE TEXT
86M, coming form home, declining in ambulatory status, with a PMHx of CAD, s/p stents, PAD on Plavix and Eliquis, DM, BPH, HTN BIBEMS for evaluation s/p fall and knee pain. Patient admitted s/p fall in the setting of weakness and ambulatory decline after recent hospitalization.       
DISPLAY PLAN FREE TEXT

## 2024-01-11 RX ORDER — DIVALPROEX SODIUM 500 MG/1
4 TABLET, DELAYED RELEASE ORAL
Qty: 0 | Refills: 0 | DISCHARGE

## 2024-01-11 RX ORDER — AMLODIPINE BESYLATE 2.5 MG/1
1 TABLET ORAL
Qty: 0 | Refills: 0 | DISCHARGE

## 2024-01-11 RX ORDER — OLANZAPINE 15 MG/1
1 TABLET, FILM COATED ORAL
Qty: 0 | Refills: 0 | DISCHARGE

## 2024-01-11 RX ORDER — DIVALPROEX SODIUM 500 MG/1
2 TABLET, DELAYED RELEASE ORAL
Qty: 0 | Refills: 0 | DISCHARGE

## 2024-01-11 RX ORDER — ESCITALOPRAM OXALATE 10 MG/1
1 TABLET, FILM COATED ORAL
Qty: 0 | Refills: 0 | DISCHARGE

## 2024-05-20 NOTE — BH CONSULTATION LIAISON PROGRESS NOTE - NSBHMSESPEECH_PSY_A_CORE
COVID screening has been completed and no concerns for COVID at this time. Social distancing and mask usage was followed.      Psychotropic medication compliance: Yes  Psychotropic medication side effects:  None  Psychotropic PRNs given: Norco, Trazodone, Atarax,   Appetite and food intake: Snack given   Sleep hours: 6.5      Received pt at 7pm, pt in milieu, visible and social with peers. Pt attended evening group with participation. Pt has flat depressed affect with depressed mood. Pt denies suicidal/homicidal ideation, pt denies all hallucinations. Pt received all scheduled medication. Rated chronic back pain 5/10 requesting prn norco, given at 10:30pm with PRN Atarax and Trazodone. Pt went to bed at bedtime.    Abnormal as indicated, otherwise normal...

## 2025-05-16 NOTE — CONSULT NOTE ADULT - PROBLEM SELECTOR RECOMMENDATION 4
LM for pt  Results have not yet been released, recommended she follow up with [facility] center   Following for GOC.    Lauren Rondon MD  Palliative Fellow, PGY5  Geriatrics and Palliative Consult Service Dementia with worsening agitation and aggressive episodes at her assisted living facility.  - Will need placement to another facility on discharge as current assisted living facility cannot manage her symptoms